# Patient Record
Sex: MALE | Race: WHITE | NOT HISPANIC OR LATINO | Employment: OTHER | URBAN - METROPOLITAN AREA
[De-identification: names, ages, dates, MRNs, and addresses within clinical notes are randomized per-mention and may not be internally consistent; named-entity substitution may affect disease eponyms.]

---

## 2018-01-18 NOTE — RESULT NOTES
Verified Results  (1) CBC/PLT/DIFF [id number] 07:38AM [place] Chaparronancy Coulter Order Number: [id number]     Order Number: [id number]     Test Name Result Flag Reference   WBC COUNT 5 82 Thousand/uL  4 31-10 16   RBC COUNT 4 25 Million/uL  3 88-5 62   HEMOGLOBIN 14 0 g/dL  12 0-17 0   HEMATOCRIT 42 3 %  36 5-49 3    fL H 82-98   MCH 32 9 pg  26 8-34 3   MCHC 33 1 g/dL  31 4-37 4   RDW 12 6 %  11 6-15 1   MPV 10 3 fL  8 9-12 7   PLATELET COUNT 202 Thousands/uL  149-390   NEUTROPHILS RELATIVE PERCENT 68 %  43-75   LYMPHOCYTES RELATIVE PERCENT 19 %  14-44   MONOCYTES RELATIVE PERCENT 11 %  4-12   EOSINOPHILS RELATIVE PERCENT 2 %  0-6   BASOPHILS RELATIVE PERCENT 0 %  0-1   NEUTROPHILS ABSOLUTE COUNT 4 00 Thousands/µL  1 85-7 62   LYMPHOCYTES ABSOLUTE COUNT 1 08 Thousands/µL  0 60-4 47   MONOCYTES ABSOLUTE COUNT 0 62 Thousand/µL  0 17-1 22   EOSINOPHILS ABSOLUTE COUNT 0 10 Thousand/µL  0 00-0 61   BASOPHILS ABSOLUTE COUNT 0 02 Thousands/µL  0 00-0 10     (1) LIPID PANEL, FASTING [id number] 07:38AM [place] Order Number: [id number]      Triglyceride:         Normal              <150 mg/dl       Borderline High    150-199 mg/dl       High               200-499 mg/dl       Very High          >499 mg/dl  Cholesterol:         Desirable        <200 mg/dl      Borderline High  200-239 mg/dl      High             >239 mg/dl  HDL Cholesterol:        High    >59 mg/dL      Low     <41 mg/dL     Test Name Result Flag Reference   CHOLESTEROL 191 mg/dL     HDL,DIRECT 70 mg/dL H 40-60   Specimen collection should occur prior to Metamizole administration due to the potential for falsely depressed results  LDL CHOLESTEROL CALCULATED 105 mg/dL H 0-100   TRIGLYCERIDES 78 mg/dL  <=150   Specimen collection should occur prior to N-Acetylcysteine or Metamizole administration due to the potential for falsely depressed results       (1) COMPREHENSIVE METABOLIC PANEL [id number] 47:54NV Chet Delarosa    Order Number: [id number]      National Kidney Disease Education Program recommendations are as follows:  GFR calculation is accurate only with a steady state creatinine  Chronic Kidney disease less than 60 ml/min/1 73 sq  meters  Kidney failure less than 15 ml/min/1 73 sq  meters  Test Name Result Flag Reference   GLUCOSE,RANDM 113 mg/dL     If the patient is fasting, the ADA then defines impaired fasting glucose as > 100 mg/dL and diabetes as > or equal to 123 mg/dL  SODIUM 142 mmol/L  136-145   POTASSIUM 4 3 mmol/L  3 5-5 3   CHLORIDE 106 mmol/L  100-108   CARBON DIOXIDE 26 mmol/L  21-32   ANION GAP (CALC) 10 mmol/L  4-13   BLOOD UREA NITROGEN 15 mg/dL  5-25   CREATININE 1 10 mg/dL  0 60-1 30   Standardized to IDMS reference method   CALCIUM 8 6 mg/dL  8 3-10 1   BILI, TOTAL 0 64 mg/dL  0 20-1 00   ALK PHOSPHATAS 61 U/L     ALT (SGPT) 41 U/L  12-78   AST(SGOT) 21 U/L  5-45   ALBUMIN 3 5 g/dL  3 5-5 0   TOTAL PROTEIN 6 9 g/dL  6 4-8 2   eGFR Non-African American      >60 0 ml/min/1 73sq m     (1) TSH WITH FT4 REFLEX [id number] 07:38AM Maya Delarosa Order Number: [id number]    Patients undergoing fluorescein dye angiography may retain small amounts of fluorescein in the body for 48-72 hours post procedure  Samples containing fluorescein can produce falsely depressed TSH values  If the patient had this procedure,a specimen should be resubmitted post fluorescein clearance  Test Name Result Flag Reference   TSH 0 794 uIU/mL  0 358-3 740     (1) PSA (SCREEN) (Dx V76 44 Screen for Prostate Cancer) [id number] 07:38AM Tj Cabrera Order Number: [id number]     Order Number: [id number]     Test Name Result Flag Reference   PROSTATE SPECIFIC ANTIGEN 0 9 ng/mL  0 0-4 0       Plan  PMH: Screening for prostate cancer    · (1) PSA (SCREEN) (Dx V76 44 Screen for Prostate Cancer) ; every 1 year; Last  [id number]; Next [id number]; Status:Active    Discussion/Summary    please call  labs are good  Pt is aware1       1 Amended By: Yessenia Steele; Apr 01 2016 8:48 AM EST    Signatures   Electronically signed by :  Yessenia Steele, ; Apr 1 2016  8:48AM EST                       (Author)

## 2018-02-13 ENCOUNTER — OFFICE VISIT (OUTPATIENT)
Dept: FAMILY MEDICINE CLINIC | Facility: HOSPITAL | Age: 73
End: 2018-02-13
Payer: MEDICARE

## 2018-02-13 VITALS
SYSTOLIC BLOOD PRESSURE: 140 MMHG | TEMPERATURE: 97 F | BODY MASS INDEX: 33.09 KG/M2 | HEIGHT: 69 IN | DIASTOLIC BLOOD PRESSURE: 80 MMHG | WEIGHT: 223.4 LBS | HEART RATE: 76 BPM

## 2018-02-13 DIAGNOSIS — E78.5 HYPERLIPIDEMIA, UNSPECIFIED HYPERLIPIDEMIA TYPE: ICD-10-CM

## 2018-02-13 DIAGNOSIS — R01.1 HEART MURMUR: Primary | ICD-10-CM

## 2018-02-13 DIAGNOSIS — J06.9 UPPER RESPIRATORY INFECTION, VIRAL: ICD-10-CM

## 2018-02-13 PROCEDURE — 99214 OFFICE O/P EST MOD 30 MIN: CPT | Performed by: NURSE PRACTITIONER

## 2018-02-13 RX ORDER — AMOXICILLIN 500 MG
CAPSULE ORAL
COMMUNITY
Start: 2014-11-19 | End: 2020-05-27

## 2018-02-13 RX ORDER — SIMVASTATIN 10 MG
1 TABLET ORAL DAILY
COMMUNITY
Start: 2014-11-24 | End: 2018-06-21 | Stop reason: SDUPTHER

## 2018-02-13 NOTE — PROGRESS NOTES
Assessment/Plan:    Likely viral URI  I do not feel it is r/t previous cough as that had resolved and was not r/t nasal congestion  Advise starting decongestant with cough suppressant  Call with any worsening or no improvement in 10-14 days  New heart murmur  Will order echo and screening blood work  Plans to schedule PE with Dr Blondell Dakin  Diagnoses and all orders for this visit:    Heart murmur  -     Echo complete with contrast if indicated; Future    Hyperlipidemia, unspecified hyperlipidemia type  -     CBC and differential; Future  -     Comprehensive metabolic panel; Future  -     Lipid panel; Future  -     TSH, 3rd generation with T4 reflex; Future    Upper respiratory infection, viral    Other orders  -     Omega-3 Fatty Acids (FISH OIL) 1200 MG CAPS; Take by mouth  -     Multiple Vitamin (MULTI-VITAMIN DAILY PO); Take by mouth  -     simvastatin (ZOCOR) 10 mg tablet; Take 1 tablet by mouth daily          Subjective:      Patient ID: Ashley Ny is a 71 y o  male  Cough started over 1 month ago  No fever, fatigue  No nasal congestion  Dry cough  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  Cough did go away for about 2 weeks  Last week started with cough again  Not as severe  Feels high in chest  Has nasal congestion  Denies fever, chills  Wife wanted him to come in  Denies sore throat and ear pain  Had some wheezing with last cough but not this time  Has taken amoxicillin his wife had left over  Leaving for vacation  Denies h/o heart murmur  Denies any sob  Has not had any blood work done in years  The following portions of the patient's history were reviewed and updated as appropriate: allergies, current medications, past medical history, past social history and problem list     Review of Systems   Constitutional: Negative for chills, fatigue and fever  HENT: Positive for congestion and ear pain  Negative for sore throat  Respiratory: Positive for cough   Negative for shortness of breath and wheezing  Cardiovascular: Negative for chest pain  Objective:    Vitals:    02/13/18 1117   BP: 140/80   Pulse: 76   Temp: (!) 97 °F (36 1 °C)        Physical Exam   Constitutional: He is oriented to person, place, and time  He appears well-developed and well-nourished  HENT:   Right Ear: Tympanic membrane, external ear and ear canal normal    Left Ear: Tympanic membrane, external ear and ear canal normal    Mouth/Throat: Uvula is midline and oropharynx is clear and moist    Cardiovascular: Normal rate, regular rhythm, S1 normal and S2 normal     Murmur heard  Systolic murmur is present with a grade of 2/6   Pulmonary/Chest: Effort normal and breath sounds normal    Lymphadenopathy:     He has no cervical adenopathy  Neurological: He is alert and oriented to person, place, and time  Skin: Skin is warm and dry  Psychiatric: He has a normal mood and affect

## 2018-02-14 ENCOUNTER — APPOINTMENT (OUTPATIENT)
Dept: LAB | Facility: HOSPITAL | Age: 73
End: 2018-02-14
Payer: MEDICARE

## 2018-02-14 DIAGNOSIS — E78.5 HYPERLIPIDEMIA, UNSPECIFIED HYPERLIPIDEMIA TYPE: ICD-10-CM

## 2018-02-14 LAB
ALBUMIN SERPL BCP-MCNC: 3.6 G/DL (ref 3.5–5)
ALP SERPL-CCNC: 58 U/L (ref 46–116)
ALT SERPL W P-5'-P-CCNC: 41 U/L (ref 12–78)
ANION GAP SERPL CALCULATED.3IONS-SCNC: 9 MMOL/L (ref 4–13)
AST SERPL W P-5'-P-CCNC: 23 U/L (ref 5–45)
BASOPHILS # BLD AUTO: 0.03 THOUSANDS/ΜL (ref 0–0.1)
BASOPHILS NFR BLD AUTO: 0 % (ref 0–1)
BILIRUB SERPL-MCNC: 0.6 MG/DL (ref 0.2–1)
BUN SERPL-MCNC: 18 MG/DL (ref 5–25)
CALCIUM SERPL-MCNC: 8.8 MG/DL (ref 8.3–10.1)
CHLORIDE SERPL-SCNC: 104 MMOL/L (ref 100–108)
CHOLEST SERPL-MCNC: 181 MG/DL (ref 50–200)
CO2 SERPL-SCNC: 29 MMOL/L (ref 21–32)
CREAT SERPL-MCNC: 1.02 MG/DL (ref 0.6–1.3)
EOSINOPHIL # BLD AUTO: 0.15 THOUSAND/ΜL (ref 0–0.61)
EOSINOPHIL NFR BLD AUTO: 2 % (ref 0–6)
ERYTHROCYTE [DISTWIDTH] IN BLOOD BY AUTOMATED COUNT: 12.3 % (ref 11.6–15.1)
GFR SERPL CREATININE-BSD FRML MDRD: 74 ML/MIN/1.73SQ M
GLUCOSE P FAST SERPL-MCNC: 117 MG/DL (ref 65–99)
HCT VFR BLD AUTO: 42.4 % (ref 36.5–49.3)
HDLC SERPL-MCNC: 51 MG/DL (ref 40–60)
HGB BLD-MCNC: 13.8 G/DL (ref 12–17)
LDLC SERPL CALC-MCNC: 112 MG/DL (ref 0–100)
LYMPHOCYTES # BLD AUTO: 1.66 THOUSANDS/ΜL (ref 0.6–4.47)
LYMPHOCYTES NFR BLD AUTO: 24 % (ref 14–44)
MCH RBC QN AUTO: 32.5 PG (ref 26.8–34.3)
MCHC RBC AUTO-ENTMCNC: 32.5 G/DL (ref 31.4–37.4)
MCV RBC AUTO: 100 FL (ref 82–98)
MONOCYTES # BLD AUTO: 0.58 THOUSAND/ΜL (ref 0.17–1.22)
MONOCYTES NFR BLD AUTO: 8 % (ref 4–12)
NEUTROPHILS # BLD AUTO: 4.62 THOUSANDS/ΜL (ref 1.85–7.62)
NEUTS SEG NFR BLD AUTO: 66 % (ref 43–75)
PLATELET # BLD AUTO: 271 THOUSANDS/UL (ref 149–390)
PMV BLD AUTO: 10.2 FL (ref 8.9–12.7)
POTASSIUM SERPL-SCNC: 4.1 MMOL/L (ref 3.5–5.3)
PROT SERPL-MCNC: 7.6 G/DL (ref 6.4–8.2)
RBC # BLD AUTO: 4.24 MILLION/UL (ref 3.88–5.62)
SODIUM SERPL-SCNC: 142 MMOL/L (ref 136–145)
TRIGL SERPL-MCNC: 88 MG/DL
TSH SERPL DL<=0.05 MIU/L-ACNC: 1.16 UIU/ML (ref 0.36–3.74)
WBC # BLD AUTO: 7.04 THOUSAND/UL (ref 4.31–10.16)

## 2018-02-14 PROCEDURE — 85025 COMPLETE CBC W/AUTO DIFF WBC: CPT

## 2018-02-14 PROCEDURE — 36415 COLL VENOUS BLD VENIPUNCTURE: CPT

## 2018-02-14 PROCEDURE — 84443 ASSAY THYROID STIM HORMONE: CPT

## 2018-02-14 PROCEDURE — 80061 LIPID PANEL: CPT

## 2018-02-14 PROCEDURE — 80053 COMPREHEN METABOLIC PANEL: CPT

## 2018-03-05 ENCOUNTER — OFFICE VISIT (OUTPATIENT)
Dept: FAMILY MEDICINE CLINIC | Facility: HOSPITAL | Age: 73
End: 2018-03-05
Payer: MEDICARE

## 2018-03-05 ENCOUNTER — HOSPITAL ENCOUNTER (OUTPATIENT)
Dept: NON INVASIVE DIAGNOSTICS | Facility: CLINIC | Age: 73
Discharge: HOME | End: 2018-03-05
Payer: MEDICARE

## 2018-03-05 VITALS
RESPIRATION RATE: 12 BRPM | TEMPERATURE: 96.8 F | WEIGHT: 221.4 LBS | DIASTOLIC BLOOD PRESSURE: 82 MMHG | SYSTOLIC BLOOD PRESSURE: 154 MMHG | HEART RATE: 84 BPM | BODY MASS INDEX: 32.79 KG/M2 | HEIGHT: 69 IN

## 2018-03-05 DIAGNOSIS — R01.1 HEART MURMUR: ICD-10-CM

## 2018-03-05 DIAGNOSIS — R01.1 MURMUR, CARDIAC: ICD-10-CM

## 2018-03-05 DIAGNOSIS — Z23 NEED FOR VACCINATION WITH 13-POLYVALENT PNEUMOCOCCAL CONJUGATE VACCINE: Primary | ICD-10-CM

## 2018-03-05 DIAGNOSIS — E78.5 HYPERLIPIDEMIA, UNSPECIFIED HYPERLIPIDEMIA TYPE: ICD-10-CM

## 2018-03-05 DIAGNOSIS — R73.03 PREDIABETES: ICD-10-CM

## 2018-03-05 PROCEDURE — 99214 OFFICE O/P EST MOD 30 MIN: CPT | Performed by: FAMILY MEDICINE

## 2018-03-05 PROCEDURE — 90670 PCV13 VACCINE IM: CPT | Performed by: FAMILY MEDICINE

## 2018-03-05 PROCEDURE — G0009 ADMIN PNEUMOCOCCAL VACCINE: HCPCS | Performed by: FAMILY MEDICINE

## 2018-03-05 PROCEDURE — 93306 TTE W/DOPPLER COMPLETE: CPT

## 2018-03-05 NOTE — PROGRESS NOTES
Patient is here for follow up of chronic conditions  1  Murmur  Murmur was found on the last visit  Not associated with shortness of breath  No chest pain  No lightheadedness  2  Hyperlipidemia  Doing well with his current regimen  3  Impaired fasting glucose  Last fasting blood work showed fasting glucose of 117  Review of Systems   Constitutional: Negative  Negative for activity change, appetite change, chills and diaphoresis  HENT: Negative for congestion and dental problem  Respiratory: Negative  Negative for apnea, chest tightness, shortness of breath and wheezing  Cardiovascular: Negative  Negative for chest pain, palpitations and leg swelling  Gastrointestinal: Negative  Negative for abdominal distention, abdominal pain, constipation, diarrhea and nausea  Genitourinary: Negative  Negative for difficulty urinating, dysuria and frequency  Social History     Social History    Marital status: /Civil Union     Spouse name: N/A    Number of children: N/A    Years of education: N/A     Occupational History    Not on file  Social History Main Topics    Smoking status: Never Smoker    Smokeless tobacco: Never Used    Alcohol use Yes      Comment: social    Drug use: No    Sexual activity: Not on file     Other Topics Concern    Not on file     Social History Narrative    No narrative on file               No Known Allergies    Immunization History   Administered Date(s) Administered    Influenza Split High Dose Preservative Free IM 10/22/2014    Influenza TIV (IM) 09/23/2017    Pneumococcal Conjugate 13-Valent 03/05/2018    Pneumococcal Polysaccharide PPV23 11/06/2012    Tdap 11/06/2008       Vitals:    03/05/18 1044   BP: 154/82   Pulse: 84   Resp: 12   Temp: (!) 96 8 °F (36 °C)     Physical Exam   Constitutional: He is oriented to person, place, and time  He appears well-developed and well-nourished     HENT:   Head: Normocephalic and atraumatic  Eyes: EOM are normal  Pupils are equal, round, and reactive to light  Neck: Normal range of motion  Neck supple  Cardiovascular: Normal rate and regular rhythm  Murmur heard  Systolic murmur is present with a grade of 3/6   Pulmonary/Chest: Effort normal and breath sounds normal    Abdominal: Soft  Bowel sounds are normal    Neurological: He is alert and oriented to person, place, and time  Skin: Skin is warm and dry  Psychiatric: He has a normal mood and affect  His behavior is normal    Nursing note and vitals reviewed                Recent Results (from the past 4368 hour(s))   CBC and differential    Collection Time: 02/14/18  6:33 AM   Result Value Ref Range    WBC 7 04 4 31 - 10 16 Thousand/uL    RBC 4 24 3 88 - 5 62 Million/uL    Hemoglobin 13 8 12 0 - 17 0 g/dL    Hematocrit 42 4 36 5 - 49 3 %     (H) 82 - 98 fL    MCH 32 5 26 8 - 34 3 pg    MCHC 32 5 31 4 - 37 4 g/dL    RDW 12 3 11 6 - 15 1 %    MPV 10 2 8 9 - 12 7 fL    Platelets 332 755 - 303 Thousands/uL    Neutrophils Relative 66 43 - 75 %    Lymphocytes Relative 24 14 - 44 %    Monocytes Relative 8 4 - 12 %    Eosinophils Relative 2 0 - 6 %    Basophils Relative 0 0 - 1 %    Neutrophils Absolute 4 62 1 85 - 7 62 Thousands/µL    Lymphocytes Absolute 1 66 0 60 - 4 47 Thousands/µL    Monocytes Absolute 0 58 0 17 - 1 22 Thousand/µL    Eosinophils Absolute 0 15 0 00 - 0 61 Thousand/µL    Basophils Absolute 0 03 0 00 - 0 10 Thousands/µL   Comprehensive metabolic panel    Collection Time: 02/14/18  6:33 AM   Result Value Ref Range    Sodium 142 136 - 145 mmol/L    Potassium 4 1 3 5 - 5 3 mmol/L    Chloride 104 100 - 108 mmol/L    CO2 29 21 - 32 mmol/L    Anion Gap 9 4 - 13 mmol/L    BUN 18 5 - 25 mg/dL    Creatinine 1 02 0 60 - 1 30 mg/dL    Glucose, Fasting 117 (H) 65 - 99 mg/dL    Calcium 8 8 8 3 - 10 1 mg/dL    AST 23 5 - 45 U/L    ALT 41 12 - 78 U/L    Alkaline Phosphatase 58 46 - 116 U/L    Total Protein 7 6 6 4 - 8 2 g/dL    Albumin 3 6 3 5 - 5 0 g/dL    Total Bilirubin 0 60 0 20 - 1 00 mg/dL    eGFR 74 ml/min/1 73sq m   Lipid panel    Collection Time: 02/14/18  6:33 AM   Result Value Ref Range    Cholesterol 181 50 - 200 mg/dL    Triglycerides 88 <=150 mg/dL    HDL, Direct 51 40 - 60 mg/dL    LDL Calculated 112 (H) 0 - 100 mg/dL   TSH, 3rd generation with T4 reflex    Collection Time: 02/14/18  6:33 AM   Result Value Ref Range    TSH 3RD GENERATON 1 162 0 358 - 3 740 uIU/mL     Problem List Items Addressed This Visit     Hyperlipidemia    Prediabetes    Murmur, cardiac      Other Visit Diagnoses     Need for vaccination with 13-polyvalent pneumococcal conjugate vaccine    -  Primary    Relevant Orders    PNEUMOCOCCAL CONJUGATE VACCINE 13-VALENT GREATER THAN 6 MONTHS (Completed)       await results of 2D echo  Patient currently asymptomatic despite the murmur  Discussed dietary control  Discussed continue regular exercise  Discussed weight loss  Reviewed blood work  Prevnar 13 given today  Will need   Pneumovax 23 next year  To call our office if any concerns/questions at [phone]  Health Maintenance Due   Topic Date Due    Hepatitis C Screening  1945    COLONOSCOPY  1945    Depression Screening PHQ-9  12/18/1958    DTaP,Tdap,and Td Vaccines (2 - Td) 12/04/2008    GLAUCOMA SCREENING 67+ YR  12/18/2013    INFLUENZA VACCINE  09/01/2017       No Follow-up on file

## 2018-03-06 PROCEDURE — 93306 TTE W/DOPPLER COMPLETE: CPT | Performed by: INTERNAL MEDICINE

## 2018-03-09 ENCOUNTER — TELEPHONE (OUTPATIENT)
Dept: FAMILY MEDICINE CLINIC | Facility: HOSPITAL | Age: 73
End: 2018-03-09

## 2018-05-30 DIAGNOSIS — Z29.8 ALTITUDE SICKNESS PROPHYLAXIS: Primary | ICD-10-CM

## 2018-05-30 RX ORDER — ACETAZOLAMIDE 250 MG/1
250 TABLET ORAL 2 TIMES DAILY
Qty: 20 TABLET | Refills: 0 | Status: SHIPPED | OUTPATIENT
Start: 2018-05-30 | End: 2019-04-05

## 2018-06-21 DIAGNOSIS — E78.5 HYPERLIPIDEMIA, UNSPECIFIED HYPERLIPIDEMIA TYPE: Primary | ICD-10-CM

## 2018-06-21 RX ORDER — SIMVASTATIN 10 MG
10 TABLET ORAL DAILY
Qty: 90 TABLET | Refills: 3 | Status: SHIPPED | OUTPATIENT
Start: 2018-06-21 | End: 2018-06-27 | Stop reason: SDUPTHER

## 2018-06-27 DIAGNOSIS — E78.5 HYPERLIPIDEMIA, UNSPECIFIED HYPERLIPIDEMIA TYPE: ICD-10-CM

## 2018-06-27 RX ORDER — SIMVASTATIN 10 MG
10 TABLET ORAL DAILY
Qty: 90 TABLET | Refills: 1 | Status: SHIPPED | OUTPATIENT
Start: 2018-06-27 | End: 2019-03-08 | Stop reason: SDUPTHER

## 2019-03-08 DIAGNOSIS — E78.5 HYPERLIPIDEMIA, UNSPECIFIED HYPERLIPIDEMIA TYPE: ICD-10-CM

## 2019-03-11 RX ORDER — SIMVASTATIN 10 MG
10 TABLET ORAL DAILY
Qty: 90 TABLET | Refills: 2 | Status: SHIPPED | OUTPATIENT
Start: 2019-03-11 | End: 2020-02-03 | Stop reason: SDUPTHER

## 2019-04-05 ENCOUNTER — APPOINTMENT (OUTPATIENT)
Dept: LAB | Facility: HOSPITAL | Age: 74
End: 2019-04-05
Payer: MEDICARE

## 2019-04-05 ENCOUNTER — OFFICE VISIT (OUTPATIENT)
Dept: FAMILY MEDICINE CLINIC | Facility: HOSPITAL | Age: 74
End: 2019-04-05
Payer: MEDICARE

## 2019-04-05 VITALS
SYSTOLIC BLOOD PRESSURE: 142 MMHG | DIASTOLIC BLOOD PRESSURE: 88 MMHG | BODY MASS INDEX: 34.01 KG/M2 | HEART RATE: 72 BPM | HEIGHT: 68 IN | WEIGHT: 224.4 LBS | TEMPERATURE: 97.2 F

## 2019-04-05 DIAGNOSIS — R73.03 PREDIABETES: ICD-10-CM

## 2019-04-05 DIAGNOSIS — E78.5 HYPERLIPIDEMIA, UNSPECIFIED HYPERLIPIDEMIA TYPE: ICD-10-CM

## 2019-04-05 DIAGNOSIS — Z00.00 MEDICARE ANNUAL WELLNESS VISIT, SUBSEQUENT: ICD-10-CM

## 2019-04-05 DIAGNOSIS — M62.08 RECTUS DIASTASIS: ICD-10-CM

## 2019-04-05 DIAGNOSIS — K42.9 UMBILICAL HERNIA WITHOUT OBSTRUCTION AND WITHOUT GANGRENE: ICD-10-CM

## 2019-04-05 DIAGNOSIS — Z23 ENCOUNTER FOR IMMUNIZATION: ICD-10-CM

## 2019-04-05 DIAGNOSIS — I35.0 MODERATE AORTIC STENOSIS: Primary | ICD-10-CM

## 2019-04-05 PROBLEM — E66.811 OBESITY (BMI 30.0-34.9): Status: ACTIVE | Noted: 2019-04-05

## 2019-04-05 PROBLEM — E66.9 OBESITY (BMI 30.0-34.9): Status: ACTIVE | Noted: 2019-04-05

## 2019-04-05 LAB
ALBUMIN SERPL BCP-MCNC: 3.7 G/DL (ref 3.5–5)
ALP SERPL-CCNC: 59 U/L (ref 46–116)
ALT SERPL W P-5'-P-CCNC: 50 U/L (ref 12–78)
ANION GAP SERPL CALCULATED.3IONS-SCNC: 9 MMOL/L (ref 4–13)
AST SERPL W P-5'-P-CCNC: 27 U/L (ref 5–45)
BILIRUB SERPL-MCNC: 0.5 MG/DL (ref 0.2–1)
BUN SERPL-MCNC: 22 MG/DL (ref 5–25)
CALCIUM SERPL-MCNC: 9.1 MG/DL (ref 8.3–10.1)
CHLORIDE SERPL-SCNC: 105 MMOL/L (ref 100–108)
CHOLEST SERPL-MCNC: 189 MG/DL (ref 50–200)
CO2 SERPL-SCNC: 27 MMOL/L (ref 21–32)
CREAT SERPL-MCNC: 1.01 MG/DL (ref 0.6–1.3)
ERYTHROCYTE [DISTWIDTH] IN BLOOD BY AUTOMATED COUNT: 12 % (ref 11.6–15.1)
EST. AVERAGE GLUCOSE BLD GHB EST-MCNC: 108 MG/DL
GFR SERPL CREATININE-BSD FRML MDRD: 74 ML/MIN/1.73SQ M
GLUCOSE P FAST SERPL-MCNC: 112 MG/DL (ref 65–99)
HBA1C MFR BLD: 5.4 % (ref 4.2–6.3)
HCT VFR BLD AUTO: 43 % (ref 36.5–49.3)
HDLC SERPL-MCNC: 61 MG/DL (ref 40–60)
HGB BLD-MCNC: 13.9 G/DL (ref 12–17)
LDLC SERPL CALC-MCNC: 109 MG/DL (ref 0–100)
MAGNESIUM SERPL-MCNC: 1.9 MG/DL (ref 1.6–2.6)
MCH RBC QN AUTO: 33.6 PG (ref 26.8–34.3)
MCHC RBC AUTO-ENTMCNC: 32.3 G/DL (ref 31.4–37.4)
MCV RBC AUTO: 104 FL (ref 82–98)
PLATELET # BLD AUTO: 270 THOUSANDS/UL (ref 149–390)
PMV BLD AUTO: 10.7 FL (ref 8.9–12.7)
POTASSIUM SERPL-SCNC: 4.2 MMOL/L (ref 3.5–5.3)
PROT SERPL-MCNC: 7.3 G/DL (ref 6.4–8.2)
RBC # BLD AUTO: 4.14 MILLION/UL (ref 3.88–5.62)
SODIUM SERPL-SCNC: 141 MMOL/L (ref 136–145)
TRIGL SERPL-MCNC: 93 MG/DL
TSH SERPL DL<=0.05 MIU/L-ACNC: 0.89 UIU/ML (ref 0.36–3.74)
WBC # BLD AUTO: 7.57 THOUSAND/UL (ref 4.31–10.16)

## 2019-04-05 PROCEDURE — G0439 PPPS, SUBSEQ VISIT: HCPCS | Performed by: FAMILY MEDICINE

## 2019-04-05 PROCEDURE — 80053 COMPREHEN METABOLIC PANEL: CPT

## 2019-04-05 PROCEDURE — 80061 LIPID PANEL: CPT

## 2019-04-05 PROCEDURE — 99214 OFFICE O/P EST MOD 30 MIN: CPT | Performed by: FAMILY MEDICINE

## 2019-04-05 PROCEDURE — 85027 COMPLETE CBC AUTOMATED: CPT

## 2019-04-05 PROCEDURE — 84443 ASSAY THYROID STIM HORMONE: CPT

## 2019-04-05 PROCEDURE — 83036 HEMOGLOBIN GLYCOSYLATED A1C: CPT

## 2019-04-05 PROCEDURE — G0009 ADMIN PNEUMOCOCCAL VACCINE: HCPCS | Performed by: FAMILY MEDICINE

## 2019-04-05 PROCEDURE — 83735 ASSAY OF MAGNESIUM: CPT

## 2019-04-05 PROCEDURE — 36415 COLL VENOUS BLD VENIPUNCTURE: CPT

## 2019-04-05 PROCEDURE — 90732 PPSV23 VACC 2 YRS+ SUBQ/IM: CPT | Performed by: FAMILY MEDICINE

## 2019-04-09 ENCOUNTER — HOSPITAL ENCOUNTER (OUTPATIENT)
Dept: NON INVASIVE DIAGNOSTICS | Facility: CLINIC | Age: 74
Discharge: HOME | End: 2019-04-09
Payer: MEDICARE

## 2019-04-09 DIAGNOSIS — I35.0 MODERATE AORTIC STENOSIS: ICD-10-CM

## 2019-04-09 PROCEDURE — 93306 TTE W/DOPPLER COMPLETE: CPT

## 2019-04-10 ENCOUNTER — TELEPHONE (OUTPATIENT)
Dept: FAMILY MEDICINE CLINIC | Facility: HOSPITAL | Age: 74
End: 2019-04-10

## 2019-04-10 PROCEDURE — 93306 TTE W/DOPPLER COMPLETE: CPT | Performed by: INTERNAL MEDICINE

## 2019-06-10 ENCOUNTER — TRANSCRIBE ORDERS (OUTPATIENT)
Dept: ADMINISTRATIVE | Facility: HOSPITAL | Age: 74
End: 2019-06-10

## 2019-06-10 DIAGNOSIS — M25.261 FLAIL JOINT OF RIGHT KNEE: Primary | ICD-10-CM

## 2019-06-20 ENCOUNTER — HOSPITAL ENCOUNTER (OUTPATIENT)
Dept: MRI IMAGING | Facility: HOSPITAL | Age: 74
Discharge: HOME | End: 2019-06-20
Payer: MEDICARE

## 2019-06-20 DIAGNOSIS — M25.261 FLAIL JOINT OF RIGHT KNEE: ICD-10-CM

## 2019-06-20 PROCEDURE — 73721 MRI JNT OF LWR EXTRE W/O DYE: CPT

## 2019-06-25 ENCOUNTER — HOSPITAL ENCOUNTER (OUTPATIENT)
Dept: NON INVASIVE DIAGNOSTICS | Facility: HOSPITAL | Age: 74
Discharge: HOME | End: 2019-06-25
Payer: MEDICARE

## 2019-06-25 ENCOUNTER — TRANSCRIBE ORDERS (OUTPATIENT)
Dept: ADMINISTRATIVE | Facility: HOSPITAL | Age: 74
End: 2019-06-25

## 2019-06-25 DIAGNOSIS — Z01.810 PRE-OPERATIVE CARDIOVASCULAR EXAMINATION: ICD-10-CM

## 2019-06-25 DIAGNOSIS — Z01.810 PRE-OPERATIVE CARDIOVASCULAR EXAMINATION: Primary | ICD-10-CM

## 2019-06-25 LAB
ATRIAL RATE: 65 BPM
P AXIS: 0 DEGREES
PR INTERVAL: 162 MS
QRS AXIS: 18 DEGREES
QRSD INTERVAL: 86 MS
QT INTERVAL: 384 MS
QTC INTERVAL: 399 MS
T WAVE AXIS: -3 DEGREES
VENTRICULAR RATE: 65 BPM

## 2019-06-25 PROCEDURE — 93005 ELECTROCARDIOGRAM TRACING: CPT

## 2019-06-25 PROCEDURE — 93010 ELECTROCARDIOGRAM REPORT: CPT | Performed by: INTERNAL MEDICINE

## 2019-10-09 ENCOUNTER — OFFICE VISIT (OUTPATIENT)
Dept: FAMILY MEDICINE CLINIC | Facility: HOSPITAL | Age: 74
End: 2019-10-09
Payer: MEDICARE

## 2019-10-09 VITALS
SYSTOLIC BLOOD PRESSURE: 138 MMHG | DIASTOLIC BLOOD PRESSURE: 80 MMHG | TEMPERATURE: 96.9 F | BODY MASS INDEX: 31.95 KG/M2 | HEART RATE: 62 BPM | WEIGHT: 210.8 LBS | HEIGHT: 68 IN

## 2019-10-09 DIAGNOSIS — E78.5 HYPERLIPIDEMIA, UNSPECIFIED HYPERLIPIDEMIA TYPE: ICD-10-CM

## 2019-10-09 DIAGNOSIS — Z12.5 SCREENING PSA (PROSTATE SPECIFIC ANTIGEN): ICD-10-CM

## 2019-10-09 DIAGNOSIS — R73.03 PREDIABETES: ICD-10-CM

## 2019-10-09 DIAGNOSIS — I35.0 MODERATE AORTIC STENOSIS: Primary | ICD-10-CM

## 2019-10-09 PROCEDURE — 99214 OFFICE O/P EST MOD 30 MIN: CPT | Performed by: FAMILY MEDICINE

## 2019-10-09 NOTE — PROGRESS NOTES
Assessment/Plan: Moderate aortic stenosis  Stable  asymptomatic  Recheck echo in April 2020/   Monitor for sytmposm  Prediabetes  Continue with dietary control and exercise  Obesity (BMI 30 0-34  9)  BMI Counseling: Body mass index is 32 53 kg/m²  The BMI is above normal  Nutrition recommendations include reducing portion sizes, 3-5 servings of fruits/vegetables daily, reducing fast food intake and consuming healthier snacks  Exercise recommendations include moderate aerobic physical activity for 150 minutes/week  Hyperlipidemia  Continue with simvastatin  Recheck labs prior to next visit  Diagnoses and all orders for this visit:    Moderate aortic stenosis  -     Echo complete with contrast if indicated; Future    Hyperlipidemia, unspecified hyperlipidemia type  -     CBC; Future  -     Comprehensive metabolic panel; Future  -     Lipid Panel with Direct LDL reflex; Future  -     TSH, 3rd generation with Free T4 reflex; Future    Prediabetes  -     Hemoglobin A1C; Future    Screening PSA (prostate specific antigen)  -     PSA, Total Screen; Future            Subjective:   Chief Complaint   Patient presents with    Follow-up     6 month         Patient ID: Beckie Jacobs is a 68 y o  male  Here for f/u of aortic stenosis, hyperlipidemia, and prediabetes  Doing well  No sob, no lightheadedness, no chest pain  Doing well with medications  No new concerns  The following portions of the patient's history were reviewed and updated as appropriate: allergies, current medications, past family history, past medical history, past social history, past surgical history and problem list     Review of Systems   Constitutional: Negative  Negative for activity change, appetite change, chills and diaphoresis  HENT: Negative for congestion and dental problem  Respiratory: Negative  Negative for apnea, chest tightness, shortness of breath and wheezing      Cardiovascular: Negative  Negative for chest pain, palpitations and leg swelling  Gastrointestinal: Negative  Negative for abdominal distention, abdominal pain, constipation, diarrhea and nausea  Genitourinary: Negative  Negative for difficulty urinating, dysuria and frequency  Objective:  Vitals:    10/09/19 0737   BP: 138/80   Pulse: 62   Temp: (!) 96 9 °F (36 1 °C)   Weight: 95 6 kg (210 lb 12 8 oz)   Height: 5' 7 5" (1 715 m)      Physical Exam   Constitutional: He is oriented to person, place, and time  He appears well-developed and well-nourished  No distress  HENT:   Head: Normocephalic and atraumatic  Right Ear: External ear normal    Left Ear: External ear normal    Nose: Nose normal    Mouth/Throat: Oropharynx is clear and moist    Eyes: Pupils are equal, round, and reactive to light  Conjunctivae and EOM are normal    Neck: Normal range of motion  Neck supple  Cardiovascular: Normal rate and regular rhythm  Exam reveals no gallop and no friction rub  Murmur heard  Pulmonary/Chest: Effort normal and breath sounds normal  No respiratory distress  He has no wheezes  He has no rales  He exhibits no tenderness  Abdominal: Soft  Bowel sounds are normal  He exhibits no distension and no mass  There is no tenderness  There is no rebound and no guarding  Genitourinary: Penis normal    Musculoskeletal: Normal range of motion  Neurological: He is alert and oriented to person, place, and time  Skin: Skin is warm  Psychiatric: He has a normal mood and affect  His behavior is normal  Judgment and thought content normal    Nursing note and vitals reviewed

## 2020-02-03 DIAGNOSIS — E78.5 HYPERLIPIDEMIA, UNSPECIFIED HYPERLIPIDEMIA TYPE: ICD-10-CM

## 2020-02-03 RX ORDER — SIMVASTATIN 10 MG
10 TABLET ORAL DAILY
Qty: 90 TABLET | Refills: 0 | Status: SHIPPED | OUTPATIENT
Start: 2020-02-03 | End: 2020-05-18

## 2020-05-08 ENCOUNTER — TELEPHONE (OUTPATIENT)
Dept: FAMILY MEDICINE CLINIC | Facility: HOSPITAL | Age: 75
End: 2020-05-08

## 2020-05-08 DIAGNOSIS — I35.0 MODERATE AORTIC STENOSIS: Primary | ICD-10-CM

## 2020-05-18 DIAGNOSIS — I35.0 MODERATE AORTIC STENOSIS: Primary | ICD-10-CM

## 2020-05-18 DIAGNOSIS — E78.5 HYPERLIPIDEMIA, UNSPECIFIED HYPERLIPIDEMIA TYPE: ICD-10-CM

## 2020-05-18 RX ORDER — SIMVASTATIN 10 MG
TABLET ORAL
Qty: 90 TABLET | Refills: 0 | Status: SHIPPED | OUTPATIENT
Start: 2020-05-18 | End: 2020-09-01

## 2020-05-22 ENCOUNTER — APPOINTMENT (OUTPATIENT)
Dept: LAB | Facility: HOSPITAL | Age: 75
End: 2020-05-22
Payer: MEDICARE

## 2020-05-22 ENCOUNTER — HOSPITAL ENCOUNTER (OUTPATIENT)
Dept: NON INVASIVE DIAGNOSTICS | Age: 75
Discharge: HOME | End: 2020-05-22
Payer: MEDICARE

## 2020-05-22 DIAGNOSIS — Z12.5 SCREENING PSA (PROSTATE SPECIFIC ANTIGEN): ICD-10-CM

## 2020-05-22 DIAGNOSIS — I35.0 MODERATE AORTIC STENOSIS: ICD-10-CM

## 2020-05-22 DIAGNOSIS — E78.5 HYPERLIPIDEMIA, UNSPECIFIED HYPERLIPIDEMIA TYPE: ICD-10-CM

## 2020-05-22 DIAGNOSIS — R73.03 PREDIABETES: ICD-10-CM

## 2020-05-22 LAB
ALBUMIN SERPL BCP-MCNC: 3.6 G/DL (ref 3.5–5)
ALP SERPL-CCNC: 55 U/L (ref 46–116)
ALT SERPL W P-5'-P-CCNC: 32 U/L (ref 12–78)
ANION GAP SERPL CALCULATED.3IONS-SCNC: 5 MMOL/L (ref 4–13)
AST SERPL W P-5'-P-CCNC: 18 U/L (ref 5–45)
BILIRUB SERPL-MCNC: 0.56 MG/DL (ref 0.2–1)
BUN SERPL-MCNC: 22 MG/DL (ref 5–25)
CALCIUM SERPL-MCNC: 8.9 MG/DL (ref 8.3–10.1)
CHLORIDE SERPL-SCNC: 108 MMOL/L (ref 100–108)
CHOLEST SERPL-MCNC: 160 MG/DL (ref 50–200)
CO2 SERPL-SCNC: 26 MMOL/L (ref 21–32)
CREAT SERPL-MCNC: 0.96 MG/DL (ref 0.6–1.3)
ERYTHROCYTE [DISTWIDTH] IN BLOOD BY AUTOMATED COUNT: 11.7 % (ref 11.6–15.1)
EST. AVERAGE GLUCOSE BLD GHB EST-MCNC: 108 MG/DL
GFR SERPL CREATININE-BSD FRML MDRD: 78 ML/MIN/1.73SQ M
GLUCOSE P FAST SERPL-MCNC: 106 MG/DL (ref 65–99)
HBA1C MFR BLD: 5.4 %
HCT VFR BLD AUTO: 42.6 % (ref 36.5–49.3)
HDLC SERPL-MCNC: 55 MG/DL
HGB BLD-MCNC: 13.7 G/DL (ref 12–17)
LDLC SERPL CALC-MCNC: 93 MG/DL (ref 0–100)
MCH RBC QN AUTO: 32.8 PG (ref 26.8–34.3)
MCHC RBC AUTO-ENTMCNC: 32.2 G/DL (ref 31.4–37.4)
MCV RBC AUTO: 102 FL (ref 82–98)
PLATELET # BLD AUTO: 238 THOUSANDS/UL (ref 149–390)
PMV BLD AUTO: 10.6 FL (ref 8.9–12.7)
POTASSIUM SERPL-SCNC: 4.3 MMOL/L (ref 3.5–5.3)
PROT SERPL-MCNC: 6.9 G/DL (ref 6.4–8.2)
PSA SERPL-MCNC: 1.6 NG/ML (ref 0–4)
RBC # BLD AUTO: 4.18 MILLION/UL (ref 3.88–5.62)
SODIUM SERPL-SCNC: 139 MMOL/L (ref 136–145)
TRIGL SERPL-MCNC: 61 MG/DL
TSH SERPL DL<=0.05 MIU/L-ACNC: 0.74 UIU/ML (ref 0.36–3.74)
WBC # BLD AUTO: 5.8 THOUSAND/UL (ref 4.31–10.16)

## 2020-05-22 PROCEDURE — 83036 HEMOGLOBIN GLYCOSYLATED A1C: CPT

## 2020-05-22 PROCEDURE — 80061 LIPID PANEL: CPT

## 2020-05-22 PROCEDURE — 85027 COMPLETE CBC AUTOMATED: CPT

## 2020-05-22 PROCEDURE — 84443 ASSAY THYROID STIM HORMONE: CPT

## 2020-05-22 PROCEDURE — 93306 TTE W/DOPPLER COMPLETE: CPT | Performed by: INTERNAL MEDICINE

## 2020-05-22 PROCEDURE — 36415 COLL VENOUS BLD VENIPUNCTURE: CPT

## 2020-05-22 PROCEDURE — G0103 PSA SCREENING: HCPCS

## 2020-05-22 PROCEDURE — 80053 COMPREHEN METABOLIC PANEL: CPT

## 2020-05-22 PROCEDURE — 93306 TTE W/DOPPLER COMPLETE: CPT

## 2020-05-27 ENCOUNTER — OFFICE VISIT (OUTPATIENT)
Dept: FAMILY MEDICINE CLINIC | Facility: HOSPITAL | Age: 75
End: 2020-05-27
Payer: MEDICARE

## 2020-05-27 VITALS
HEIGHT: 67 IN | WEIGHT: 197.4 LBS | SYSTOLIC BLOOD PRESSURE: 138 MMHG | HEART RATE: 59 BPM | TEMPERATURE: 97.3 F | BODY MASS INDEX: 30.98 KG/M2 | DIASTOLIC BLOOD PRESSURE: 78 MMHG

## 2020-05-27 DIAGNOSIS — Z00.00 MEDICARE ANNUAL WELLNESS VISIT, SUBSEQUENT: ICD-10-CM

## 2020-05-27 DIAGNOSIS — Z11.59 NEED FOR HEPATITIS C SCREENING TEST: ICD-10-CM

## 2020-05-27 DIAGNOSIS — I35.0 MODERATE AORTIC STENOSIS: Primary | ICD-10-CM

## 2020-05-27 DIAGNOSIS — R73.03 PREDIABETES: ICD-10-CM

## 2020-05-27 DIAGNOSIS — E78.5 HYPERLIPIDEMIA, UNSPECIFIED HYPERLIPIDEMIA TYPE: ICD-10-CM

## 2020-05-27 DIAGNOSIS — E66.9 OBESITY (BMI 30.0-34.9): ICD-10-CM

## 2020-05-27 PROCEDURE — 1125F AMNT PAIN NOTED PAIN PRSNT: CPT | Performed by: FAMILY MEDICINE

## 2020-05-27 PROCEDURE — 1036F TOBACCO NON-USER: CPT | Performed by: FAMILY MEDICINE

## 2020-05-27 PROCEDURE — 4040F PNEUMOC VAC/ADMIN/RCVD: CPT | Performed by: FAMILY MEDICINE

## 2020-05-27 PROCEDURE — 99214 OFFICE O/P EST MOD 30 MIN: CPT | Performed by: FAMILY MEDICINE

## 2020-05-27 PROCEDURE — G0438 PPPS, INITIAL VISIT: HCPCS | Performed by: FAMILY MEDICINE

## 2020-05-27 PROCEDURE — 3008F BODY MASS INDEX DOCD: CPT | Performed by: FAMILY MEDICINE

## 2020-05-27 PROCEDURE — 1160F RVW MEDS BY RX/DR IN RCRD: CPT | Performed by: FAMILY MEDICINE

## 2020-05-27 PROCEDURE — 1170F FXNL STATUS ASSESSED: CPT | Performed by: FAMILY MEDICINE

## 2020-09-01 DIAGNOSIS — E78.5 HYPERLIPIDEMIA, UNSPECIFIED HYPERLIPIDEMIA TYPE: ICD-10-CM

## 2020-09-01 RX ORDER — SIMVASTATIN 10 MG
TABLET ORAL
Qty: 90 TABLET | Refills: 1 | Status: SHIPPED | OUTPATIENT
Start: 2020-09-01 | End: 2021-03-16

## 2020-10-14 ENCOUNTER — IMMUNIZATIONS (OUTPATIENT)
Dept: FAMILY MEDICINE CLINIC | Facility: HOSPITAL | Age: 75
End: 2020-10-14
Payer: MEDICARE

## 2020-10-14 ENCOUNTER — TELEPHONE (OUTPATIENT)
Dept: FAMILY MEDICINE CLINIC | Facility: HOSPITAL | Age: 75
End: 2020-10-14

## 2020-10-14 DIAGNOSIS — I35.0 MODERATE AORTIC STENOSIS: Primary | ICD-10-CM

## 2020-10-14 DIAGNOSIS — Z23 ENCOUNTER FOR IMMUNIZATION: ICD-10-CM

## 2020-10-14 PROCEDURE — G0008 ADMIN INFLUENZA VIRUS VAC: HCPCS | Performed by: FAMILY MEDICINE

## 2020-10-14 PROCEDURE — 90662 IIV NO PRSV INCREASED AG IM: CPT | Performed by: FAMILY MEDICINE

## 2020-11-07 ENCOUNTER — OFFICE VISIT (OUTPATIENT)
Dept: URGENT CARE | Facility: CLINIC | Age: 75
End: 2020-11-07
Payer: MEDICARE

## 2020-11-07 ENCOUNTER — HOSPITAL ENCOUNTER (EMERGENCY)
Facility: HOSPITAL | Age: 75
Discharge: HOME | End: 2020-11-07
Attending: EMERGENCY MEDICINE | Admitting: EMERGENCY MEDICINE
Payer: MEDICARE

## 2020-11-07 VITALS
TEMPERATURE: 97.2 F | BODY MASS INDEX: 29.38 KG/M2 | WEIGHT: 198.4 LBS | SYSTOLIC BLOOD PRESSURE: 118 MMHG | HEIGHT: 69 IN | DIASTOLIC BLOOD PRESSURE: 71 MMHG | HEART RATE: 70 BPM | OXYGEN SATURATION: 96 % | RESPIRATION RATE: 16 BRPM

## 2020-11-07 VITALS
HEART RATE: 63 BPM | BODY MASS INDEX: 29.33 KG/M2 | SYSTOLIC BLOOD PRESSURE: 192 MMHG | RESPIRATION RATE: 16 BRPM | TEMPERATURE: 96.9 F | OXYGEN SATURATION: 99 % | HEIGHT: 69 IN | DIASTOLIC BLOOD PRESSURE: 86 MMHG | WEIGHT: 198 LBS

## 2020-11-07 DIAGNOSIS — S61.419A HAND LACERATION: Primary | ICD-10-CM

## 2020-11-07 DIAGNOSIS — S61.411A LACERATION OF RIGHT PALM, INITIAL ENCOUNTER: Primary | ICD-10-CM

## 2020-11-07 PROCEDURE — 99283 EMERGENCY DEPT VISIT LOW MDM: CPT

## 2020-11-07 PROCEDURE — 99213 OFFICE O/P EST LOW 20 MIN: CPT | Performed by: FAMILY MEDICINE

## 2020-11-07 PROCEDURE — G0463 HOSPITAL OUTPT CLINIC VISIT: HCPCS | Performed by: FAMILY MEDICINE

## 2020-11-07 PROCEDURE — 99282 EMERGENCY DEPT VISIT SF MDM: CPT | Performed by: EMERGENCY MEDICINE

## 2020-11-07 PROCEDURE — 12002 RPR S/N/AX/GEN/TRNK2.6-7.5CM: CPT | Performed by: EMERGENCY MEDICINE

## 2020-11-07 RX ORDER — GINSENG 100 MG
1 CAPSULE ORAL ONCE
Status: COMPLETED | OUTPATIENT
Start: 2020-11-07 | End: 2020-11-07

## 2020-11-07 RX ORDER — LIDOCAINE HYDROCHLORIDE AND EPINEPHRINE 10; 10 MG/ML; UG/ML
20 INJECTION, SOLUTION INFILTRATION; PERINEURAL ONCE
Status: COMPLETED | OUTPATIENT
Start: 2020-11-07 | End: 2020-11-07

## 2020-11-07 RX ADMIN — LIDOCAINE HYDROCHLORIDE AND EPINEPHRINE 20 ML: 10; 10 INJECTION, SOLUTION INFILTRATION; PERINEURAL at 18:00

## 2020-11-07 RX ADMIN — BACITRACIN 1 SMALL APPLICATION: 500 OINTMENT TOPICAL at 18:00

## 2020-11-10 ENCOUNTER — TELEPHONE (OUTPATIENT)
Dept: ADMINISTRATIVE | Facility: OTHER | Age: 75
End: 2020-11-10

## 2020-11-16 ENCOUNTER — OFFICE VISIT (OUTPATIENT)
Dept: FAMILY MEDICINE CLINIC | Facility: HOSPITAL | Age: 75
End: 2020-11-16
Payer: MEDICARE

## 2020-11-16 VITALS
HEIGHT: 69 IN | WEIGHT: 199 LBS | TEMPERATURE: 96.3 F | SYSTOLIC BLOOD PRESSURE: 136 MMHG | BODY MASS INDEX: 29.47 KG/M2 | DIASTOLIC BLOOD PRESSURE: 80 MMHG | HEART RATE: 61 BPM

## 2020-11-16 DIAGNOSIS — S61.411D LACERATION OF RIGHT PALM, SUBSEQUENT ENCOUNTER: Primary | ICD-10-CM

## 2020-11-16 DIAGNOSIS — Z48.02 ENCOUNTER FOR REMOVAL OF SUTURES: ICD-10-CM

## 2020-11-16 PROCEDURE — 99213 OFFICE O/P EST LOW 20 MIN: CPT | Performed by: FAMILY MEDICINE

## 2020-12-01 ENCOUNTER — HOSPITAL ENCOUNTER (OUTPATIENT)
Dept: NON INVASIVE DIAGNOSTICS | Age: 75
Discharge: HOME | End: 2020-12-01
Payer: MEDICARE

## 2020-12-01 DIAGNOSIS — I35.0 MODERATE AORTIC STENOSIS: ICD-10-CM

## 2020-12-01 PROCEDURE — 93306 TTE W/DOPPLER COMPLETE: CPT | Performed by: INTERNAL MEDICINE

## 2020-12-01 PROCEDURE — 93306 TTE W/DOPPLER COMPLETE: CPT

## 2020-12-07 ENCOUNTER — OFFICE VISIT (OUTPATIENT)
Dept: FAMILY MEDICINE CLINIC | Facility: HOSPITAL | Age: 75
End: 2020-12-07
Payer: MEDICARE

## 2020-12-07 VITALS
SYSTOLIC BLOOD PRESSURE: 112 MMHG | TEMPERATURE: 96.1 F | WEIGHT: 194.6 LBS | HEIGHT: 69 IN | BODY MASS INDEX: 28.82 KG/M2 | HEART RATE: 59 BPM | DIASTOLIC BLOOD PRESSURE: 78 MMHG

## 2020-12-07 DIAGNOSIS — I35.0 MODERATE AORTIC STENOSIS: Primary | ICD-10-CM

## 2020-12-07 PROCEDURE — 99213 OFFICE O/P EST LOW 20 MIN: CPT | Performed by: FAMILY MEDICINE

## 2020-12-07 RX ORDER — CHLORAL HYDRATE 500 MG
1000 CAPSULE ORAL DAILY
COMMUNITY
End: 2022-02-14 | Stop reason: HOSPADM

## 2021-01-08 ENCOUNTER — CONSULT (OUTPATIENT)
Dept: CARDIOLOGY CLINIC | Facility: CLINIC | Age: 76
End: 2021-01-08
Payer: MEDICARE

## 2021-01-08 VITALS
BODY MASS INDEX: 29.33 KG/M2 | WEIGHT: 198 LBS | SYSTOLIC BLOOD PRESSURE: 160 MMHG | HEIGHT: 69 IN | HEART RATE: 62 BPM | DIASTOLIC BLOOD PRESSURE: 90 MMHG

## 2021-01-08 DIAGNOSIS — I35.0 MODERATE AORTIC STENOSIS: ICD-10-CM

## 2021-01-08 PROCEDURE — 99203 OFFICE O/P NEW LOW 30 MIN: CPT | Performed by: INTERNAL MEDICINE

## 2021-01-08 PROCEDURE — 1123F ACP DISCUSS/DSCN MKR DOCD: CPT | Performed by: INTERNAL MEDICINE

## 2021-01-08 PROCEDURE — 93000 ELECTROCARDIOGRAM COMPLETE: CPT | Performed by: INTERNAL MEDICINE

## 2021-01-08 NOTE — PROGRESS NOTES
Consultation - Cardiology   Teetee Carpenter 77 y o  male MRN: [id number]    Encounter: [id number]    Assessment/Plan     Assessment:     Moderate Aortic Stenosis      Plan:    His echocardiogram was reviewed  He has moderate aortic stenosis  Will continue to monitor his AS with surveillance echocardiograms  He is otherwise asymptomatic  We reviewed criteria for severe aortic stenosis and options of surgical and transcatheter AVR once he would meet that criteria  His BP is high today however typically has been normal        History of Present Illness   Physician Requesting Consult: No att  providers found  Reason for Consult / Principal Problem: Aortic Stenosis  HPI: Teetee Carpenter is a 77y o  year old male who presents with aortic stenosis that was found on his echocardiogram  He has a history of dyslipidemia  He is active and he denies any chest pain, no dyspnea and no palpitations  He has no history of CAD, CHF or arrhythmia  Today he feels well and is asymptomatic  Family Hx: Father lived to 80 and had an aneurysm  Soc Hx: nonsmoker    Review of Systems   Constitution: Negative  HENT: Negative  Eyes: Negative  Cardiovascular: Negative  Respiratory: Negative  Endocrine: Negative  Hematologic/Lymphatic: Negative  Skin: Negative  Musculoskeletal: Negative  Gastrointestinal: Negative  Genitourinary: Negative  Neurological: Negative  Psychiatric/Behavioral: Negative  Allergic/Immunologic: Negative          Historical Information   Past Medical History:   Diagnosis Date    Cataract     Hyperlipidemia     Umbilical hernia      Past Surgical History:   Procedure Laterality Date    SHOULDER SURGERY  2012    TONSILLECTOMY      childhood       Social History:  Social History     Substance and Sexual Activity   Alcohol Use Yes    Comment: social     Social History     Substance and Sexual Activity   Drug Use No     Social History     Tobacco Use   Smoking Status Never Smoker   Smokeless Tobacco Never Used       Family History:   Family History   Problem Relation Age of Onset    Colon cancer Mother     Other Father         heart problem    Parkinsonism Family        Meds/Allergies   No Known Allergies    Current Outpatient Medications:     Multiple Vitamin (MULTI-VITAMIN DAILY PO), Take by mouth, Disp: , Rfl:     simvastatin (ZOCOR) 10 mg tablet, Take 1 tablet by mouth once daily, Disp: 90 tablet, Rfl: 1    Omega-3 Fatty Acids (fish oil) 1,000 mg, Take 1,000 mg by mouth daily, Disp: , Rfl:     Vitals:   Pulse: 62  Blood Pressue: 160/90  Weight: 89 8 kg (198 lb)      Physical Exam   Constitutional: He is oriented to person, place, and time  He appears well-developed and well-nourished  HENT:   Head: Normocephalic  Mouth/Throat: No oropharyngeal exudate  Eyes: Conjunctivae are normal  No scleral icterus  Neck: Normal range of motion  No JVD present  Cardiovascular: Normal rate and regular rhythm  Exam reveals no gallop  Murmur heard  Pulmonary/Chest: Effort normal and breath sounds normal  No respiratory distress  He has no wheezes  He has no rales  Abdominal: Soft  Bowel sounds are normal  He exhibits no distension  There is no abdominal tenderness  There is no rebound  Musculoskeletal:         General: No tenderness, deformity or edema  Neurological: He is alert and oriented to person, place, and time  Skin: Skin is warm and dry  He is not diaphoretic  Psychiatric: He has a normal mood and affect   His behavior is normal  Thought content normal        [unfilled]    Invasive Devices     None                 Lab Results   Component Value Date     11/20/2014     05/22/2020    CO2 26 05/22/2020    ANIONGAP 8 11/20/2014    BUN 22 05/22/2020    CREATININE 0 96 05/22/2020    EGFR 78 05/22/2020    GLUCOSE 109 11/20/2014    CALCIUM 8 9 05/22/2020    AST 18 05/22/2020    ALT 32 05/22/2020    ALKPHOS 55 05/22/2020    PROT 7 0 11/20/2014    BILITOT 0 5 11/20/2014     Lab Results   Component Value Date    WBC 5 80 05/22/2020    HGB 13 7 05/22/2020     05/22/2020     No components found for: TROP    Imaging:     EKG: NSr normal ECG     Counseling / Coordination of Care  Total floor / unit time spent today 45 minutes  Greater than 50% of total time was spent with the patient and / or family counseling and / or coordination of care  A description of the counseling / coordination of care

## 2021-03-03 DIAGNOSIS — Z23 ENCOUNTER FOR IMMUNIZATION: ICD-10-CM

## 2021-03-09 ENCOUNTER — IMMUNIZATIONS (OUTPATIENT)
Dept: FAMILY MEDICINE CLINIC | Facility: HOSPITAL | Age: 76
End: 2021-03-09

## 2021-03-09 DIAGNOSIS — Z23 ENCOUNTER FOR IMMUNIZATION: Primary | ICD-10-CM

## 2021-03-09 PROCEDURE — 0001A SARS-COV-2 / COVID-19 MRNA VACCINE (PFIZER-BIONTECH) 30 MCG: CPT

## 2021-03-09 PROCEDURE — 91300 SARS-COV-2 / COVID-19 MRNA VACCINE (PFIZER-BIONTECH) 30 MCG: CPT

## 2021-03-16 DIAGNOSIS — E78.5 HYPERLIPIDEMIA, UNSPECIFIED HYPERLIPIDEMIA TYPE: ICD-10-CM

## 2021-03-16 RX ORDER — SIMVASTATIN 10 MG
TABLET ORAL
Qty: 90 TABLET | Refills: 1 | Status: SHIPPED | OUTPATIENT
Start: 2021-03-16 | End: 2021-10-07

## 2021-03-30 ENCOUNTER — IMMUNIZATIONS (OUTPATIENT)
Dept: FAMILY MEDICINE CLINIC | Facility: HOSPITAL | Age: 76
End: 2021-03-30

## 2021-03-30 DIAGNOSIS — Z23 ENCOUNTER FOR IMMUNIZATION: Primary | ICD-10-CM

## 2021-03-30 PROCEDURE — 0002A SARS-COV-2 / COVID-19 MRNA VACCINE (PFIZER-BIONTECH) 30 MCG: CPT

## 2021-03-30 PROCEDURE — 91300 SARS-COV-2 / COVID-19 MRNA VACCINE (PFIZER-BIONTECH) 30 MCG: CPT

## 2021-10-07 DIAGNOSIS — E78.5 HYPERLIPIDEMIA, UNSPECIFIED HYPERLIPIDEMIA TYPE: ICD-10-CM

## 2021-10-07 RX ORDER — SIMVASTATIN 10 MG
TABLET ORAL
Qty: 90 TABLET | Refills: 0 | Status: SHIPPED | OUTPATIENT
Start: 2021-10-07 | End: 2022-01-26

## 2021-10-07 NOTE — ASSESSMENT & PLAN NOTE
BMI Counseling: Body mass index is 32 53 kg/m²  The BMI is above normal  Nutrition recommendations include reducing portion sizes, 3-5 servings of fruits/vegetables daily, reducing fast food intake and consuming healthier snacks  Exercise recommendations include moderate aerobic physical activity for 150 minutes/week  Previously Declined (within the last year)

## 2021-10-25 ENCOUNTER — TELEPHONE (OUTPATIENT)
Dept: FAMILY MEDICINE CLINIC | Facility: HOSPITAL | Age: 76
End: 2021-10-25

## 2021-10-27 ENCOUNTER — LAB (OUTPATIENT)
Dept: LAB | Facility: HOSPITAL | Age: 76
End: 2021-10-27
Payer: MEDICARE

## 2021-10-27 DIAGNOSIS — E78.5 HYPERLIPIDEMIA, UNSPECIFIED HYPERLIPIDEMIA TYPE: ICD-10-CM

## 2021-10-27 DIAGNOSIS — Z12.5 SCREENING PSA (PROSTATE SPECIFIC ANTIGEN): ICD-10-CM

## 2021-10-27 DIAGNOSIS — Z11.59 NEED FOR HEPATITIS C SCREENING TEST: ICD-10-CM

## 2021-10-27 DIAGNOSIS — R73.03 PREDIABETES: ICD-10-CM
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
## 2021-10-27 LAB
ALBUMIN SERPL BCP-MCNC: 3.5 G/DL (ref 3.5–5)
ALP SERPL-CCNC: 54 U/L (ref 46–116)
ALT SERPL W P-5'-P-CCNC: 40 U/L (ref 12–78)
ANION GAP SERPL CALCULATED.3IONS-SCNC: 3 MMOL/L (ref 4–13)
AST SERPL W P-5'-P-CCNC: 22 U/L (ref 5–45)
BILIRUB SERPL-MCNC: 0.74 MG/DL (ref 0.2–1)
BUN SERPL-MCNC: 15 MG/DL (ref 5–25)
CALCIUM SERPL-MCNC: 9.1 MG/DL (ref 8.3–10.1)
CHLORIDE SERPL-SCNC: 106 MMOL/L (ref 100–108)
CHOLEST SERPL-MCNC: 185 MG/DL (ref 50–200)
CO2 SERPL-SCNC: 28 MMOL/L (ref 21–32)
CREAT SERPL-MCNC: 1.01 MG/DL (ref 0.6–1.3)
ERYTHROCYTE [DISTWIDTH] IN BLOOD BY AUTOMATED COUNT: 12.1 % (ref 11.6–15.1)
GFR SERPL CREATININE-BSD FRML MDRD: 73 ML/MIN/1.73SQ M
GLUCOSE P FAST SERPL-MCNC: 99 MG/DL (ref 65–99)
HCT VFR BLD AUTO: 44 % (ref 36.5–49.3)
HCV AB SER QL: NORMAL
HDLC SERPL-MCNC: 65 MG/DL
HGB BLD-MCNC: 14.1 G/DL (ref 12–17)
LDLC SERPL CALC-MCNC: 100 MG/DL (ref 0–100)
MCH RBC QN AUTO: 33.1 PG (ref 26.8–34.3)
MCHC RBC AUTO-ENTMCNC: 32 G/DL (ref 31.4–37.4)
MCV RBC AUTO: 103 FL (ref 82–98)
PLATELET # BLD AUTO: 236 THOUSANDS/UL (ref 149–390)
PMV BLD AUTO: 10.8 FL (ref 8.9–12.7)
POTASSIUM SERPL-SCNC: 3.9 MMOL/L (ref 3.5–5.3)
PROT SERPL-MCNC: 7 G/DL (ref 6.4–8.2)
PSA SERPL-MCNC: 1 NG/ML (ref 0–4)
RBC # BLD AUTO: 4.26 MILLION/UL (ref 3.88–5.62)
SODIUM SERPL-SCNC: 137 MMOL/L (ref 136–145)
TRIGL SERPL-MCNC: 102 MG/DL
TSH SERPL DL<=0.05 MIU/L-ACNC: 1.04 UIU/ML (ref 0.36–3.74)
WBC # BLD AUTO: 6.69 THOUSAND/UL (ref 4.31–10.16)

## 2021-10-27 PROCEDURE — 86803 HEPATITIS C AB TEST: CPT

## 2021-10-27 PROCEDURE — 80061 LIPID PANEL: CPT

## 2021-10-27 PROCEDURE — 86618 LYME DISEASE ANTIBODY: CPT

## 2021-10-27 PROCEDURE — 80053 COMPREHEN METABOLIC PANEL: CPT

## 2021-10-27 PROCEDURE — 85027 COMPLETE CBC AUTOMATED: CPT

## 2021-10-27 PROCEDURE — G0103 PSA SCREENING: HCPCS

## 2021-10-27 PROCEDURE — 36415 COLL VENOUS BLD VENIPUNCTURE: CPT

## 2021-10-27 PROCEDURE — 84443 ASSAY THYROID STIM HORMONE: CPT

## 2021-10-28 LAB — B BURGDOR IGG+IGM SER-ACNC: 39

## 2021-11-04 ENCOUNTER — OFFICE VISIT (OUTPATIENT)
Dept: FAMILY MEDICINE CLINIC | Facility: HOSPITAL | Age: 76
End: 2021-11-04
Payer: MEDICARE

## 2021-11-04 VITALS
DIASTOLIC BLOOD PRESSURE: 80 MMHG | HEART RATE: 64 BPM | BODY MASS INDEX: 30.07 KG/M2 | TEMPERATURE: 96.8 F | HEIGHT: 69 IN | SYSTOLIC BLOOD PRESSURE: 118 MMHG | WEIGHT: 203 LBS

## 2021-11-04 DIAGNOSIS — E66.3 OVERWEIGHT: ICD-10-CM

## 2021-11-04 DIAGNOSIS — I35.0 MODERATE AORTIC STENOSIS: ICD-10-CM

## 2021-11-04 DIAGNOSIS — Z00.00 MEDICARE ANNUAL WELLNESS VISIT, SUBSEQUENT: Primary | ICD-10-CM

## 2021-11-04 DIAGNOSIS — E78.5 HYPERLIPIDEMIA, UNSPECIFIED HYPERLIPIDEMIA TYPE: ICD-10-CM

## 2021-11-04 PROCEDURE — G0439 PPPS, SUBSEQ VISIT: HCPCS | Performed by: FAMILY MEDICINE

## 2021-11-04 PROCEDURE — 99214 OFFICE O/P EST MOD 30 MIN: CPT | Performed by: FAMILY MEDICINE

## 2021-11-04 RX ORDER — BIMATOPROST 0.01 %
DROPS OPHTHALMIC (EYE)
COMMUNITY
Start: 2021-11-03

## 2021-12-02 ENCOUNTER — HOSPITAL ENCOUNTER (OUTPATIENT)
Dept: NON INVASIVE DIAGNOSTICS | Age: 76
Discharge: HOME | End: 2021-12-02
Payer: MEDICARE

## 2021-12-02 VITALS
DIASTOLIC BLOOD PRESSURE: 80 MMHG | BODY MASS INDEX: 30.07 KG/M2 | SYSTOLIC BLOOD PRESSURE: 118 MMHG | WEIGHT: 203 LBS | HEIGHT: 69 IN

## 2021-12-02 DIAGNOSIS — I35.0 MODERATE AORTIC STENOSIS: ICD-10-CM

## 2021-12-02 LAB
AORTIC ROOT: 3.3 CM
AORTIC VALVE MEAN VELOCITY: 25.5 M/S
APICAL FOUR CHAMBER EJECTION FRACTION: 51 %
AV AREA BY CONTINUOUS VTI: 1 CM2
AV AREA PEAK VELOCITY: 0.9 CM2
AV LVOT MEAN GRADIENT: 1 MMHG
AV LVOT PEAK GRADIENT: 2 MMHG
AV MEAN GRADIENT: 29 MMHG
AV PEAK GRADIENT: 47 MMHG
AV VALVE AREA: 0.97 CM2
DOP CALC AO VTI: 83.64 CM
DOP CALC LVOT AREA: 4.15 CM2
DOP CALC LVOT DIAMETER: 2.3 CM
DOP CALC LVOT PEAK VEL VTI: 19.61 CM
DOP CALC LVOT PEAK VEL: 0.74 M/S
DOP CALC LVOT STROKE INDEX: 40.4 ML/M2
DOP CALC LVOT STROKE VOLUME: 81.43 CM3
E WAVE DECELERATION TIME: 168 MS
FRACTIONAL SHORTENING: 30 % (ref 28–44)
INTERVENTRICULAR SEPTUM IN DIASTOLE (PARASTERNAL SHORT AXIS VIEW): 1.2 CM
LAAS-AP2: 23.7 CM2
LAAS-AP4: 26.4 CM2
LEFT ATRIUM AREA SYSTOLE SINGLE PLANE A4C: 22.5 CM2
LEFT INTERNAL DIMENSION IN SYSTOLE: 3.8 CM (ref 2.1–4)
LEFT VENTRICLE DIASTOLIC VOLUME (MOD BIPLANE): 164 ML
LEFT VENTRICLE SYSTOLIC VOLUME (MOD BIPLANE): 80 ML
LEFT VENTRICULAR INTERNAL DIMENSION IN DIASTOLE: 5.4 CM (ref 5.64–8.4)
LEFT VENTRICULAR POSTERIOR WALL IN END DIASTOLE: 1.3 CM
LEFT VENTRICULAR STROKE VOLUME: 82 ML
LV EF: 51 %
MV E'TISSUE VEL-SEP: 6 CM/S
MV PEAK A VEL: 0.79 M/S
MV PEAK E VEL: 72 CM/S
MV STENOSIS PRESSURE HALF TIME: 0 MS
PA SYSTOLIC PRESSURE: 36 MMHG
RIGHT ATRIUM AREA SYSTOLE A4C: 19.7 CM2
RIGHT VENTRICLE ID DIMENSION: 2.8 CM
SL CV LV EF: 55
SL CV PED ECHO LEFT VENTRICLE DIASTOLIC VOLUME (MOD BIPLANE) 2D: 144 ML
SL CV PED ECHO LEFT VENTRICLE SYSTOLIC VOLUME (MOD BIPLANE) 2D: 62 ML
TRICUSPID VALVE PEAK REGURGITATION VELOCITY: 2.88 M/S
TRICUSPID VALVE S': 70 CM/S
TV PEAK GRADIENT: 33 MMHG
Z-SCORE OF LEFT VENTRICULAR DIMENSION IN END SYSTOLE: -2.42

## 2021-12-02 PROCEDURE — 93306 TTE W/DOPPLER COMPLETE: CPT

## 2021-12-02 PROCEDURE — 93306 TTE W/DOPPLER COMPLETE: CPT | Performed by: INTERNAL MEDICINE

## 2021-12-08 ENCOUNTER — TELEPHONE (OUTPATIENT)
Dept: CARDIOLOGY CLINIC | Facility: CLINIC | Age: 76
End: 2021-12-08

## 2022-01-14 ENCOUNTER — OFFICE VISIT (OUTPATIENT)
Dept: CARDIOLOGY CLINIC | Facility: CLINIC | Age: 77
End: 2022-01-14
Payer: MEDICARE

## 2022-01-14 VITALS
HEIGHT: 69 IN | SYSTOLIC BLOOD PRESSURE: 158 MMHG | DIASTOLIC BLOOD PRESSURE: 68 MMHG | BODY MASS INDEX: 30.21 KG/M2 | WEIGHT: 204 LBS | HEART RATE: 64 BPM

## 2022-01-14 DIAGNOSIS — I35.0 SEVERE AORTIC STENOSIS: Primary | ICD-10-CM

## 2022-01-14 PROCEDURE — 99214 OFFICE O/P EST MOD 30 MIN: CPT | Performed by: INTERNAL MEDICINE

## 2022-01-14 NOTE — PROGRESS NOTES
Cardiology Follow Up    Rhianna Pak  1945  [id number]  [facility] Miguelangel Tejeda  [address] [address]  [phone]    1  Severe aortic stenosis         Interval History: Followup aortic stenosis    He has dyspnea with moderate exertion that is stable  He has no chest pain or palpitations  We reviewed his echocardiogram      Medical Problems             Problem List     Cataract    Hyperlipidemia    Umbilical hernia without obstruction and without gangrene    Prediabetes    Moderate aortic stenosis    Rectus diastasis    Obesity (BMI 30 0-34  9)    Laceration of right palm              Past Medical History:   Diagnosis Date    Cataract     Heart murmur     Hyperlipidemia     Moderate aortic stenosis     Obesity     Prediabetes     Umbilical hernia      Social History     Socioeconomic History    Marital status: /Civil Union     Spouse name: Not on file    Number of children: Not on file    Years of education: Not on file    Highest education level: Not on file   Occupational History    Not on file   Tobacco Use    Smoking status: Never Smoker    Smokeless tobacco: Never Used   Vaping Use    Vaping Use: Never used   Substance and Sexual Activity    Alcohol use: Yes     Comment: social    Drug use: No    Sexual activity: Not on file   Other Topics Concern    Not on file   Social History Narrative    Not on file     Social Determinants of Health     Financial Resource Strain: Not on file   Food Insecurity: Not on file   Transportation Needs: Not on file   Physical Activity: Not on file   Stress: Not on file   Social Connections: Not on file   Intimate Partner Violence: Not on file   Housing Stability: Not on file      Family History   Problem Relation Age of Onset    Colon cancer Mother     Other Father         heart problem    Parkinsonism Family      Past Surgical History: Procedure Laterality Date    SHOULDER SURGERY  2012    TONSILLECTOMY      childhood       Current Outpatient Medications:     Lumigan 0 01 % ophthalmic drops, , Disp: , Rfl:     Multiple Vitamin (MULTI-VITAMIN DAILY PO), Take by mouth, Disp: , Rfl:     Omega-3 Fatty Acids (fish oil) 1,000 mg, Take 1,000 mg by mouth daily, Disp: , Rfl:     simvastatin (ZOCOR) 10 mg tablet, Take 1 tablet by mouth once daily, Disp: 90 tablet, Rfl: 0  No Known Allergies    Labs:     Chemistry        Component Value Date/Time     11/20/2014 0905    K 3 9 10/27/2021 0710    K 4 4 11/20/2014 0905     10/27/2021 0710     11/20/2014 0905    CO2 28 10/27/2021 0710    CO2 27 11/20/2014 0905    BUN 15 10/27/2021 0710    BUN 17 11/20/2014 0905    CREATININE 1 01 10/27/2021 0710    CREATININE 0 77 11/20/2014 0905        Component Value Date/Time    CALCIUM 9 1 10/27/2021 0710    CALCIUM 8 8 11/20/2014 0905    ALKPHOS 54 10/27/2021 0710    ALKPHOS 71 11/20/2014 0905    AST 22 10/27/2021 0710    AST 27 11/20/2014 0905    ALT 40 10/27/2021 0710    ALT 46 11/20/2014 0905    BILITOT 0 5 11/20/2014 0905            Lab Results   Component Value Date    CHOL 246 11/20/2014     Lab Results   Component Value Date    HDL 65 10/27/2021    HDL 55 05/22/2020    HDL 61 (H) 04/05/2019     Lab Results   Component Value Date    LDLCALC 100 10/27/2021    LDLCALC 93 05/22/2020    LDLCALC 109 (H) 04/05/2019     Lab Results   Component Value Date    TRIG 102 10/27/2021    TRIG 61 05/22/2020    TRIG 93 04/05/2019     No results found for: CHOLHDL    Imaging: No results found  Review of Systems   Constitutional: Negative  HENT: Negative  Eyes: Negative  Cardiovascular: Positive for dyspnea on exertion  Respiratory: Positive for shortness of breath  Endocrine: Negative  Hematologic/Lymphatic: Negative  Skin: Negative  Musculoskeletal: Negative  Gastrointestinal: Negative  Genitourinary: Negative  Neurological: Negative  Psychiatric/Behavioral: Negative  Allergic/Immunologic: Negative  Vitals:    01/14/22 0802   BP: 158/68   Pulse: 64           Physical Exam  Vitals reviewed  Constitutional:       Appearance: Normal appearance  HENT:      Head: Normocephalic  Nose: Nose normal       Mouth/Throat:      Mouth: Mucous membranes are moist       Pharynx: Oropharynx is clear  Eyes:      General: No scleral icterus  Conjunctiva/sclera: Conjunctivae normal    Cardiovascular:      Rate and Rhythm: Normal rate and regular rhythm  Heart sounds: Murmur heard  No friction rub  No gallop  Pulmonary:      Effort: Pulmonary effort is normal  No respiratory distress  Breath sounds: Normal breath sounds  No wheezing or rales  Abdominal:      General: Abdomen is flat  Bowel sounds are normal  There is no distension  Palpations: Abdomen is soft  Tenderness: There is no abdominal tenderness  There is no guarding  Musculoskeletal:      Cervical back: Normal range of motion and neck supple  Right lower leg: No edema  Left lower leg: No edema  Skin:     General: Skin is warm and dry  Neurological:      General: No focal deficit present  Mental Status: He is alert and oriented to person, place, and time  Psychiatric:         Mood and Affect: Mood normal          Behavior: Behavior normal          Discussion/Summary:    Severe Aortic Stenosis: He is symptomatic with dyspnea on exertion  AVA0  9 cm2 with mean gradient 33 mmHg  Will refer to TAVR program for evaluation for TAVR  The patient was counseled regarding diagnostic results, instructions for management, risk factor reductions, impressions  total time of encounter was 25 minutes and 15 minutes was spent counseling

## 2022-01-26 DIAGNOSIS — E78.5 HYPERLIPIDEMIA, UNSPECIFIED HYPERLIPIDEMIA TYPE: ICD-10-CM

## 2022-01-26 RX ORDER — SIMVASTATIN 10 MG
TABLET ORAL
Qty: 90 TABLET | Refills: 0 | Status: SHIPPED | OUTPATIENT
Start: 2022-01-26 | End: 2022-05-01

## 2022-02-02 ENCOUNTER — OFFICE VISIT (OUTPATIENT)
Dept: CARDIAC SURGERY | Facility: CLINIC | Age: 77
End: 2022-02-02
Payer: MEDICARE

## 2022-02-02 ENCOUNTER — LAB (OUTPATIENT)
Dept: LAB | Facility: HOSPITAL | Age: 77
End: 2022-02-02
Payer: MEDICARE

## 2022-02-02 VITALS
HEIGHT: 69 IN | HEART RATE: 67 BPM | BODY MASS INDEX: 30.36 KG/M2 | OXYGEN SATURATION: 99 % | TEMPERATURE: 97.3 F | RESPIRATION RATE: 18 BRPM | WEIGHT: 205 LBS | DIASTOLIC BLOOD PRESSURE: 69 MMHG | SYSTOLIC BLOOD PRESSURE: 152 MMHG

## 2022-02-02 DIAGNOSIS — I35.0 NONRHEUMATIC AORTIC VALVE STENOSIS: Primary | ICD-10-CM

## 2022-02-02 DIAGNOSIS — R06.00 DOE (DYSPNEA ON EXERTION): ICD-10-CM

## 2022-02-02 DIAGNOSIS — Z13.6 ENCOUNTER FOR SCREENING FOR STENOSIS OF CAROTID ARTERY: ICD-10-CM

## 2022-02-02 DIAGNOSIS — Z01.818 PRE-OP TESTING: ICD-10-CM

## 2022-02-02 DIAGNOSIS — Z13.6 ENCOUNTER FOR SPECIAL SCREENING EXAMINATION FOR CARDIOVASCULAR DISORDER: ICD-10-CM

## 2022-02-02 DIAGNOSIS — I35.0 NONRHEUMATIC AORTIC VALVE STENOSIS: ICD-10-CM

## 2022-02-02 LAB
ALBUMIN SERPL BCP-MCNC: 4 G/DL (ref 3.5–5)
ALP SERPL-CCNC: 51 U/L (ref 46–116)
ALT SERPL W P-5'-P-CCNC: 44 U/L (ref 12–78)
ANION GAP SERPL CALCULATED.3IONS-SCNC: 4 MMOL/L (ref 4–13)
AST SERPL W P-5'-P-CCNC: 25 U/L (ref 5–45)
BILIRUB SERPL-MCNC: 0.49 MG/DL (ref 0.2–1)
BUN SERPL-MCNC: 22 MG/DL (ref 5–25)
CALCIUM SERPL-MCNC: 9.5 MG/DL (ref 8.3–10.1)
CHLORIDE SERPL-SCNC: 107 MMOL/L (ref 100–108)
CO2 SERPL-SCNC: 28 MMOL/L (ref 21–32)
CREAT SERPL-MCNC: 1 MG/DL (ref 0.6–1.3)
GFR SERPL CREATININE-BSD FRML MDRD: 73 ML/MIN/1.73SQ M
GLUCOSE P FAST SERPL-MCNC: 98 MG/DL (ref 65–99)
POTASSIUM SERPL-SCNC: 4.4 MMOL/L (ref 3.5–5.3)
PROT SERPL-MCNC: 7.2 G/DL (ref 6.4–8.2)
SODIUM SERPL-SCNC: 139 MMOL/L (ref 136–145)

## 2022-02-02 PROCEDURE — 99205 OFFICE O/P NEW HI 60 MIN: CPT | Performed by: NURSE PRACTITIONER

## 2022-02-02 PROCEDURE — 80053 COMPREHEN METABOLIC PANEL: CPT

## 2022-02-02 PROCEDURE — 36415 COLL VENOUS BLD VENIPUNCTURE: CPT

## 2022-02-02 NOTE — LETTER
February 2, 2022     Lennox Aguirre MD  [address] [address]    Patient: Mirna Munoz   YOB: 1945   Date of Visit: 2/2/2022       Dear Dr Mounika Galicia:    Thank you for referring Lynn Low to me for evaluation  Below are my notes for this consultation  If you have questions, please do not hesitate to call me  I look forward to following your patient along with you  Sincerely,        Tan Monique,         CC: MD Abdirahman Portillo CRNP  2/2/2022 10:49 AM  Attested  Consultation - Cardiothoracic Surgery   Mirna Munoz 77 y o  male MRN: [id number]    Physician Requesting Consult: Dr Mounika Galicia    Reason for Consult / Principal Problem: Aortic stenosis, Non-Rheumatic    History of Present Illness: Mirna Munoz is a 77y o  year old male who presents for initial outpatient surgical consultation for symptomatic severe aortic stenosis  Patient's PMHx is notable for AS, HLD, Glaucoma, pre-Diabetes and obesity (BMI 30 27)  Patient's PCP detected a heart murmur in 2018 and obtained and echocardiogram which demonstrated moderate AS  He has been followed with serial echocardiograms since then  Patient was referred for cardiology consultation and seen by Dr Mounika Galicia in January 2021  Most recent echo in December 2021 demonstrates progression of known AS to severe range  Upon interview today patient reports exertional dyspnea and lightheadedness  He walks 3-4 miles per day and notices dyspnea with inclines  He states it requires "more effort" than usual  He reports "flashing" headaches with the lightheadedness  He denies chest pain, palpitaitons, pre-syncope or syncope, weight gain, edema, PND or orthopnea  Patient is a lifelong non-smoker  He drinks socially; a few beer per week with friends and an occasional glass of wine with his wife  Patient is a retired mailman   He lives with his wife and is independent with ADL's    Patient is up to date with dental care (seen 2 weeks ago)    Covid vaccinations: 3/9/21 & 3/30/21; booster 11/8/21        Past Medical History:  Past Medical History:   Diagnosis Date    Cataract     Heart murmur     Hyperlipidemia     Moderate aortic stenosis     Obesity     Prediabetes     Umbilical hernia          Past Surgical History:   Past Surgical History:   Procedure Laterality Date    ARTHROSCOPY KNEE Right     BUNIONECTOMY Right     HEMORRHOID SURGERY      SHOULDER SURGERY  2012    TONSILLECTOMY      childhood         Family History:  Family History   Problem Relation Age of Onset    Colon cancer Mother     Other Father         heart problem    Parkinsonism Family          Social History:    Social History     Substance and Sexual Activity   Alcohol Use Yes    Alcohol/week: 2 0 standard drinks    Types: 2 Cans of beer per week    Comment: twice a wk      Social History     Substance and Sexual Activity   Drug Use No     Social History     Tobacco Use   Smoking Status Never Smoker   Smokeless Tobacco Never Used         Home Medications:   Prior to Admission medications    Medication Sig Start Date End Date Taking?  Authorizing Provider   Lumigan 0 01 % ophthalmic drops  11/3/21  Yes Historical Provider, MD   Multiple Vitamin (MULTI-VITAMIN DAILY PO) Take by mouth 11/19/14  Yes Historical Provider, MD   Omega-3 Fatty Acids (fish oil) 1,000 mg Take 1,000 mg by mouth daily   Yes Historical Provider, MD   simvastatin (ZOCOR) 10 mg tablet Take 1 tablet by mouth once daily 1/26/22  Yes Chet Delarosa MD       Allergies:  No Known Allergies    Review of Systems:  Review of Systems - History obtained from chart review and the patient  General ROS: positive for  - fatigue and change in activity tolerance  negative for - chills, fatigue, sleep disturbance or weight gain  Psychological ROS: negative  Ophthalmic ROS: positive for - uses glasses  ENT ROS: negative  Allergy and Immunology ROS: negative  Hematological and Lymphatic ROS: negative for - bleeding problems, blood clots, bruising or jaundice  Endocrine ROS: negative  Breast ROS: negative  Respiratory ROS: no cough, shortness of breath, or wheezing  Cardiovascular ROS: positive for - dyspnea on exertion and murmur  negative for - chest pain, edema, irregular heartbeat, loss of consciousness, orthopnea, palpitations, paroxysmal nocturnal dyspnea or rapid heart rate  Gastrointestinal ROS: no abdominal pain, change in bowel habits, or black or bloody stools  Genito-Urinary ROS: no dysuria, trouble voiding, or hematuria  Musculoskeletal ROS: negative  Neurological ROS: positive for - lightheadedness  negative for - impaired coordination/balance, memory loss, numbness/tingling, seizures, speech problems, tremors, visual changes or weakness  Dermatological ROS: negative    Vital Signs:     Vitals:    02/02/22 1002 02/02/22 1006   BP: 160/76 152/69   BP Location: Left arm Right arm   Patient Position: Sitting    Cuff Size: Standard    Pulse: 67    Resp: 18    Temp: (!) 97 3 °F (36 3 °C)    TempSrc: Tympanic    SpO2: 99%    Weight: 93 kg (205 lb)    Height: 5' 9" (1 753 m)        Physical Exam:    General: Alert, oriented, well developed, no acute distress  HEENT/NECK:  PERRLA  No jugular venous distention  Cardiac:Regular rate and rhythm, III/VI harsh systolic murmur RUSB   Carotid arteries: 1+ pulses, bruits vs radiation of cardiac murmur to neck  Pulmonary:  Breath sounds clear bilaterally  Abdomen:  Non-tender, Non-distended  Positive bowel sounds  Upper extremities: 2+ radial pulses; brisk capillary refill  Lower extremities: Extremities warm/dry  PT/DP pulses 2+ bilaterally  No edema B/L  Neuro: Alert and oriented X 3  Sensation is grossly intact  No focal deficits  Musculoskeletal: MAEE, stable gait  Skin: Warm/Dry, without rashes or lesions        Lab Results:   Lab Results   Component Value Date    WBC 6 69 10/27/2021    HGB 14 1 10/27/2021    HCT 44 0 10/27/2021     (H) 10/27/2021     10/27/2021     Lab Results   Component Value Date     11/20/2014    SODIUM 137 10/27/2021    K 3 9 10/27/2021     10/27/2021    CO2 28 10/27/2021    ANIONGAP 8 11/20/2014    AGAP 3 (L) 10/27/2021    BUN 15 10/27/2021    CREATININE 1 01 10/27/2021    GLUC 113 03/31/2016    GLUF 99 10/27/2021    CALCIUM 9 1 10/27/2021    AST 22 10/27/2021    ALT 40 10/27/2021    ALKPHOS 54 10/27/2021    PROT 7 0 11/20/2014    TP 7 0 10/27/2021    BILITOT 0 5 11/20/2014    TBILI 0 74 10/27/2021    EGFR 73 10/27/2021     Lab Results   Component Value Date    HGBA1C 5 4 05/22/2020     No results found for: CKTOTAL, CKMB, CKMBINDEX, TROPONINI    Imaging Studies:     Echocardiogram: 12/2/21      Left Ventricle: Left ventricular cavity size is normal  The left ventricular ejection fraction is 55%  Systolic function is normal  Wall motion is normal  Diastolic function is mildly abnormal, consistent with grade I (abnormal) relaxation  Wall thickness is mildly increased    Left Atrium: The atrium is mildly dilated    Aortic Valve: The aortic valve is trileaflet  The leaflets are severely thickened  The leaflets are severely calcified  There is severely reduced mobility  There is mild regurgitation  There is moderate to severe stenosis (peak velocity 3 5 m/sec, mean gradient 30 mm Hg, aortic valve area 1 0 cm2 by continuity equation)    Pulmonary Artery: The estimated pulmonary artery systolic pressure is 41 2 mmHg   The pulmonary artery systolic pressure is mildly increased        I have personally reviewed pertinent films in PACS     PCP and Cardiology notes reviewed     TAVR evaluation Assessment:     Ritu Weinstein: III    5 Meter Walk: 5 sec, 4 sec, 5 sec    STS risk score (preliminary): 1 0%    KCCQ-12 completed    Assessment:  Patient Active Problem List    Diagnosis Date Noted    Laceration of right palm 11/07/2020    Nonrheumatic aortic valve stenosis 04/05/2019    Rectus diastasis 04/05/2019    Obesity (BMI 30 0-34 9) 04/05/2019    Prediabetes 03/05/2018    Cataract 66/37/1145    Umbilical hernia without obstruction and without gangrene 03/30/2016    Hyperlipidemia 11/20/2014     Severe aortic stenosis; Ongoing TAVR workup    Plan:    Meka Main has symptomatic severe aortic stenosis  They will undergo the following testing for transcatheter aortic valve replacement: Gated CTA of the chest/abdomen/pelvis,  cardiac catheterization, dental clearance and carotid artery ultrasound  Once these studies have been completed, Meka Main will follow up in our office to review the results and to be evaluated to confirm the suitability of proceeding with transcatheter aortic valve replacment  Meka Main was comfortable with our recommendations, and their questions were answered to their satisfaction  The patient recently had a screening colonoscopy in 6/17/21  Therefore GI referral is not indicated at this time  CLAUDIO Kerr  DATE: February 2, 2022  TIME: 10:43 AM  Attestation signed by Fabian Montanez DO at 2/2/2022 11:01 AM:  The patient was seen and examined, and I agree with the midlevel's history, physical exam, assessment and plan with the following additions:    Mr Michael Fischer was seen in the office today for evaluation of his aortic stenosis  His echocardiogram was reviewed by myself personally findings discussed with him  This demonstrates severe aortic stenosis  He is symptomatic  We discussed treatment options and I recommended TAVR  The risks, benefits, and alternatives to TAVR been discussed in detail  He consents to proceed with TAVR

## 2022-02-02 NOTE — H&P (VIEW-ONLY)
Consultation - Cardiothoracic Surgery   Amena Edwards 77 y o  male MRN: [id number]    Physician Requesting Consult: Dr Shonda Jordan    Reason for Consult / Principal Problem: Aortic stenosis, Non-Rheumatic    History of Present Illness: Amena Edwards is a 77y o  year old male who presents for initial outpatient surgical consultation for symptomatic severe aortic stenosis  Patient's PMHx is notable for AS, HLD, Glaucoma, pre-Diabetes and obesity (BMI 30 27)  Patient's PCP detected a heart murmur in 2018 and obtained and echocardiogram which demonstrated moderate AS  He has been followed with serial echocardiograms since then  Patient was referred for cardiology consultation and seen by Dr Shonda Jordan in January 2021  Most recent echo in December 2021 demonstrates progression of known AS to severe range  Upon interview today patient reports exertional dyspnea and lightheadedness  He walks 3-4 miles per day and notices dyspnea with inclines  He states it requires "more effort" than usual  He reports "flashing" headaches with the lightheadedness  He denies chest pain, palpitaitons, pre-syncope or syncope, weight gain, edema, PND or orthopnea  Patient is a lifelong non-smoker  He drinks socially; a few beer per week with friends and an occasional glass of wine with his wife  Patient is a retired mailman   He lives with his wife and is independent with ADL's    Patient is up to date with dental care (seen 2 weeks ago)    Covid vaccinations: 3/9/21 & 3/30/21; booster 11/8/21        Past Medical History:  Past Medical History:   Diagnosis Date    Cataract     Heart murmur     Hyperlipidemia     Moderate aortic stenosis     Obesity     Prediabetes     Umbilical hernia          Past Surgical History:   Past Surgical History:   Procedure Laterality Date    ARTHROSCOPY KNEE Right     BUNIONECTOMY Right     HEMORRHOID SURGERY      SHOULDER SURGERY  2012    TONSILLECTOMY      childhood         Family History:  Family History   Problem Relation Age of Onset    Colon cancer Mother     Other Father         heart problem    Parkinsonism Family          Social History:    Social History     Substance and Sexual Activity   Alcohol Use Yes    Alcohol/week: 2 0 standard drinks    Types: 2 Cans of beer per week    Comment: twice a wk      Social History     Substance and Sexual Activity   Drug Use No     Social History     Tobacco Use   Smoking Status Never Smoker   Smokeless Tobacco Never Used         Home Medications:   Prior to Admission medications    Medication Sig Start Date End Date Taking?  Authorizing Provider   Lumigan 0 01 % ophthalmic drops  11/3/21  Yes Historical Provider, MD   Multiple Vitamin (MULTI-VITAMIN DAILY PO) Take by mouth 11/19/14  Yes Historical Provider, MD   Omega-3 Fatty Acids (fish oil) 1,000 mg Take 1,000 mg by mouth daily   Yes Historical Provider, MD   simvastatin (ZOCOR) 10 mg tablet Take 1 tablet by mouth once daily 1/26/22  Yes Chet Delarosa MD       Allergies:  No Known Allergies    Review of Systems:  Review of Systems - History obtained from chart review and the patient  General ROS: positive for  - fatigue and change in activity tolerance  negative for - chills, fatigue, sleep disturbance or weight gain  Psychological ROS: negative  Ophthalmic ROS: positive for - uses glasses  ENT ROS: negative  Allergy and Immunology ROS: negative  Hematological and Lymphatic ROS: negative for - bleeding problems, blood clots, bruising or jaundice  Endocrine ROS: negative  Breast ROS: negative  Respiratory ROS: no cough, shortness of breath, or wheezing  Cardiovascular ROS: positive for - dyspnea on exertion and murmur  negative for - chest pain, edema, irregular heartbeat, loss of consciousness, orthopnea, palpitations, paroxysmal nocturnal dyspnea or rapid heart rate  Gastrointestinal ROS: no abdominal pain, change in bowel habits, or black or bloody stools  Genito-Urinary ROS: no dysuria, trouble voiding, or hematuria  Musculoskeletal ROS: negative  Neurological ROS: positive for - lightheadedness  negative for - impaired coordination/balance, memory loss, numbness/tingling, seizures, speech problems, tremors, visual changes or weakness  Dermatological ROS: negative    Vital Signs:     Vitals:    02/02/22 1002 02/02/22 1006   BP: 160/76 152/69   BP Location: Left arm Right arm   Patient Position: Sitting    Cuff Size: Standard    Pulse: 67    Resp: 18    Temp: (!) 97 3 °F (36 3 °C)    TempSrc: Tympanic    SpO2: 99%    Weight: 93 kg (205 lb)    Height: 5' 9" (1 753 m)        Physical Exam:    General: Alert, oriented, well developed, no acute distress  HEENT/NECK:  PERRLA  No jugular venous distention  Cardiac:Regular rate and rhythm, III/VI harsh systolic murmur RUSB   Carotid arteries: 1+ pulses, bruits vs radiation of cardiac murmur to neck  Pulmonary:  Breath sounds clear bilaterally  Abdomen:  Non-tender, Non-distended  Positive bowel sounds  Upper extremities: 2+ radial pulses; brisk capillary refill  Lower extremities: Extremities warm/dry  PT/DP pulses 2+ bilaterally  No edema B/L  Neuro: Alert and oriented X 3  Sensation is grossly intact  No focal deficits  Musculoskeletal: MAEE, stable gait  Skin: Warm/Dry, without rashes or lesions        Lab Results:   Lab Results   Component Value Date    WBC 6 69 10/27/2021    HGB 14 1 10/27/2021    HCT 44 0 10/27/2021     (H) 10/27/2021     10/27/2021     Lab Results   Component Value Date     11/20/2014    SODIUM 137 10/27/2021    K 3 9 10/27/2021     10/27/2021    CO2 28 10/27/2021    ANIONGAP 8 11/20/2014    AGAP 3 (L) 10/27/2021    BUN 15 10/27/2021    CREATININE 1 01 10/27/2021    GLUC 113 03/31/2016    GLUF 99 10/27/2021    CALCIUM 9 1 10/27/2021    AST 22 10/27/2021    ALT 40 10/27/2021    ALKPHOS 54 10/27/2021    PROT 7 0 11/20/2014    TP 7 0 10/27/2021    BILITOT 0 5 11/20/2014    TBILI 0 74 10/27/2021    EGFR 73 10/27/2021     Lab Results   Component Value Date    HGBA1C 5 4 05/22/2020     No results found for: CKTOTAL, CKMB, CKMBINDEX, TROPONINI    Imaging Studies:     Echocardiogram: 12/2/21      Left Ventricle: Left ventricular cavity size is normal  The left ventricular ejection fraction is 55%  Systolic function is normal  Wall motion is normal  Diastolic function is mildly abnormal, consistent with grade I (abnormal) relaxation  Wall thickness is mildly increased    Left Atrium: The atrium is mildly dilated    Aortic Valve: The aortic valve is trileaflet  The leaflets are severely thickened  The leaflets are severely calcified  There is severely reduced mobility  There is mild regurgitation  There is moderate to severe stenosis (peak velocity 3 5 m/sec, mean gradient 30 mm Hg, aortic valve area 1 0 cm2 by continuity equation)    Pulmonary Artery: The estimated pulmonary artery systolic pressure is 59 3 mmHg  The pulmonary artery systolic pressure is mildly increased        I have personally reviewed pertinent films in PACS     PCP and Cardiology notes reviewed     TAVR evaluation Assessment:     FSLogix Screen: III    5 Meter Walk: 5 sec, 4 sec, 5 sec    STS risk score (preliminary): 1 0%    KCCQ-12 completed    Assessment:  Patient Active Problem List    Diagnosis Date Noted    Laceration of right palm 11/07/2020    Nonrheumatic aortic valve stenosis 04/05/2019    Rectus diastasis 04/05/2019    Obesity (BMI 30 0-34 9) 04/05/2019    Prediabetes 03/05/2018    Cataract 58/02/5517    Umbilical hernia without obstruction and without gangrene 03/30/2016    Hyperlipidemia 11/20/2014     Severe aortic stenosis; Ongoing TAVR workup    Plan:    Mely Henao has symptomatic severe aortic stenosis  They will undergo the following testing for transcatheter aortic valve replacement: Gated CTA of the chest/abdomen/pelvis,  cardiac catheterization, dental clearance and carotid artery ultrasound  Once these studies have been completed, Maria Lora Dinhvert will follow up in our office to review the results and to be evaluated to confirm the suitability of proceeding with transcatheter aortic valve replacment  Maria L Misa was comfortable with our recommendations, and their questions were answered to their satisfaction  The patient recently had a screening colonoscopy in 6/17/21  Therefore GI referral is not indicated at this time       SIGNATURE: CLAUDIO Pizarro  DATE: February 2, 2022  TIME: 10:43 AM

## 2022-02-02 NOTE — H&P (VIEW-ONLY)
Consultation - Cardiothoracic Surgery   Dylan Yeboah 77 y o  male MRN: [id number]    Physician Requesting Consult: Dr Aylin Fischer    Reason for Consult / Principal Problem: Aortic stenosis, Non-Rheumatic    History of Present Illness: Dylan Yeboah is a 77y o  year old male who presents for initial outpatient surgical consultation for symptomatic severe aortic stenosis  Patient's PMHx is notable for AS, HLD, Glaucoma, pre-Diabetes and obesity (BMI 30 27)  Patient's PCP detected a heart murmur in 2018 and obtained and echocardiogram which demonstrated moderate AS  He has been followed with serial echocardiograms since then  Patient was referred for cardiology consultation and seen by Dr Aylin Fischer in January 2021  Most recent echo in December 2021 demonstrates progression of known AS to severe range  Upon interview today patient reports exertional dyspnea and lightheadedness  He walks 3-4 miles per day and notices dyspnea with inclines  He states it requires "more effort" than usual  He reports "flashing" headaches with the lightheadedness  He denies chest pain, palpitaitons, pre-syncope or syncope, weight gain, edema, PND or orthopnea  Patient is a lifelong non-smoker  He drinks socially; a few beer per week with friends and an occasional glass of wine with his wife  Patient is a retired mailman   He lives with his wife and is independent with ADL's    Patient is up to date with dental care (seen 2 weeks ago)    Covid vaccinations: 3/9/21 & 3/30/21; booster 11/8/21        Past Medical History:  Past Medical History:   Diagnosis Date    Cataract     Heart murmur     Hyperlipidemia     Moderate aortic stenosis     Obesity     Prediabetes     Umbilical hernia          Past Surgical History:   Past Surgical History:   Procedure Laterality Date    ARTHROSCOPY KNEE Right     BUNIONECTOMY Right     HEMORRHOID SURGERY      SHOULDER SURGERY  2012    TONSILLECTOMY      childhood         Family History:  Family History   Problem Relation Age of Onset    Colon cancer Mother     Other Father         heart problem    Parkinsonism Family          Social History:    Social History     Substance and Sexual Activity   Alcohol Use Yes    Alcohol/week: 2 0 standard drinks    Types: 2 Cans of beer per week    Comment: twice a wk      Social History     Substance and Sexual Activity   Drug Use No     Social History     Tobacco Use   Smoking Status Never Smoker   Smokeless Tobacco Never Used         Home Medications:   Prior to Admission medications    Medication Sig Start Date End Date Taking?  Authorizing Provider   Lumigan 0 01 % ophthalmic drops  11/3/21  Yes Historical Provider, MD   Multiple Vitamin (MULTI-VITAMIN DAILY PO) Take by mouth 11/19/14  Yes Historical Provider, MD   Omega-3 Fatty Acids (fish oil) 1,000 mg Take 1,000 mg by mouth daily   Yes Historical Provider, MD   simvastatin (ZOCOR) 10 mg tablet Take 1 tablet by mouth once daily 1/26/22  Yes Chet Delarosa MD       Allergies:  No Known Allergies    Review of Systems:  Review of Systems - History obtained from chart review and the patient  General ROS: positive for  - fatigue and change in activity tolerance  negative for - chills, fatigue, sleep disturbance or weight gain  Psychological ROS: negative  Ophthalmic ROS: positive for - uses glasses  ENT ROS: negative  Allergy and Immunology ROS: negative  Hematological and Lymphatic ROS: negative for - bleeding problems, blood clots, bruising or jaundice  Endocrine ROS: negative  Breast ROS: negative  Respiratory ROS: no cough, shortness of breath, or wheezing  Cardiovascular ROS: positive for - dyspnea on exertion and murmur  negative for - chest pain, edema, irregular heartbeat, loss of consciousness, orthopnea, palpitations, paroxysmal nocturnal dyspnea or rapid heart rate  Gastrointestinal ROS: no abdominal pain, change in bowel habits, or black or bloody stools  Genito-Urinary ROS: no dysuria, trouble voiding, or hematuria  Musculoskeletal ROS: negative  Neurological ROS: positive for - lightheadedness  negative for - impaired coordination/balance, memory loss, numbness/tingling, seizures, speech problems, tremors, visual changes or weakness  Dermatological ROS: negative    Vital Signs:     Vitals:    02/02/22 1002 02/02/22 1006   BP: 160/76 152/69   BP Location: Left arm Right arm   Patient Position: Sitting    Cuff Size: Standard    Pulse: 67    Resp: 18    Temp: (!) 97 3 °F (36 3 °C)    TempSrc: Tympanic    SpO2: 99%    Weight: 93 kg (205 lb)    Height: 5' 9" (1 753 m)        Physical Exam:    General: Alert, oriented, well developed, no acute distress  HEENT/NECK:  PERRLA  No jugular venous distention  Cardiac:Regular rate and rhythm, III/VI harsh systolic murmur RUSB   Carotid arteries: 1+ pulses, bruits vs radiation of cardiac murmur to neck  Pulmonary:  Breath sounds clear bilaterally  Abdomen:  Non-tender, Non-distended  Positive bowel sounds  Upper extremities: 2+ radial pulses; brisk capillary refill  Lower extremities: Extremities warm/dry  PT/DP pulses 2+ bilaterally  No edema B/L  Neuro: Alert and oriented X 3  Sensation is grossly intact  No focal deficits  Musculoskeletal: MAEE, stable gait  Skin: Warm/Dry, without rashes or lesions        Lab Results:   Lab Results   Component Value Date    WBC 6 69 10/27/2021    HGB 14 1 10/27/2021    HCT 44 0 10/27/2021     (H) 10/27/2021     10/27/2021     Lab Results   Component Value Date     11/20/2014    SODIUM 137 10/27/2021    K 3 9 10/27/2021     10/27/2021    CO2 28 10/27/2021    ANIONGAP 8 11/20/2014    AGAP 3 (L) 10/27/2021    BUN 15 10/27/2021    CREATININE 1 01 10/27/2021    GLUC 113 03/31/2016    GLUF 99 10/27/2021    CALCIUM 9 1 10/27/2021    AST 22 10/27/2021    ALT 40 10/27/2021    ALKPHOS 54 10/27/2021    PROT 7 0 11/20/2014    TP 7 0 10/27/2021    BILITOT 0 5 11/20/2014    TBILI 0 74 10/27/2021    EGFR 73 10/27/2021     Lab Results   Component Value Date    HGBA1C 5 4 05/22/2020     No results found for: CKTOTAL, CKMB, CKMBINDEX, TROPONINI    Imaging Studies:     Echocardiogram: 12/2/21      Left Ventricle: Left ventricular cavity size is normal  The left ventricular ejection fraction is 55%  Systolic function is normal  Wall motion is normal  Diastolic function is mildly abnormal, consistent with grade I (abnormal) relaxation  Wall thickness is mildly increased    Left Atrium: The atrium is mildly dilated    Aortic Valve: The aortic valve is trileaflet  The leaflets are severely thickened  The leaflets are severely calcified  There is severely reduced mobility  There is mild regurgitation  There is moderate to severe stenosis (peak velocity 3 5 m/sec, mean gradient 30 mm Hg, aortic valve area 1 0 cm2 by continuity equation)    Pulmonary Artery: The estimated pulmonary artery systolic pressure is 57 8 mmHg  The pulmonary artery systolic pressure is mildly increased        I have personally reviewed pertinent films in PACS     PCP and Cardiology notes reviewed     TAVR evaluation Assessment:     Soheila Citizen: III    5 Meter Walk: 5 sec, 4 sec, 5 sec    STS risk score (preliminary): 1 0%    KCCQ-12 completed    Assessment:  Patient Active Problem List    Diagnosis Date Noted    Laceration of right palm 11/07/2020    Nonrheumatic aortic valve stenosis 04/05/2019    Rectus diastasis 04/05/2019    Obesity (BMI 30 0-34 9) 04/05/2019    Prediabetes 03/05/2018    Cataract 90/17/8075    Umbilical hernia without obstruction and without gangrene 03/30/2016    Hyperlipidemia 11/20/2014     Severe aortic stenosis; Ongoing TAVR workup    Plan:    Amber Marin has symptomatic severe aortic stenosis  They will undergo the following testing for transcatheter aortic valve replacement: Gated CTA of the chest/abdomen/pelvis,  cardiac catheterization, dental clearance and carotid artery ultrasound  Once these studies have been completed, Shay Mcmanus will follow up in our office to review the results and to be evaluated to confirm the suitability of proceeding with transcatheter aortic valve replacment  Shay Mcmanus was comfortable with our recommendations, and their questions were answered to their satisfaction  The patient recently had a screening colonoscopy in 6/17/21  Therefore GI referral is not indicated at this time       SIGNATURE: CLAUDIO Myers  DATE: February 2, 2022  TIME: 10:43 AM

## 2022-02-02 NOTE — PROGRESS NOTES
Consultation - Cardiothoracic Surgery   Santiago Rothman 77 y o  male MRN: [id number]    Physician Requesting Consult: Dr Jamal Hunter    Reason for Consult / Principal Problem: Aortic stenosis, Non-Rheumatic    History of Present Illness: Santiago Rothman is a 77y o  year old male who presents for initial outpatient surgical consultation for symptomatic severe aortic stenosis  Patient's PMHx is notable for AS, HLD, Glaucoma, pre-Diabetes and obesity (BMI 30 27)  Patient's PCP detected a heart murmur in 2018 and obtained and echocardiogram which demonstrated moderate AS  He has been followed with serial echocardiograms since then  Patient was referred for cardiology consultation and seen by Dr Jamal Hunter in January 2021  Most recent echo in December 2021 demonstrates progression of known AS to severe range  Upon interview today patient reports exertional dyspnea and lightheadedness  He walks 3-4 miles per day and notices dyspnea with inclines  He states it requires "more effort" than usual  He reports "flashing" headaches with the lightheadedness  He denies chest pain, palpitaitons, pre-syncope or syncope, weight gain, edema, PND or orthopnea  Patient is a lifelong non-smoker  He drinks socially; a few beer per week with friends and an occasional glass of wine with his wife  Patient is a retired mailman   He lives with his wife and is independent with ADL's    Patient is up to date with dental care (seen 2 weeks ago)    Covid vaccinations: 3/9/21 & 3/30/21; booster 11/8/21        Past Medical History:  Past Medical History:   Diagnosis Date    Cataract     Heart murmur     Hyperlipidemia     Moderate aortic stenosis     Obesity     Prediabetes     Umbilical hernia          Past Surgical History:   Past Surgical History:   Procedure Laterality Date    ARTHROSCOPY KNEE Right     BUNIONECTOMY Right     HEMORRHOID SURGERY      SHOULDER SURGERY  2012    TONSILLECTOMY      childhood         Family History:  Family History   Problem Relation Age of Onset    Colon cancer Mother     Other Father         heart problem    Parkinsonism Family          Social History:    Social History     Substance and Sexual Activity   Alcohol Use Yes    Alcohol/week: 2 0 standard drinks    Types: 2 Cans of beer per week    Comment: twice a wk      Social History     Substance and Sexual Activity   Drug Use No     Social History     Tobacco Use   Smoking Status Never Smoker   Smokeless Tobacco Never Used         Home Medications:   Prior to Admission medications    Medication Sig Start Date End Date Taking?  Authorizing Provider   Lumigan 0 01 % ophthalmic drops  11/3/21  Yes Historical Provider, MD   Multiple Vitamin (MULTI-VITAMIN DAILY PO) Take by mouth 11/19/14  Yes Historical Provider, MD   Omega-3 Fatty Acids (fish oil) 1,000 mg Take 1,000 mg by mouth daily   Yes Historical Provider, MD   simvastatin (ZOCOR) 10 mg tablet Take 1 tablet by mouth once daily 1/26/22  Yes Chet Delarosa MD       Allergies:  No Known Allergies    Review of Systems:  Review of Systems - History obtained from chart review and the patient  General ROS: positive for  - fatigue and change in activity tolerance  negative for - chills, fatigue, sleep disturbance or weight gain  Psychological ROS: negative  Ophthalmic ROS: positive for - uses glasses  ENT ROS: negative  Allergy and Immunology ROS: negative  Hematological and Lymphatic ROS: negative for - bleeding problems, blood clots, bruising or jaundice  Endocrine ROS: negative  Breast ROS: negative  Respiratory ROS: no cough, shortness of breath, or wheezing  Cardiovascular ROS: positive for - dyspnea on exertion and murmur  negative for - chest pain, edema, irregular heartbeat, loss of consciousness, orthopnea, palpitations, paroxysmal nocturnal dyspnea or rapid heart rate  Gastrointestinal ROS: no abdominal pain, change in bowel habits, or black or bloody stools  Genito-Urinary ROS: no dysuria, trouble voiding, or hematuria  Musculoskeletal ROS: negative  Neurological ROS: positive for - lightheadedness  negative for - impaired coordination/balance, memory loss, numbness/tingling, seizures, speech problems, tremors, visual changes or weakness  Dermatological ROS: negative    Vital Signs:     Vitals:    02/02/22 1002 02/02/22 1006   BP: 160/76 152/69   BP Location: Left arm Right arm   Patient Position: Sitting    Cuff Size: Standard    Pulse: 67    Resp: 18    Temp: (!) 97 3 °F (36 3 °C)    TempSrc: Tympanic    SpO2: 99%    Weight: 93 kg (205 lb)    Height: 5' 9" (1 753 m)        Physical Exam:    General: Alert, oriented, well developed, no acute distress  HEENT/NECK:  PERRLA  No jugular venous distention  Cardiac:Regular rate and rhythm, III/VI harsh systolic murmur RUSB   Carotid arteries: 1+ pulses, bruits vs radiation of cardiac murmur to neck  Pulmonary:  Breath sounds clear bilaterally  Abdomen:  Non-tender, Non-distended  Positive bowel sounds  Upper extremities: 2+ radial pulses; brisk capillary refill  Lower extremities: Extremities warm/dry  PT/DP pulses 2+ bilaterally  No edema B/L  Neuro: Alert and oriented X 3  Sensation is grossly intact  No focal deficits  Musculoskeletal: MAEE, stable gait  Skin: Warm/Dry, without rashes or lesions        Lab Results:   Lab Results   Component Value Date    WBC 6 69 10/27/2021    HGB 14 1 10/27/2021    HCT 44 0 10/27/2021     (H) 10/27/2021     10/27/2021     Lab Results   Component Value Date     11/20/2014    SODIUM 137 10/27/2021    K 3 9 10/27/2021     10/27/2021    CO2 28 10/27/2021    ANIONGAP 8 11/20/2014    AGAP 3 (L) 10/27/2021    BUN 15 10/27/2021    CREATININE 1 01 10/27/2021    GLUC 113 03/31/2016    GLUF 99 10/27/2021    CALCIUM 9 1 10/27/2021    AST 22 10/27/2021    ALT 40 10/27/2021    ALKPHOS 54 10/27/2021    PROT 7 0 11/20/2014    TP 7 0 10/27/2021    BILITOT 0 5 11/20/2014    TBILI 0 74 10/27/2021    EGFR 73 10/27/2021     Lab Results   Component Value Date    HGBA1C 5 4 05/22/2020     No results found for: CKTOTAL, CKMB, CKMBINDEX, TROPONINI    Imaging Studies:     Echocardiogram: 12/2/21      Left Ventricle: Left ventricular cavity size is normal  The left ventricular ejection fraction is 55%  Systolic function is normal  Wall motion is normal  Diastolic function is mildly abnormal, consistent with grade I (abnormal) relaxation  Wall thickness is mildly increased    Left Atrium: The atrium is mildly dilated    Aortic Valve: The aortic valve is trileaflet  The leaflets are severely thickened  The leaflets are severely calcified  There is severely reduced mobility  There is mild regurgitation  There is moderate to severe stenosis (peak velocity 3 5 m/sec, mean gradient 30 mm Hg, aortic valve area 1 0 cm2 by continuity equation)    Pulmonary Artery: The estimated pulmonary artery systolic pressure is 32 5 mmHg  The pulmonary artery systolic pressure is mildly increased        I have personally reviewed pertinent films in PACS     PCP and Cardiology notes reviewed     TAVR evaluation Assessment:     Jet Dardenans: III    5 Meter Walk: 5 sec, 4 sec, 5 sec    STS risk score (preliminary): 1 0%    KCCQ-12 completed    Assessment:  Patient Active Problem List    Diagnosis Date Noted    Laceration of right palm 11/07/2020    Nonrheumatic aortic valve stenosis 04/05/2019    Rectus diastasis 04/05/2019    Obesity (BMI 30 0-34 9) 04/05/2019    Prediabetes 03/05/2018    Cataract 63/93/6107    Umbilical hernia without obstruction and without gangrene 03/30/2016    Hyperlipidemia 11/20/2014     Severe aortic stenosis; Ongoing TAVR workup    Plan:    Randal Hennessy has symptomatic severe aortic stenosis  They will undergo the following testing for transcatheter aortic valve replacement: Gated CTA of the chest/abdomen/pelvis,  cardiac catheterization, dental clearance and carotid artery ultrasound  Once these studies have been completed, Kami Alberto will follow up in our office to review the results and to be evaluated to confirm the suitability of proceeding with transcatheter aortic valve replacment  Kami Alberto was comfortable with our recommendations, and their questions were answered to their satisfaction  The patient recently had a screening colonoscopy in 6/17/21  Therefore GI referral is not indicated at this time       SIGNATURE: CLAUDIO Ruiz  DATE: February 2, 2022  TIME: 10:43 AM

## 2022-02-04 ENCOUNTER — PREP FOR PROCEDURE (OUTPATIENT)
Dept: CARDIOLOGY CLINIC | Facility: CLINIC | Age: 77
End: 2022-02-04

## 2022-02-04 ENCOUNTER — TELEPHONE (OUTPATIENT)
Dept: CARDIOLOGY CLINIC | Facility: CLINIC | Age: 77
End: 2022-02-04

## 2022-02-04 DIAGNOSIS — I06.0 RHEUMATIC AORTIC STENOSIS: Primary | ICD-10-CM

## 2022-02-04 NOTE — TELEPHONE ENCOUNTER
----- Message from Deion Lind sent at 2/4/2022  4:07 PM EST -----  Regarding: FW: Cath    ----- Message -----  From: Alma Delia Sharma  Sent: 2/2/2022  11:22 AM EST  To: Steve Ruiz RN, #  Subject: Cath                                             Please schedule patient for:     Pre TAVR Cardiac Cath: to be done in the next few weeks  Pt has medicare as primary and was given bloodwork to get done  To be done at: [facility]     To be done by:     Please address any questions regarding this request to Conrado Donald or Don Cummings,     Thank you

## 2022-02-04 NOTE — TELEPHONE ENCOUNTER
Patient scheduled for R+LHC on 2/14/22 at [facility] with Dr Perez      Instructions sent to patient through [address]   Yes     Medication hold: None

## 2022-02-07 ENCOUNTER — HOSPITAL ENCOUNTER (OUTPATIENT)
Dept: NON INVASIVE DIAGNOSTICS | Facility: HOSPITAL | Age: 77
Discharge: HOME | End: 2022-02-07
Payer: MEDICARE

## 2022-02-07 ENCOUNTER — HOSPITAL ENCOUNTER (OUTPATIENT)
Dept: RADIOLOGY | Facility: HOSPITAL | Age: 77
Discharge: HOME | End: 2022-02-07
Payer: MEDICARE

## 2022-02-07 DIAGNOSIS — Z01.818 PRE-OP TESTING: ICD-10-CM

## 2022-02-07 DIAGNOSIS — Z13.6 ENCOUNTER FOR SPECIAL SCREENING EXAMINATION FOR CARDIOVASCULAR DISORDER: ICD-10-CM

## 2022-02-07 DIAGNOSIS — I35.0 NONRHEUMATIC AORTIC VALVE STENOSIS: ICD-10-CM

## 2022-02-07 DIAGNOSIS — Z13.6 ENCOUNTER FOR SCREENING FOR STENOSIS OF CAROTID ARTERY: ICD-10-CM

## 2022-02-07 DIAGNOSIS — R06.00 DOE (DYSPNEA ON EXERTION): ICD-10-CM

## 2022-02-07 PROCEDURE — 93880 EXTRACRANIAL BILAT STUDY: CPT

## 2022-02-07 PROCEDURE — 75572 CT HRT W/3D IMAGE: CPT

## 2022-02-07 PROCEDURE — G1004 CDSM NDSC: HCPCS

## 2022-02-07 PROCEDURE — 93880 EXTRACRANIAL BILAT STUDY: CPT | Performed by: RADIOLOGY

## 2022-02-07 PROCEDURE — 74174 CTA ABD&PLVS W/CONTRAST: CPT

## 2022-02-07 RX ADMIN — IODIXANOL 120 ML: 320 INJECTION, SOLUTION INTRAVASCULAR at 11:32

## 2022-02-11 ENCOUNTER — LAB (OUTPATIENT)
Dept: LAB | Facility: HOSPITAL | Age: 77
End: 2022-02-11
Attending: INTERNAL MEDICINE
Payer: MEDICARE

## 2022-02-11 DIAGNOSIS — I06.0 RHEUMATIC AORTIC STENOSIS: ICD-10-CM

## 2022-02-11 LAB
BASOPHILS # BLD AUTO: 0.04 THOUSANDS/ΜL (ref 0–0.1)
BASOPHILS NFR BLD AUTO: 1 % (ref 0–1)
EOSINOPHIL # BLD AUTO: 0.07 THOUSAND/ΜL (ref 0–0.61)
EOSINOPHIL NFR BLD AUTO: 1 % (ref 0–6)
ERYTHROCYTE [DISTWIDTH] IN BLOOD BY AUTOMATED COUNT: 11.5 % (ref 11.6–15.1)
HCT VFR BLD AUTO: 43.1 % (ref 36.5–49.3)
HGB BLD-MCNC: 13.6 G/DL (ref 12–17)
IMM GRANULOCYTES # BLD AUTO: 0.06 THOUSAND/UL (ref 0–0.2)
IMM GRANULOCYTES NFR BLD AUTO: 1 % (ref 0–2)
LYMPHOCYTES # BLD AUTO: 1.56 THOUSANDS/ΜL (ref 0.6–4.47)
LYMPHOCYTES NFR BLD AUTO: 20 % (ref 14–44)
MCH RBC QN AUTO: 32 PG (ref 26.8–34.3)
MCHC RBC AUTO-ENTMCNC: 31.6 G/DL (ref 31.4–37.4)
MCV RBC AUTO: 101 FL (ref 82–98)
MONOCYTES # BLD AUTO: 0.67 THOUSAND/ΜL (ref 0.17–1.22)
MONOCYTES NFR BLD AUTO: 9 % (ref 4–12)
NEUTROPHILS # BLD AUTO: 5.4 THOUSANDS/ΜL (ref 1.85–7.62)
NEUTS SEG NFR BLD AUTO: 68 % (ref 43–75)
NRBC BLD AUTO-RTO: 0 /100 WBCS
PLATELET # BLD AUTO: 235 THOUSANDS/UL (ref 149–390)
PMV BLD AUTO: 10.5 FL (ref 8.9–12.7)
RBC # BLD AUTO: 4.25 MILLION/UL (ref 3.88–5.62)
WBC # BLD AUTO: 7.8 THOUSAND/UL (ref 4.31–10.16)

## 2022-02-11 PROCEDURE — 36415 COLL VENOUS BLD VENIPUNCTURE: CPT

## 2022-02-11 PROCEDURE — 85025 COMPLETE CBC W/AUTO DIFF WBC: CPT

## 2022-02-11 NOTE — TELEPHONE ENCOUNTER
Called Robbie to tell him we still need the labs for CBC  He got frustrated at the fact he already had labs done (CMP) I adv him we still need CBC  He wasn't aware of it   He said he will go to the hospital near his place and get it done ASAP

## 2022-02-14 ENCOUNTER — HOSPITAL ENCOUNTER (OUTPATIENT)
Facility: HOSPITAL | Age: 77
Setting detail: OUTPATIENT SURGERY
Discharge: HOME | End: 2022-02-14
Attending: INTERNAL MEDICINE | Admitting: INTERNAL MEDICINE
Payer: MEDICARE

## 2022-02-14 VITALS
OXYGEN SATURATION: 96 % | TEMPERATURE: 97.6 F | HEIGHT: 69 IN | HEART RATE: 64 BPM | RESPIRATION RATE: 18 BRPM | DIASTOLIC BLOOD PRESSURE: 54 MMHG | WEIGHT: 200 LBS | SYSTOLIC BLOOD PRESSURE: 122 MMHG | BODY MASS INDEX: 29.62 KG/M2

## 2022-02-14 DIAGNOSIS — I06.0 RHEUMATIC AORTIC STENOSIS: ICD-10-CM

## 2022-02-14 LAB
ATRIAL RATE: 59 BPM
ERYTHROCYTE [DISTWIDTH] IN BLOOD BY AUTOMATED COUNT: 11.8 % (ref 11.6–15.1)
HCT VFR BLD AUTO: 44.5 % (ref 36.5–49.3)
HGB BLD-MCNC: 14.6 G/DL (ref 12–17)
MCH RBC QN AUTO: 33.3 PG (ref 26.8–34.3)
MCHC RBC AUTO-ENTMCNC: 32.8 G/DL (ref 31.4–37.4)
MCV RBC AUTO: 102 FL (ref 82–98)
P AXIS: -24 DEGREES
PLATELET # BLD AUTO: 224 THOUSANDS/UL (ref 149–390)
PMV BLD AUTO: 9.9 FL (ref 8.9–12.7)
PR INTERVAL: 164 MS
QRS AXIS: 2 DEGREES
QRSD INTERVAL: 90 MS
QT INTERVAL: 396 MS
QTC INTERVAL: 392 MS
RBC # BLD AUTO: 4.38 MILLION/UL (ref 3.88–5.62)
T WAVE AXIS: -10 DEGREES
VENTRICULAR RATE: 59 BPM
WBC # BLD AUTO: 6.48 THOUSAND/UL (ref 4.31–10.16)

## 2022-02-14 PROCEDURE — 85027 COMPLETE CBC AUTOMATED: CPT | Performed by: INTERNAL MEDICINE

## 2022-02-14 PROCEDURE — C1769 GUIDE WIRE: HCPCS | Performed by: INTERNAL MEDICINE

## 2022-02-14 PROCEDURE — C1894 INTRO/SHEATH, NON-LASER: HCPCS | Performed by: INTERNAL MEDICINE

## 2022-02-14 PROCEDURE — 93454 CORONARY ARTERY ANGIO S&I: CPT | Performed by: INTERNAL MEDICINE

## 2022-02-14 PROCEDURE — 99152 MOD SED SAME PHYS/QHP 5/>YRS: CPT | Performed by: INTERNAL MEDICINE

## 2022-02-14 PROCEDURE — 93005 ELECTROCARDIOGRAM TRACING: CPT

## 2022-02-14 PROCEDURE — 99153 MOD SED SAME PHYS/QHP EA: CPT | Performed by: INTERNAL MEDICINE

## 2022-02-14 PROCEDURE — 93010 ELECTROCARDIOGRAM REPORT: CPT | Performed by: INTERNAL MEDICINE

## 2022-02-14 RX ORDER — VERAPAMIL HCL 2.5 MG/ML
AMPUL (ML) INTRAVENOUS AS NEEDED
Status: DISCONTINUED | OUTPATIENT
Start: 2022-02-14 | End: 2022-02-14 | Stop reason: HOSPADM

## 2022-02-14 RX ORDER — SODIUM CHLORIDE 9 MG/ML
125 INJECTION, SOLUTION INTRAVENOUS CONTINUOUS
Status: DISCONTINUED | OUTPATIENT
Start: 2022-02-14 | End: 2022-02-14 | Stop reason: HOSPADM

## 2022-02-14 RX ORDER — ACETAMINOPHEN 325 MG/1
650 TABLET ORAL EVERY 4 HOURS PRN
Status: DISCONTINUED | OUTPATIENT
Start: 2022-02-14 | End: 2022-02-14 | Stop reason: HOSPADM

## 2022-02-14 RX ORDER — ASPIRIN 81 MG/1
324 TABLET, CHEWABLE ORAL ONCE
Status: COMPLETED | OUTPATIENT
Start: 2022-02-14 | End: 2022-02-14

## 2022-02-14 RX ORDER — ONDANSETRON 2 MG/ML
4 INJECTION INTRAMUSCULAR; INTRAVENOUS EVERY 6 HOURS PRN
Status: DISCONTINUED | OUTPATIENT
Start: 2022-02-14 | End: 2022-02-14 | Stop reason: HOSPADM

## 2022-02-14 RX ORDER — FENTANYL CITRATE 50 UG/ML
INJECTION, SOLUTION INTRAMUSCULAR; INTRAVENOUS AS NEEDED
Status: DISCONTINUED | OUTPATIENT
Start: 2022-02-14 | End: 2022-02-14 | Stop reason: HOSPADM

## 2022-02-14 RX ORDER — NITROGLYCERIN 20 MG/100ML
INJECTION INTRAVENOUS AS NEEDED
Status: DISCONTINUED | OUTPATIENT
Start: 2022-02-14 | End: 2022-02-14 | Stop reason: HOSPADM

## 2022-02-14 RX ORDER — HEPARIN SODIUM 1000 [USP'U]/ML
INJECTION, SOLUTION INTRAVENOUS; SUBCUTANEOUS AS NEEDED
Status: DISCONTINUED | OUTPATIENT
Start: 2022-02-14 | End: 2022-02-14 | Stop reason: HOSPADM

## 2022-02-14 RX ORDER — LIDOCAINE HYDROCHLORIDE 10 MG/ML
INJECTION, SOLUTION EPIDURAL; INFILTRATION; INTRACAUDAL; PERINEURAL AS NEEDED
Status: DISCONTINUED | OUTPATIENT
Start: 2022-02-14 | End: 2022-02-14 | Stop reason: HOSPADM

## 2022-02-14 RX ORDER — NITROGLYCERIN 0.4 MG/1
0.4 TABLET SUBLINGUAL
Status: DISCONTINUED | OUTPATIENT
Start: 2022-02-14 | End: 2022-02-14 | Stop reason: HOSPADM

## 2022-02-14 RX ORDER — MIDAZOLAM HYDROCHLORIDE 2 MG/2ML
INJECTION, SOLUTION INTRAMUSCULAR; INTRAVENOUS AS NEEDED
Status: DISCONTINUED | OUTPATIENT
Start: 2022-02-14 | End: 2022-02-14 | Stop reason: HOSPADM

## 2022-02-14 RX ORDER — ASPIRIN 81 MG/1
81 TABLET, CHEWABLE ORAL DAILY
Status: DISCONTINUED | OUTPATIENT
Start: 2022-02-15 | End: 2022-02-14 | Stop reason: HOSPADM

## 2022-02-14 RX ORDER — SODIUM CHLORIDE 9 MG/ML
125 INJECTION, SOLUTION INTRAVENOUS CONTINUOUS
Status: DISCONTINUED | OUTPATIENT
Start: 2022-02-14 | End: 2022-02-14

## 2022-02-14 RX ADMIN — SODIUM CHLORIDE 125 ML/HR: 0.9 INJECTION, SOLUTION INTRAVENOUS at 08:02

## 2022-02-14 RX ADMIN — ASPIRIN 324 MG: 81 TABLET, CHEWABLE ORAL at 08:00

## 2022-02-14 NOTE — INTERVAL H&P NOTE
Patient re-evaluated  Accept as history and physical     Mr Elizabeth Bahena is here for [facility] as part of pre-TAVR management      Annemarie Harris, DO/February 14, 2022/8:01 AM

## 2022-02-14 NOTE — DISCHARGE INSTRUCTIONS
1  Please see the post cardiac catheterization dishcarge instructions  No heavy lifting, greater than 10 lbs  or strenuous  activity for 48 hrs  2 Remove band aid tomorrow  Shower and wash area- wrist gently with soap and water- beginning tomorrow  Rinse and pat dry  Apply new water seal band aid  Repeat this process for 5 days  No powders, creams lotions or antibiotic ointments  for 5 days  No tub baths, hot tubs or swimming for 5 days  3  Please call our office ([phone]) if you have any fever, redness, swelling, discharge from your wrist access site  4 No driving for 1 day    Left Heart Catheterization   WHAT YOU NEED TO KNOW:   A left heart catheterization is a procedure to look at your heart and its arteries  You may need this procedure if you have chest pain, heart disease, or your heart is not working as it should  DISCHARGE INSTRUCTIONS:   Call 911 for any of the following:   · You have any of the following signs of a heart attack:      ? Squeezing, pressure, or pain in your chest     ? and  any of the following:     ? Discomfort or pain in your back, neck, jaw, stomach, or arm     ? Shortness of breath    ? Nausea or vomiting    ? Lightheadedness or a sudden cold sweat    · You have any of the following signs of a stroke:      ? Numbness or drooping on one side of your face     ? Weakness in an arm or leg    ? Confusion or difficulty speaking    ? Dizziness, a severe headache, or vision loss    Seek care immediately if:   · Your arm or leg feels warm, tender, and painful  It may look swollen and red  · The leg or arm used for your angiogram is numb, painful, or changes color  · The bruise at your catheter site gets bigger or becomes swollen  · Your wound does not stop bleeding even after you apply firm pressure for 15 minutes  · You have weakness in an arm or leg  · You become confused or have difficulty speaking      · You have dizziness, a severe headache, or vision loss     Contact your healthcare provider if:   · You have a fever  · Your catheter site is red, leaks pus, or smells bad  · You have increasing pain at your catheter site  · You have questions or concerns about your condition or care  Limit activity as directed:   · Avoid unnecessary stair climbing for 48 hours, if a catheter was put in your groin  · Do not place pressure on your arm, hand, or wrist, if the catheter was placed in your wrist  Avoid pushing, pulling, or heavy lifting with that arm  · If you need to cough, support the area where the catheter was inserted with your hand  · Ask your healthcare provider how long you need to limit movement and avoid certain activities  · You may feel like resting more after your procedure  Slowly start to do more each day  Rest when you feel it is needed  Keep your bandage clean and dry:  Ask your healthcare provider when you can bathe and shower  Do not take baths or go in pools or hot tubs  Check your site every day for signs of infection such as swelling, redness, or pus  Drink liquids as directed:  Liquids help flush the dye used for your procedure out of your body  Ask your healthcare provider how much liquid to drink each day, and which liquids to drink  Some foods, such as soup and fruit, also provide liquid  Limit alcohol:  Do not drink alcohol for 24 hours after your procedure  Then limit alcohol  Women should limit alcohol to 1 drink a day  Men should limit alcohol to 2 drinks a day  A drink of alcohol is 12 ounces of beer, 5 ounces of wine, or 1½ ounces of liquor  Do not smoke:  Nicotine and other chemicals in cigarettes and cigars can damage your blood vessels  Ask your healthcare provider for information if you currently smoke and need help to quit  E-cigarettes or smokeless tobacco still contain nicotine  Talk to your healthcare provider before you use these products     Follow up with your healthcare provider as directed: Write down your questions so you remember to ask them during your visits  © Copyright [employer]  Information is for End User's use only and may not be sold, redistributed or otherwise used for commercial purposes  All illustrations and images included in CareNotes® are the copyrighted property of A D A M , Inc  or Elmer Hoffman  The above information is an  only  It is not intended as medical advice for individual conditions or treatments  Talk to your doctor, nurse or pharmacist before following any medical regimen to see if it is safe and effective for you  Patient Discharge    Cinthia Melendrez / [id number] : 1945    Admitted 2022 Discharged: 2022       Scheduled Meds:  Current Facility-Administered Medications   Medication Dose Route Frequency Provider Last Rate    acetaminophen  650 mg Oral Q4H PRN Zoanne Dilworth, DO      [START ON 2/15/2022] aspirin  81 mg Oral Daily Zoanne Dilworth, DO      nitroglycerin  0 4 mg Sublingual Q5 Min PRN Zoanne Dilworth, DO      ondansetron  4 mg Intravenous Q6H PRN Zoanne Dilworth, DO      sodium chloride  125 mL/hr Intravenous Continuous Zoanne Dilworth, DO       Continuous Infusions:sodium chloride, 125 mL/hr      PRN Meds:  acetaminophen    nitroglycerin    ondansetron  Personal Items    Please collect all clothing which belongs to you from your nurse  Please collect any valuables you stored during your stay from the , and please remember all of your personal items, such as dentures, canes, and eyeglasses  Activity Instructions    You must avoid lifting more than *** pounds until your physician instructs you differently  You should avoid {d/c avoid/resume:815786}  You may resume {d/c avoid/resume:712002}  I understand that if any problems occur once I am at home I am to contact my physician  I understand and acknowledge receipt of the instructions indicated above  _____________________________________________                                                        [email or web address]'S or R N 's Signature                Date/Time                          _____________________________________________                                                        Patient or Representative Signature         Date/Time

## 2022-02-15 DIAGNOSIS — I35.0 NONRHEUMATIC AORTIC VALVE STENOSIS: Primary | ICD-10-CM

## 2022-02-15 DIAGNOSIS — Z01.812 ENCOUNTER FOR PRE-OPERATIVE LABORATORY TESTING: ICD-10-CM

## 2022-02-16 ENCOUNTER — LAB (OUTPATIENT)
Dept: LAB | Facility: HOSPITAL | Age: 77
End: 2022-02-16
Payer: MEDICARE

## 2022-02-16 ENCOUNTER — LAB REQUISITION (OUTPATIENT)
Dept: LAB | Facility: HOSPITAL | Age: 77
End: 2022-02-16
Payer: MEDICARE

## 2022-02-16 DIAGNOSIS — I35.0 NONRHEUMATIC AORTIC VALVE STENOSIS: ICD-10-CM

## 2022-02-16 DIAGNOSIS — Z01.818 ENCOUNTER FOR OTHER PREPROCEDURAL EXAMINATION: ICD-10-CM

## 2022-02-16 DIAGNOSIS — Z01.812 ENCOUNTER FOR PRE-OPERATIVE LABORATORY TESTING: ICD-10-CM

## 2022-02-16 DIAGNOSIS — Z01.818 OTHER SPECIFIED PRE-OPERATIVE EXAMINATION: ICD-10-CM

## 2022-02-16 LAB
ABO GROUP BLD: NORMAL
BLD GP AB SCN SERPL QL: NEGATIVE
INR PPP: 0.93 (ref 0.84–1.19)
PROTHROMBIN TIME: 12.1 SECONDS (ref 11.6–14.5)
RH BLD: POSITIVE
SPECIMEN EXPIRATION DATE: NORMAL

## 2022-02-16 PROCEDURE — 86901 BLOOD TYPING SEROLOGIC RH(D): CPT | Performed by: NURSE PRACTITIONER

## 2022-02-16 PROCEDURE — 86850 RBC ANTIBODY SCREEN: CPT | Performed by: NURSE PRACTITIONER

## 2022-02-16 PROCEDURE — 36415 COLL VENOUS BLD VENIPUNCTURE: CPT

## 2022-02-16 PROCEDURE — 85610 PROTHROMBIN TIME: CPT

## 2022-02-16 PROCEDURE — 86900 BLOOD TYPING SEROLOGIC ABO: CPT | Performed by: NURSE PRACTITIONER

## 2022-02-21 ENCOUNTER — ANESTHESIA EVENT (INPATIENT)
Dept: PERIOP | Facility: HOSPITAL | Age: 77
DRG: 267 | End: 2022-02-21
Payer: MEDICARE

## 2022-02-21 RX ORDER — HEPARIN SODIUM 1000 [USP'U]/ML
400 INJECTION, SOLUTION INTRAVENOUS; SUBCUTANEOUS ONCE
Status: CANCELLED | OUTPATIENT
Start: 2022-02-22

## 2022-02-21 NOTE — ANESTHESIA PREPROCEDURE EVALUATION
Procedure:  REPLACEMENT AORTIC VALVE TRANSCATHETER (TAVR) TRANSFEMORAL W/ 29MM SARKAR PAULO S3 ULTRA VALVE(ACCESS ON LEFT) (N/A Chest)  TRANSESOPHAGEAL ECHOCARDIOGRAM (ELISSA) (N/A Esophagus)  CARDIAC TAVR (N/A Chest)    Relevant Problems   CARDIO   (+) Hyperlipidemia   (+) Nonrheumatic aortic valve stenosis      MUSCULOSKELETAL   (+) Rectus diastasis     sev AS, CHF, HTN, HLD    LHC: munumal luminal irregularities    TTE: normal biventricular systolic function, DD1, mod-sev AS    Cr 1 0, hgb 14 6, plt 224         Anesthesia Plan  ASA Score- 4     Anesthesia Type- general with ASA Monitors  Additional Monitors: central venous line and arterial line  Airway Plan: ETT  Comment: General anesthesia, endotracheal intubation, standard ASA monitors; large bore IV access (possible Cordis); pre-induction arterial line; TLC CVL; ELISSA (no ELISSA contraindications noted); plan for post-op PACU, possiblve ICU/vent  Risks discussed - nausea, discomfort, sore throat, dental damage; rare anesthetic emergencies; patient understands, agrees to proceed          Plan Factors-    Chart reviewed  Imaging results reviewed  Existing labs reviewed  Induction- intravenous  Postoperative Plan- Plan for postoperative opioid use  Planned trial extubation    Informed Consent- Anesthetic plan and risks discussed with patient  I personally reviewed this patient with the CRNA  Discussed and agreed on the Anesthesia Plan with the CRNA  Harrison Pineda

## 2022-02-22 ENCOUNTER — APPOINTMENT (INPATIENT)
Dept: NON INVASIVE DIAGNOSTICS | Facility: HOSPITAL | Age: 77
DRG: 267 | End: 2022-02-22
Payer: MEDICARE

## 2022-02-22 ENCOUNTER — ANESTHESIA (INPATIENT)
Dept: PERIOP | Facility: HOSPITAL | Age: 77
DRG: 267 | End: 2022-02-22
Payer: MEDICARE

## 2022-02-22 ENCOUNTER — APPOINTMENT (INPATIENT)
Dept: RADIOLOGY | Facility: HOSPITAL | Age: 77
DRG: 267 | End: 2022-02-22
Payer: MEDICARE

## 2022-02-22 ENCOUNTER — HOSPITAL ENCOUNTER (INPATIENT)
Facility: HOSPITAL | Age: 77
LOS: 1 days | Discharge: HOME | DRG: 267 | End: 2022-02-23
Attending: THORACIC SURGERY (CARDIOTHORACIC VASCULAR SURGERY) | Admitting: THORACIC SURGERY (CARDIOTHORACIC VASCULAR SURGERY)
Payer: MEDICARE

## 2022-02-22 DIAGNOSIS — I35.9 NONRHEUMATIC AORTIC VALVE DISORDER, UNSPECIFIED: ICD-10-CM

## 2022-02-22 DIAGNOSIS — Z95.2 S/P TAVR (TRANSCATHETER AORTIC VALVE REPLACEMENT): Primary | ICD-10-CM

## 2022-02-22 DIAGNOSIS — I35.0 NONRHEUMATIC AORTIC VALVE STENOSIS: ICD-10-CM

## 2022-02-22 PROBLEM — I25.10 CAD (CORONARY ARTERY DISEASE): Status: ACTIVE | Noted: 2022-02-22

## 2022-02-22 PROBLEM — E87.8 HYPERCHLOREMIA: Status: ACTIVE | Noted: 2022-02-22

## 2022-02-22 PROBLEM — I50.9 CHF (CONGESTIVE HEART FAILURE) (HCC): Status: ACTIVE | Noted: 2022-02-22

## 2022-02-22 LAB
ABO GROUP BLD: NORMAL
ANION GAP SERPL CALCULATED.3IONS-SCNC: 4 MMOL/L (ref 4–13)
ATRIAL RATE: 77 BPM
BASE EXCESS BLDA CALC-SCNC: -2 MMOL/L (ref -2–3)
BASE EXCESS BLDA CALC-SCNC: -3 MMOL/L (ref -2–3)
BASE EXCESS BLDA CALC-SCNC: -3 MMOL/L (ref -2–3)
BUN SERPL-MCNC: 19 MG/DL (ref 5–25)
CA-I BLD-SCNC: 1.18 MMOL/L (ref 1.12–1.32)
CA-I BLD-SCNC: 1.23 MMOL/L (ref 1.12–1.32)
CA-I BLD-SCNC: 1.39 MMOL/L (ref 1.12–1.32)
CALCIUM SERPL-MCNC: 9.3 MG/DL (ref 8.3–10.1)
CHLORIDE SERPL-SCNC: 113 MMOL/L (ref 100–108)
CO2 SERPL-SCNC: 23 MMOL/L (ref 21–32)
CREAT SERPL-MCNC: 0.93 MG/DL (ref 0.6–1.3)
GFR SERPL CREATININE-BSD FRML MDRD: 80 ML/MIN/1.73SQ M
GLUCOSE SERPL-MCNC: 102 MG/DL (ref 65–140)
GLUCOSE SERPL-MCNC: 111 MG/DL (ref 65–140)
GLUCOSE SERPL-MCNC: 117 MG/DL (ref 65–140)
GLUCOSE SERPL-MCNC: 118 MG/DL (ref 65–140)
GLUCOSE SERPL-MCNC: 120 MG/DL (ref 65–140)
GLUCOSE SERPL-MCNC: 128 MG/DL (ref 65–140)
GLUCOSE SERPL-MCNC: 146 MG/DL (ref 65–140)
GLUCOSE SERPL-MCNC: 99 MG/DL (ref 65–140)
HCO3 BLDA-SCNC: 22.7 MMOL/L (ref 22–28)
HCO3 BLDA-SCNC: 23.1 MMOL/L (ref 22–28)
HCO3 BLDA-SCNC: 23.1 MMOL/L (ref 22–28)
HCT VFR BLD AUTO: 39.5 % (ref 36.5–49.3)
HCT VFR BLD CALC: 30 % (ref 36.5–49.3)
HCT VFR BLD CALC: 31 % (ref 36.5–49.3)
HCT VFR BLD CALC: 33 % (ref 36.5–49.3)
HGB BLD-MCNC: 12.8 G/DL (ref 12–17)
HGB BLDA-MCNC: 10.2 G/DL (ref 12–17)
HGB BLDA-MCNC: 10.5 G/DL (ref 12–17)
HGB BLDA-MCNC: 11.2 G/DL (ref 12–17)
KCT BLD-ACNC: 126 SEC (ref 89–137)
KCT BLD-ACNC: 138 SEC (ref 89–137)
KCT BLD-ACNC: 225 SEC (ref 89–137)
P AXIS: 56 DEGREES
PCO2 BLD: 24 MMOL/L (ref 21–32)
PCO2 BLD: 39.3 MM HG (ref 36–44)
PCO2 BLD: 42.1 MM HG (ref 36–44)
PCO2 BLD: 45.2 MM HG (ref 36–44)
PH BLD: 7.32 [PH] (ref 7.35–7.45)
PH BLD: 7.35 [PH] (ref 7.35–7.45)
PH BLD: 7.37 [PH] (ref 7.35–7.45)
PLATELET # BLD AUTO: 199 THOUSANDS/UL (ref 149–390)
PMV BLD AUTO: 9.9 FL (ref 8.9–12.7)
PO2 BLD: 166 MM HG (ref 75–129)
PO2 BLD: 169 MM HG (ref 75–129)
PO2 BLD: 307 MM HG (ref 75–129)
POTASSIUM BLD-SCNC: 3.7 MMOL/L (ref 3.5–5.3)
POTASSIUM BLD-SCNC: 3.8 MMOL/L (ref 3.5–5.3)
POTASSIUM BLD-SCNC: 3.9 MMOL/L (ref 3.5–5.3)
POTASSIUM SERPL-SCNC: 4.5 MMOL/L (ref 3.5–5.3)
PR INTERVAL: 171 MS
QRS AXIS: 50 DEGREES
QRSD INTERVAL: 92 MS
QT INTERVAL: 379 MS
QTC INTERVAL: 429 MS
RH BLD: POSITIVE
SAO2 % BLD FROM PO2: 100 % (ref 60–85)
SAO2 % BLD FROM PO2: 99 % (ref 60–85)
SAO2 % BLD FROM PO2: 99 % (ref 60–85)
SODIUM BLD-SCNC: 141 MMOL/L (ref 136–145)
SODIUM BLD-SCNC: 142 MMOL/L (ref 136–145)
SODIUM BLD-SCNC: 142 MMOL/L (ref 136–145)
SODIUM SERPL-SCNC: 140 MMOL/L (ref 136–145)
SPECIMEN SOURCE: ABNORMAL
SPECIMEN SOURCE: NORMAL
T WAVE AXIS: 33 DEGREES
VENTRICULAR RATE: 77 BPM

## 2022-02-22 PROCEDURE — 85347 COAGULATION TIME ACTIVATED: CPT

## 2022-02-22 PROCEDURE — 82803 BLOOD GASES ANY COMBINATION: CPT

## 2022-02-22 PROCEDURE — 71250 CT THORAX DX C-: CPT

## 2022-02-22 PROCEDURE — 94760 N-INVAS EAR/PLS OXIMETRY 1: CPT

## 2022-02-22 PROCEDURE — C1781 MESH (IMPLANTABLE): HCPCS | Performed by: THORACIC SURGERY (CARDIOTHORACIC VASCULAR SURGERY)

## 2022-02-22 PROCEDURE — 85049 AUTOMATED PLATELET COUNT: CPT | Performed by: PHYSICIAN ASSISTANT

## 2022-02-22 PROCEDURE — G9197 ORDER FOR CEPH: HCPCS | Performed by: INTERNAL MEDICINE

## 2022-02-22 PROCEDURE — 85014 HEMATOCRIT: CPT

## 2022-02-22 PROCEDURE — C1769 GUIDE WIRE: HCPCS | Performed by: THORACIC SURGERY (CARDIOTHORACIC VASCULAR SURGERY)

## 2022-02-22 PROCEDURE — C1894 INTRO/SHEATH, NON-LASER: HCPCS | Performed by: THORACIC SURGERY (CARDIOTHORACIC VASCULAR SURGERY)

## 2022-02-22 PROCEDURE — G1004 CDSM NDSC: HCPCS

## 2022-02-22 PROCEDURE — 93010 ELECTROCARDIOGRAM REPORT: CPT | Performed by: INTERNAL MEDICINE

## 2022-02-22 PROCEDURE — NC001 PR NO CHARGE: Performed by: PHYSICIAN ASSISTANT

## 2022-02-22 PROCEDURE — 93355 ECHO TRANSESOPHAGEAL (TEE): CPT

## 2022-02-22 PROCEDURE — 85018 HEMOGLOBIN: CPT | Performed by: PHYSICIAN ASSISTANT

## 2022-02-22 PROCEDURE — 82947 ASSAY GLUCOSE BLOOD QUANT: CPT

## 2022-02-22 PROCEDURE — C1751 CATH, INF, PER/CENT/MIDLINE: HCPCS | Performed by: THORACIC SURGERY (CARDIOTHORACIC VASCULAR SURGERY)

## 2022-02-22 PROCEDURE — 99222 1ST HOSP IP/OBS MODERATE 55: CPT | Performed by: INTERNAL MEDICINE

## 2022-02-22 PROCEDURE — 76377 3D RENDER W/INTRP POSTPROCES: CPT

## 2022-02-22 PROCEDURE — 02HV33Z INSERTION OF INFUSION DEVICE INTO SUPERIOR VENA CAVA, PERCUTANEOUS APPROACH: ICD-10-PCS | Performed by: ANESTHESIOLOGY

## 2022-02-22 PROCEDURE — C1760 CLOSURE DEV, VASC: HCPCS | Performed by: THORACIC SURGERY (CARDIOTHORACIC VASCULAR SURGERY)

## 2022-02-22 PROCEDURE — 02RF38Z REPLACEMENT OF AORTIC VALVE WITH ZOOPLASTIC TISSUE, PERCUTANEOUS APPROACH: ICD-10-PCS | Performed by: THORACIC SURGERY (CARDIOTHORACIC VASCULAR SURGERY)

## 2022-02-22 PROCEDURE — 86920 COMPATIBILITY TEST SPIN: CPT

## 2022-02-22 PROCEDURE — 33361 REPLACE AORTIC VALVE PERQ: CPT | Performed by: INTERNAL MEDICINE

## 2022-02-22 PROCEDURE — 85014 HEMATOCRIT: CPT | Performed by: PHYSICIAN ASSISTANT

## 2022-02-22 PROCEDURE — 84132 ASSAY OF SERUM POTASSIUM: CPT

## 2022-02-22 PROCEDURE — 93306 TTE W/DOPPLER COMPLETE: CPT

## 2022-02-22 PROCEDURE — G9197 ORDER FOR CEPH: HCPCS | Performed by: THORACIC SURGERY (CARDIOTHORACIC VASCULAR SURGERY)

## 2022-02-22 PROCEDURE — 80048 BASIC METABOLIC PNL TOTAL CA: CPT | Performed by: PHYSICIAN ASSISTANT

## 2022-02-22 PROCEDURE — 71045 X-RAY EXAM CHEST 1 VIEW: CPT

## 2022-02-22 PROCEDURE — 82330 ASSAY OF CALCIUM: CPT

## 2022-02-22 PROCEDURE — 93005 ELECTROCARDIOGRAM TRACING: CPT

## 2022-02-22 PROCEDURE — 84295 ASSAY OF SERUM SODIUM: CPT

## 2022-02-22 PROCEDURE — 33361 REPLACE AORTIC VALVE PERQ: CPT | Performed by: THORACIC SURGERY (CARDIOTHORACIC VASCULAR SURGERY)

## 2022-02-22 PROCEDURE — 82948 REAGENT STRIP/BLOOD GLUCOSE: CPT

## 2022-02-22 DEVICE — PERCLOSE™ PROSTYLE™ SUTURE-MEDIATED CLOSURE AND REPAIR SYSTEM
Type: IMPLANTABLE DEVICE | Site: GROIN | Status: FUNCTIONAL
Brand: PERCLOSE™ PROSTYLE™

## 2022-02-22 DEVICE — TAVR SAPIEN 3 CMNDR DLV SYS 29MM: Type: IMPLANTABLE DEVICE | Site: HEART | Status: FUNCTIONAL

## 2022-02-22 RX ORDER — CHLORHEXIDINE GLUCONATE 0.12 MG/ML
15 RINSE ORAL ONCE
Status: COMPLETED | OUTPATIENT
Start: 2022-02-22 | End: 2022-02-22

## 2022-02-22 RX ORDER — CHLORHEXIDINE GLUCONATE 0.12 MG/ML
15 RINSE ORAL 2 TIMES DAILY
Status: DISCONTINUED | OUTPATIENT
Start: 2022-02-22 | End: 2022-02-23 | Stop reason: HOSPADM

## 2022-02-22 RX ORDER — SODIUM CHLORIDE 9 MG/ML
INJECTION, SOLUTION INTRAVENOUS CONTINUOUS PRN
Status: DISCONTINUED | OUTPATIENT
Start: 2022-02-22 | End: 2022-02-22

## 2022-02-22 RX ORDER — ONDANSETRON 2 MG/ML
INJECTION INTRAMUSCULAR; INTRAVENOUS AS NEEDED
Status: DISCONTINUED | OUTPATIENT
Start: 2022-02-22 | End: 2022-02-22

## 2022-02-22 RX ORDER — LANOLIN ALCOHOL/MO/W.PET/CERES
3 CREAM (GRAM) TOPICAL
Status: DISCONTINUED | OUTPATIENT
Start: 2022-02-22 | End: 2022-02-23 | Stop reason: HOSPADM

## 2022-02-22 RX ORDER — ONDANSETRON 2 MG/ML
4 INJECTION INTRAMUSCULAR; INTRAVENOUS EVERY 6 HOURS PRN
Status: DISCONTINUED | OUTPATIENT
Start: 2022-02-22 | End: 2022-02-23 | Stop reason: HOSPADM

## 2022-02-22 RX ORDER — HEPARIN SODIUM 5000 [USP'U]/ML
5000 INJECTION, SOLUTION INTRAVENOUS; SUBCUTANEOUS EVERY 8 HOURS SCHEDULED
Status: DISCONTINUED | OUTPATIENT
Start: 2022-02-23 | End: 2022-02-23 | Stop reason: HOSPADM

## 2022-02-22 RX ORDER — POLYETHYLENE GLYCOL 3350 17 G/17G
17 POWDER, FOR SOLUTION ORAL DAILY
Status: DISCONTINUED | OUTPATIENT
Start: 2022-02-22 | End: 2022-02-23 | Stop reason: HOSPADM

## 2022-02-22 RX ORDER — ACETAMINOPHEN 325 MG/1
650 TABLET ORAL EVERY 4 HOURS PRN
Status: DISCONTINUED | OUTPATIENT
Start: 2022-02-22 | End: 2022-02-23 | Stop reason: HOSPADM

## 2022-02-22 RX ORDER — CEFAZOLIN SODIUM 1 G/3ML
INJECTION, POWDER, FOR SOLUTION INTRAMUSCULAR; INTRAVENOUS AS NEEDED
Status: DISCONTINUED | OUTPATIENT
Start: 2022-02-22 | End: 2022-02-22

## 2022-02-22 RX ORDER — CALCIUM CHLORIDE 100 MG/ML
INJECTION INTRAVENOUS; INTRAVENTRICULAR AS NEEDED
Status: DISCONTINUED | OUTPATIENT
Start: 2022-02-22 | End: 2022-02-22

## 2022-02-22 RX ORDER — LIDOCAINE HYDROCHLORIDE 10 MG/ML
INJECTION, SOLUTION EPIDURAL; INFILTRATION; INTRACAUDAL; PERINEURAL AS NEEDED
Status: DISCONTINUED | OUTPATIENT
Start: 2022-02-22 | End: 2022-02-22

## 2022-02-22 RX ORDER — NEOSTIGMINE METHYLSULFATE 1 MG/ML
INJECTION INTRAVENOUS AS NEEDED
Status: DISCONTINUED | OUTPATIENT
Start: 2022-02-22 | End: 2022-02-22

## 2022-02-22 RX ORDER — PRAVASTATIN SODIUM 20 MG
20 TABLET ORAL
Status: DISCONTINUED | OUTPATIENT
Start: 2022-02-22 | End: 2022-02-23 | Stop reason: HOSPADM

## 2022-02-22 RX ORDER — PROTAMINE SULFATE 10 MG/ML
INJECTION, SOLUTION INTRAVENOUS AS NEEDED
Status: DISCONTINUED | OUTPATIENT
Start: 2022-02-22 | End: 2022-02-22

## 2022-02-22 RX ORDER — CEFAZOLIN SODIUM 2 G/50ML
2000 SOLUTION INTRAVENOUS ONCE
Status: DISCONTINUED | OUTPATIENT
Start: 2022-02-22 | End: 2022-02-22 | Stop reason: HOSPADM

## 2022-02-22 RX ORDER — PROPOFOL 10 MG/ML
INJECTION, EMULSION INTRAVENOUS AS NEEDED
Status: DISCONTINUED | OUTPATIENT
Start: 2022-02-22 | End: 2022-02-22

## 2022-02-22 RX ORDER — BISACODYL 10 MG
10 SUPPOSITORY, RECTAL RECTAL DAILY PRN
Status: DISCONTINUED | OUTPATIENT
Start: 2022-02-22 | End: 2022-02-23 | Stop reason: HOSPADM

## 2022-02-22 RX ORDER — FENTANYL CITRATE/PF 50 MCG/ML
25 SYRINGE (ML) INJECTION
Status: DISCONTINUED | OUTPATIENT
Start: 2022-02-22 | End: 2022-02-22 | Stop reason: HOSPADM

## 2022-02-22 RX ORDER — HEPARIN SODIUM 1000 [USP'U]/ML
INJECTION, SOLUTION INTRAVENOUS; SUBCUTANEOUS AS NEEDED
Status: DISCONTINUED | OUTPATIENT
Start: 2022-02-22 | End: 2022-02-22

## 2022-02-22 RX ORDER — SODIUM CHLORIDE, SODIUM LACTATE, POTASSIUM CHLORIDE, CALCIUM CHLORIDE 600; 310; 30; 20 MG/100ML; MG/100ML; MG/100ML; MG/100ML
INJECTION, SOLUTION INTRAVENOUS CONTINUOUS PRN
Status: DISCONTINUED | OUTPATIENT
Start: 2022-02-22 | End: 2022-02-22

## 2022-02-22 RX ORDER — SODIUM CHLORIDE, SODIUM GLUCONATE, SODIUM ACETATE, POTASSIUM CHLORIDE, MAGNESIUM CHLORIDE, SODIUM PHOSPHATE, DIBASIC, AND POTASSIUM PHOSPHATE .53; .5; .37; .037; .03; .012; .00082 G/100ML; G/100ML; G/100ML; G/100ML; G/100ML; G/100ML; G/100ML
50 INJECTION, SOLUTION INTRAVENOUS CONTINUOUS
Status: DISPENSED | OUTPATIENT
Start: 2022-02-22 | End: 2022-02-22

## 2022-02-22 RX ORDER — DOCUSATE SODIUM 100 MG/1
100 CAPSULE, LIQUID FILLED ORAL 2 TIMES DAILY
Status: DISCONTINUED | OUTPATIENT
Start: 2022-02-22 | End: 2022-02-23 | Stop reason: HOSPADM

## 2022-02-22 RX ORDER — LIDOCAINE HYDROCHLORIDE 10 MG/ML
INJECTION, SOLUTION EPIDURAL; INFILTRATION; INTRACAUDAL; PERINEURAL
Status: COMPLETED | OUTPATIENT
Start: 2022-02-22 | End: 2022-02-22

## 2022-02-22 RX ORDER — ROCURONIUM BROMIDE 10 MG/ML
INJECTION, SOLUTION INTRAVENOUS AS NEEDED
Status: DISCONTINUED | OUTPATIENT
Start: 2022-02-22 | End: 2022-02-22

## 2022-02-22 RX ORDER — HYDROMORPHONE HCL IN WATER/PF 6 MG/30 ML
0.2 PATIENT CONTROLLED ANALGESIA SYRINGE INTRAVENOUS
Status: DISCONTINUED | OUTPATIENT
Start: 2022-02-22 | End: 2022-02-22 | Stop reason: HOSPADM

## 2022-02-22 RX ORDER — FENTANYL CITRATE 50 UG/ML
INJECTION, SOLUTION INTRAMUSCULAR; INTRAVENOUS AS NEEDED
Status: DISCONTINUED | OUTPATIENT
Start: 2022-02-22 | End: 2022-02-22

## 2022-02-22 RX ORDER — CLOPIDOGREL BISULFATE 75 MG/1
75 TABLET ORAL DAILY
Status: DISCONTINUED | OUTPATIENT
Start: 2022-02-22 | End: 2022-02-23 | Stop reason: HOSPADM

## 2022-02-22 RX ORDER — MIDAZOLAM HYDROCHLORIDE 2 MG/2ML
INJECTION, SOLUTION INTRAMUSCULAR; INTRAVENOUS AS NEEDED
Status: DISCONTINUED | OUTPATIENT
Start: 2022-02-22 | End: 2022-02-22

## 2022-02-22 RX ORDER — TORSEMIDE 10 MG/1
10 TABLET ORAL DAILY
Status: DISCONTINUED | OUTPATIENT
Start: 2022-02-22 | End: 2022-02-23 | Stop reason: HOSPADM

## 2022-02-22 RX ORDER — GLYCOPYRROLATE 0.2 MG/ML
INJECTION INTRAMUSCULAR; INTRAVENOUS AS NEEDED
Status: DISCONTINUED | OUTPATIENT
Start: 2022-02-22 | End: 2022-02-22

## 2022-02-22 RX ORDER — CEFAZOLIN SODIUM 2 G/50ML
2000 SOLUTION INTRAVENOUS EVERY 8 HOURS
Status: COMPLETED | OUTPATIENT
Start: 2022-02-22 | End: 2022-02-23

## 2022-02-22 RX ORDER — PANTOPRAZOLE SODIUM 40 MG/1
40 TABLET, DELAYED RELEASE ORAL
Status: DISCONTINUED | OUTPATIENT
Start: 2022-02-23 | End: 2022-02-23 | Stop reason: HOSPADM

## 2022-02-22 RX ORDER — ASPIRIN 81 MG/1
81 TABLET, CHEWABLE ORAL DAILY
Status: DISCONTINUED | OUTPATIENT
Start: 2022-02-22 | End: 2022-02-23 | Stop reason: HOSPADM

## 2022-02-22 RX ORDER — POTASSIUM CHLORIDE 750 MG/1
10 TABLET, EXTENDED RELEASE ORAL DAILY
Status: DISCONTINUED | OUTPATIENT
Start: 2022-02-22 | End: 2022-02-23 | Stop reason: HOSPADM

## 2022-02-22 RX ORDER — NICARDIPINE HYDROCHLORIDE 2.5 MG/ML
INJECTION INTRAVENOUS
Status: DISPENSED
Start: 2022-02-22 | End: 2022-02-23

## 2022-02-22 RX ADMIN — LIDOCAINE HYDROCHLORIDE 5 ML: 10 INJECTION, SOLUTION EPIDURAL; INFILTRATION; INTRACAUDAL; PERINEURAL at 09:26

## 2022-02-22 RX ADMIN — NICARDIPINE HYDROCHLORIDE 7 MG/HR: 2.5 INJECTION, SOLUTION INTRAVENOUS at 15:24

## 2022-02-22 RX ADMIN — POTASSIUM CHLORIDE 10 MEQ: 750 TABLET, EXTENDED RELEASE ORAL at 14:01

## 2022-02-22 RX ADMIN — Medication 25 MCG: at 11:49

## 2022-02-22 RX ADMIN — LIDOCAINE HYDROCHLORIDE 0.5 ML: 10 INJECTION, SOLUTION EPIDURAL; INFILTRATION; INTRACAUDAL; PERINEURAL at 09:24

## 2022-02-22 RX ADMIN — Medication 12.5 MG: at 20:53

## 2022-02-22 RX ADMIN — MUPIROCIN 1 APPLICATION: 20 OINTMENT TOPICAL at 08:12

## 2022-02-22 RX ADMIN — PROTAMINE SULFATE 30 MG: 10 INJECTION, SOLUTION INTRAVENOUS at 10:27

## 2022-02-22 RX ADMIN — ONDANSETRON 4 MG: 2 INJECTION INTRAMUSCULAR; INTRAVENOUS at 10:24

## 2022-02-22 RX ADMIN — TORSEMIDE 10 MG: 10 TABLET ORAL at 15:29

## 2022-02-22 RX ADMIN — Medication 12.5 MG: at 14:01

## 2022-02-22 RX ADMIN — PRAVASTATIN SODIUM 20 MG: 20 TABLET ORAL at 17:37

## 2022-02-22 RX ADMIN — BIMATOPROST 1 DROP: 0.1 SOLUTION/ DROPS OPHTHALMIC at 21:00

## 2022-02-22 RX ADMIN — NICARDIPINE HYDROCHLORIDE 5 MG/HR: 2.5 INJECTION, SOLUTION INTRAVENOUS at 11:05

## 2022-02-22 RX ADMIN — PROPOFOL 110 MG: 10 INJECTION, EMULSION INTRAVENOUS at 09:26

## 2022-02-22 RX ADMIN — CHLORHEXIDINE GLUCONATE 15 ML: 1.2 SOLUTION ORAL at 08:12

## 2022-02-22 RX ADMIN — CHLORHEXIDINE GLUCONATE 15 ML: 1.2 SOLUTION ORAL at 20:53

## 2022-02-22 RX ADMIN — ROCURONIUM BROMIDE 50 MG: 50 INJECTION, SOLUTION INTRAVENOUS at 09:26

## 2022-02-22 RX ADMIN — MUPIROCIN 1 APPLICATION: 20 OINTMENT TOPICAL at 20:53

## 2022-02-22 RX ADMIN — CALCIUM CHLORIDE 0.5 G: 100 INJECTION INTRAVENOUS; INTRAVENTRICULAR at 10:30

## 2022-02-22 RX ADMIN — PROPOFOL 40 MG: 10 INJECTION, EMULSION INTRAVENOUS at 10:23

## 2022-02-22 RX ADMIN — GLYCOPYRROLATE 0.4 MG: 0.2 INJECTION, SOLUTION INTRAMUSCULAR; INTRAVENOUS at 10:32

## 2022-02-22 RX ADMIN — FENTANYL CITRATE 50 MCG: 50 INJECTION INTRAMUSCULAR; INTRAVENOUS at 09:25

## 2022-02-22 RX ADMIN — MIDAZOLAM 2 MG: 1 INJECTION INTRAMUSCULAR; INTRAVENOUS at 09:23

## 2022-02-22 RX ADMIN — SODIUM CHLORIDE, SODIUM LACTATE, POTASSIUM CHLORIDE, AND CALCIUM CHLORIDE: .6; .31; .03; .02 INJECTION, SOLUTION INTRAVENOUS at 09:05

## 2022-02-22 RX ADMIN — FENTANYL CITRATE 50 MCG: 50 INJECTION INTRAMUSCULAR; INTRAVENOUS at 10:45

## 2022-02-22 RX ADMIN — SODIUM CHLORIDE, SODIUM GLUCONATE, SODIUM ACETATE, POTASSIUM CHLORIDE, MAGNESIUM CHLORIDE, SODIUM PHOSPHATE, DIBASIC, AND POTASSIUM PHOSPHATE 50 ML/HR: .53; .5; .37; .037; .03; .012; .00082 INJECTION, SOLUTION INTRAVENOUS at 11:00

## 2022-02-22 RX ADMIN — CEFAZOLIN 2000 MG: 1 INJECTION, POWDER, FOR SOLUTION INTRAMUSCULAR; INTRAVENOUS at 09:48

## 2022-02-22 RX ADMIN — PROTAMINE SULFATE 30 MG: 10 INJECTION, SOLUTION INTRAVENOUS at 10:25

## 2022-02-22 RX ADMIN — CEFAZOLIN SODIUM 2000 MG: 2 SOLUTION INTRAVENOUS at 18:35

## 2022-02-22 RX ADMIN — Medication 3 MG: at 23:51

## 2022-02-22 RX ADMIN — Medication 25 MCG: at 12:00

## 2022-02-22 RX ADMIN — NEOSTIGMINE METHYLSULFATE 3 MG: 1 INJECTION INTRAVENOUS at 10:32

## 2022-02-22 RX ADMIN — SODIUM CHLORIDE: 0.9 INJECTION, SOLUTION INTRAVENOUS at 09:28

## 2022-02-22 RX ADMIN — SODIUM CHLORIDE, SODIUM GLUCONATE, SODIUM ACETATE, POTASSIUM CHLORIDE, MAGNESIUM CHLORIDE, SODIUM PHOSPHATE, DIBASIC, AND POTASSIUM PHOSPHATE 50 ML/HR: .53; .5; .37; .037; .03; .012; .00082 INJECTION, SOLUTION INTRAVENOUS at 10:58

## 2022-02-22 RX ADMIN — DOCUSATE SODIUM 100 MG: 100 CAPSULE ORAL at 14:01

## 2022-02-22 RX ADMIN — ASPIRIN 81 MG CHEWABLE TABLET 81 MG: 81 TABLET CHEWABLE at 14:01

## 2022-02-22 RX ADMIN — HEPARIN SODIUM 18000 UNITS: 1000 INJECTION INTRAVENOUS; SUBCUTANEOUS at 10:16

## 2022-02-22 RX ADMIN — CLOPIDOGREL BISULFATE 75 MG: 75 TABLET ORAL at 14:01

## 2022-02-22 RX ADMIN — PROTAMINE SULFATE 30 MG: 10 INJECTION, SOLUTION INTRAVENOUS at 10:26

## 2022-02-22 NOTE — ANESTHESIA POSTPROCEDURE EVALUATION
Post-Op Assessment Note    CV Status:  Stable    Pain management: adequate     Mental Status:  Alert and awake   Hydration Status:  Euvolemic   PONV Controlled:  Controlled   Airway Patency:  Patent      Post Op Vitals Reviewed: Yes      Staff: CRNA         No complications documented      /62 (02/22/22 1046)    Temp (!) 97 1 °F (36 2 °C) (02/22/22 1046)    Pulse 82 (02/22/22 1046)   Resp 14 (02/22/22 1046)    SpO2 96 % (02/22/22 1046)

## 2022-02-22 NOTE — ANESTHESIA PROCEDURE NOTES
Procedure Performed: ELISSA Anesthesia  Start Time:  2/22/2022 9:48 AM        Preanesthesia Checklist    Patient identified, IV assessed, risks and benefits discussed, monitors and equipment assessed, procedure being performed at surgeon's request and anesthesia consent obtained  Procedure    Diagnostic Indications for ELISSA:  defect repair evaluation, hemodynamic monitoring and suspected pericardial effusion  Type of ELISSA: interventional ELISSA with real time guidance of intracardiac procedure  Images Saved: ultrasound permanent image saved  Physician Requesting Echo: Gui Bahena MD  Location performed: OR  Intubated  Bite block not placed  Heart visualized  Insertion of ELISSA Probe:  Atraumatic  Probe Type:  Multiplane  Modalities:  3D, color flow mapping, continuous wave Doppler and pulse wave Doppler  Echocardiographic and Doppler Measurements    PREPROCEDURE    LEFT VENTRICLE:  Systolic Function: normal  Ejection Fraction: 60%  RIGHT VENTRICLE:  Systolic Function: normal   Cavity size normal              AORTIC VALVE:  Leaflets: trileaflet  Leaflet motions restricted  Stenosis: severe  Mean Gradient: 19 mmHg  Peak Gradient: 31 mmHg  Maximum velocity (cm/s): 2 8 m/sec  Regurgitation: mild-mod and moderate  MITRAL VALVE:  Leaflet Motions: normal  Regurgitation: trace  TRICUSPID VALVE:  Leaflets: normal  Regurgitation: trace  ASCENDING AORTA:  Dissection not present  AORTIC ARCH:  dissection not present  Grade 3: atheroma protruding < 0 5 cm into lumen  DESCENDING AORTA:  Dissection not present  Grade 3: atheroma protruding < 0 5 cm into lumen  LEFT ATRIAL APPENDAGE:  Emptying Velocity: 97 cm/sec  OTHER ATRIAL FINDINGS:  No MILE clot  ATRIAL SEPTUM:  Intra-atrial septal morphology: no PFO by CFD  POSTPROCEDURE    LEFT VENTRICLE: Unchanged   RIGHT VENTRICLE: Unchanged             AORTIC VALVE:   Leaflets: PAULO valve in aortic position, well-seated, does not rock, good leaflet excursion and closure, trace PVL x2 and bioprosthetic  Stenosis: AV VTI 31 cm, LVOT VTI 17 cm, MEHUL 2 56 cm^2  Mean Gradient: 3 mmHg  MITRAL VALVE: Unchanged   TRICUSPID VALVE: Unchanged   AORTA: Unchanged   Dissection: Dissection not present  REMOVAL:  Probe Removal: atraumatic

## 2022-02-22 NOTE — OP NOTE
OPERATIVE REPORT  PATIENT NAME: Kami Alberto    :  1945  MRN: [id number]  Pt Location: [facility] OR ROOM 02    SURGERY DATE: 2022    SURGEON: Thalia Maria MD     ASSISTANT: Víctor Ovalles PA-C    CO-SURGEON: Dr Saritha Simpson DIAGNOSIS Symptomatic severe aortic stenosis  POSTOPERATIVE DIAGNOSIS Symptomatic severe aortic stenosis  NYHA CLASS: 4    CCS CLASS: 4    PROCEDURE Transcatheter aortic valve replacement with a 29 mm Pan PAULO 3 bioprosthetic valve via a percutaneous left transfemoral approach  ANESTHESIA Dr Arlyn Castillo, general endotracheal anesthesia with transesophageal echocardiogram guidance  CARDIOPULMONARY BYPASS TIME 0  PACKS/TUBES/DRAINS None  ESTIMATED BLOOD LOSS: 25 mL    OPERATIVE TECHNIQUE The patient was taken to the operating room and placed supine on the operating table  Following the satisfactory induction of general anesthesia and placement of monitoring lines, the patient was prepped and draped in the usual sterile fashion  A time-out procedure was performed  The left common femoral artery was accessed percutaneously using Seldinger technique and fluoroscopy  Two (2) Perclose sutures were deployed in the standard fashion  The left common femoral vein was accessed in a similar fashion and was cannulated with a 6 [place] sheath  The femoral artery was cannulated with a 7 [place] sheath  A pigtail catheter was inserted and advanced through the left common femoral artery sheath into the right coronary cusp  Using a series of injections, the angle of deployment was determined  Through the left common femoral vein sheath, a balloon tip temporary pacing catheter was inserted into the right ventricle and its capture was confirmed  The patient was systemically heparinized   The left common femoral artery sheath was then removed over an extra-stiff wire and the delivery sheath was inserted through the left common femoral artery and advanced into the aorta  The aortic valve was crossed with a wire  A 29 mm PAULO 3 valve was then advanced through the sheath into the aorta and positioned onto the deployment balloon in the aorta and then advanced around the aortic arch and through the annulus of the aortic valve to the appropriate level  At this point, the catheter was desheathed in the standard fashion  The valve was positioned appropriately using a combination of fluoroscopy and transesophageal echocardiogram guidance  During an episode of rapid pacing, balloon deployment of the 29 mm PAULO 3 valve was performed  Following deployment, the position of the valve was confirmed by fluoroscopy and echocardiography and its position appeared appropriate with trace perivalvular leak  The valve delivery system was subsequently removed and the sheath was removed from the left common femoral artery while the Perclose sutures were secured and direct pressure was held  Protamine was administered with normalization of the ACT  Pressure was released from the left groin and there was no active bleeding and no evidence of hematoma development  The common femoral vein sheath was removed from the left and pressure was held  Sponge, needle, and instrument counts were reported as correct by the nursing staff  As the attending surgeon, I was present and scrubbed for all critical portions of this procedure  Final transesophageal echocardiogram demonstrated a well-positioned bioprosthetic transcatheter aortic valve with normal function and trace perivalvular leak         Michael Spring MD  DATE: February 22, 2022  TIME: 10:33 AM

## 2022-02-22 NOTE — CONSULTS
Consultation - Cardiology Team One  Maria L Bynum 77 y o  male MRN: [id number]  Unit/Bed#: [address] 294-40 Encounter: [id number]    Inpatient consult to Cardiology  Consult performed by: CLAUDIO Navarro  Consult ordered by: Alejandra Terrazas PA-C        Physician Requesting Consult: Naveed Guzman MD  Reason for Consult / Principal Problem: post-TAVR management    Assessment  1  Symptomatic severe aortic stenosis s/p TAVR w/ a 29 mm PUALO 3 valve  -Post-TAVR ELISSA today - Well-positioned bioprosthetic transcatheter aortic valve with normal function and trace perivalvular leak  EF 60%, normal LV and RV systolic function  Trace MR and TR    -Currently on aspirin, clopidogrel, pravastatin  2  Post-op HTN  -BP last recorded at 122/58  -On IV Nicardipine 9 mg/hr   -Previously on no oral antihypertensive agents  3  HLD, controlled  -Lipid panel 10/27/2021 - chol 185, trig 102, HDL 65,   -Home medication - simvastatin (no formulary)  -On pravastatin 40 mg daily  4  Obesity  -BMI 29 39  -Diet control  -Encourage exercise     Plan   -Follow up post-op TTE  -Continue aspirin, clopidogrel, and statin   -Continue to wean IV Nicardipine for SBP goal <140  Low dose metoprolol 12 5 mg q12h initiated per CTS, would recommend switching to either Norvasc or Lisinopril if BP remains an issue  No indication for a BB    -Aggressive pulmonary hygiene  -Strict I&Os, daily weights, and 2gm Na+ diet 1 8 L FR  -Continue to monitor on telemetry     History of Present Illness    HPI: Maria L Bynum is a 77y o  year old male w/a medical hx of history of symptomatic severe aortic stenosis, cardiac murmur, HLD, glaucoma, pre-diabetes, and obesity  He follows with Cardiologist Dr Jen Marrero  His aortic stenosis has been under close surveillance as an outpatient since having been diagnosed in 2018 on TTE imaging  Of recent he had been experiencing symptoms of exertional lightheadedness, fatigue, and dyspnea   Repeat TTE imaging from 12/2021 demonstrated progression of his AS from the moderate to severe range  He was thus referred to CT surgery as an outpatient whom ultimately recommended TAVR procedure  As part of pre-op workup he underwent a LHC procedure on 2/14/2022 which demonstrated no epicardial CAD  Jose Alfredo Garces He presented to the [facility] today for an elective TAVR procedure performed by Dr Cindy Chaudhry  Post-procedure was transferred to the step-down ICU for further hemodynamic monitoring  He was started on IV nicardipine for elevated blood pressure readings post-operatively  Cardiology was consulted for further treatment recs/management in the post-op setting  On current assessment the patient is awake, alert, and oriented  He denies CP, SOB, palpitations, dizzinesslightheadedness  He does complain of a headache but nothing severe  He reports feeling anxious and is tremulous  He states that he heard "something about the artery or vein in the right side of the neck not being right" after the TAVR, which is causing him to have increased anxiety  EKG reviewed personally: EKG 2/14/2022 - Sinus bradycardia, minimal voltage criteria for LVH    Telemetry reviewed personally: NSR with PVCs, HR 90s    Review of Systems   Constitutional: Negative for chills and malaise/fatigue  HENT: Negative  Eyes: Negative  Cardiovascular: Negative for chest pain, dyspnea on exertion, leg swelling, palpitations and syncope  Respiratory: Negative for cough, shortness of breath and wheezing  Skin: Negative  Musculoskeletal: Negative  Gastrointestinal: Negative for abdominal pain, constipation, nausea and vomiting  Genitourinary: Negative for dysuria  Neurological: Negative for dizziness, numbness and paresthesias  Psychiatric/Behavioral: The patient is nervous/anxious        Historical Information   Past Medical History:   Diagnosis Date    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, 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    CAD (coronary artery disease)     Cataract     CHF (congestive heart failure) (HCC)     Glaucoma     Hyperlipidemia     Moderate aortic insufficiency     Prediabetes     Severe aortic stenosis     Sinus bradycardia     Umbilical hernia      Past Surgical History:   Procedure Laterality Date    ARTHROSCOPY KNEE Right     BUNIONECTOMY Right     CARDIAC CATHETERIZATION Bilateral 2/14/2022    Procedure: Cardiac catheterization;  Surgeon: Nevin Samson MD;  Location:  CARDIAC CATH LAB;   Service: Cardiology    HEMORRHOID SURGERY      SHOULDER SURGERY  2012    TONSILLECTOMY      childhood     Social History     Substance and Sexual Activity   Alcohol Use Yes    Alcohol/week: 2 0 standard drinks    Types: 2 Cans of beer per week    Comment: twice a wk      Social History     Substance and Sexual Activity   Drug Use No     Social History     Tobacco Use   Smoking Status Never Smoker   Smokeless Tobacco Never Used     Family History:   Family History   Problem Relation Age of Onset    Colon cancer Mother     Other Father         heart problem    Parkinsonism Family        Meds/Allergies   all current active meds have been reviewed, current meds:   Current Facility-Administered Medications   Medication Dose Route Frequency    acetaminophen (TYLENOL) rectal suppository 650 mg  650 mg Rectal Q4H PRN    acetaminophen (TYLENOL) tablet 650 mg  650 mg Oral Q4H PRN    aspirin chewable tablet 81 mg  81 mg Oral Daily    bimatoprost (LUMIGAN) 0 01 % ophthalmic solution 1 drop  1 drop Both Eyes HS    bisacodyl (DULCOLAX) rectal suppository 10 mg  10 mg Rectal Daily PRN    ceFAZolin (ANCEF) IVPB (premix in dextrose) 2,000 mg 50 mL  2,000 mg Intravenous Q8H    chlorhexidine (PERIDEX) 0 12 % oral rinse 15 mL  15 mL Mouth/Throat BID    clopidogrel (PLAVIX) tablet 75 mg  75 mg Oral Daily    docusate sodium (COLACE) capsule 100 mg  100 mg Oral BID    [START ON 2/23/2022] heparin (porcine) subcutaneous injection 5,000 Units  5,000 Units Subcutaneous Q8H [facility]    insulin lispro (HumaLOG) 100 units/mL subcutaneous injection 1-6 Units  1-6 Units Subcutaneous TID AC    insulin lispro (HumaLOG) 100 units/mL subcutaneous injection 1-6 Units  1-6 Units Subcutaneous HS    metoprolol tartrate (LOPRESSOR) partial tablet 12 5 mg  12 5 mg Oral Q12H [facility]    multi-electrolyte (PLASMALYTE-A/ISOLYTE-S PH 7 4) IV solution  50 mL/hr Intravenous Continuous    mupirocin (BACTROBAN) 2 % nasal ointment 1 application  1 application Nasal Y16Z [facility]    niCARdipine (CARDENE) 25 mg (STANDARD CONCENTRATION) in sodium chloride 0 9% 250 mL  1-15 mg/hr Intravenous Titrated    ondansetron (ZOFRAN) injection 4 mg  4 mg Intravenous Q6H PRN    [START ON 2/23/2022] pantoprazole (PROTONIX) EC tablet 40 mg  40 mg Oral Daily Before Breakfast    polyethylene glycol (MIRALAX) packet 17 g  17 g Oral Daily    potassium chloride (K-DUR,KLOR-CON) CR tablet 10 mEq  10 mEq Oral Daily    pravastatin (PRAVACHOL) tablet 20 mg  20 mg Oral Daily With Dinner    torsemide (DEMADEX) tablet 10 mg  10 mg Oral Daily    and PTA meds:   Prior to Admission Medications   Prescriptions Last Dose Informant Patient Reported? Taking? Lumigan 0 01 % ophthalmic drops 2/21/2022 at Unknown time Self Yes Yes   mupirocin (BACTROBAN) 2 % nasal ointment 2/21/2022 at Unknown time  No Yes   Sig: into each nostril 2 (two) times a day (5 days prior to surgery)   simvastatin (ZOCOR) 10 mg tablet 2/17/2022 Self No No   Sig: Take 1 tablet by mouth once daily      Facility-Administered Medications: None     multi-electrolyte, 50 mL/hr, Last Rate: 50 mL/hr (02/22/22 1100)  niCARdipine, 1-15 mg/hr, Last Rate: 9 mg/hr (02/22/22 1144)        No Known Allergies    Objective   Vitals: Blood pressure 118/52, pulse 78, temperature 97 7 °F (36 5 °C), resp  rate 22, height 5' 9" (1 753 m), weight 90 3 kg (199 lb), SpO2 98 %  ,     Body mass index is 29 39 kg/m²  ,     Systolic (04DYW), EDL:379 , Min:118 , Max:156 Diastolic (71CHQ), SRT:18, Min:52, Max:86            Intake/Output Summary (Last 24 hours) at 2/22/2022 1300  Last data filed at 2/22/2022 1130  Gross per 24 hour   Intake 1400 ml   Output 400 ml   Net 1000 ml     Weight (last 2 days)     Date/Time Weight    02/22/22 0745 90 3 (199)        Invasive Devices  Report    Central Venous Catheter Line            CVC Central Lines 02/22/22 Triple <1 day          Peripheral Intravenous Line            Peripheral IV 02/22/22 Left Antecubital <1 day    Peripheral IV 02/22/22 Right Antecubital <1 day          Arterial Line            Arterial Line 02/22/22 Left Radial <1 day          Line            Arterial Sheath 6 Fr  Right Radial 8 days          Drain            Urethral Catheter Non-latex; Temperature probe 16 Fr  <1 day                  Physical Exam  Constitutional:       Appearance: Normal appearance  HENT:      Head: Normocephalic and atraumatic  Eyes:      Pupils: Pupils are equal, round, and reactive to light  Cardiovascular:      Rate and Rhythm: Normal rate and regular rhythm  Pulses: Normal pulses  Heart sounds: Normal heart sounds  Pulmonary:      Effort: No respiratory distress  Breath sounds: Normal breath sounds  No rales  Chest:      Chest wall: No tenderness  Abdominal:      General: There is no distension  Palpations: Abdomen is soft  Tenderness: There is no abdominal tenderness  Musculoskeletal:         General: Normal range of motion  Cervical back: Normal range of motion  Skin:     General: Skin is warm and dry  Comments: Left groin puncture site 2x2 dsg c/d/i   Neurological:      General: No focal deficit present  Mental Status: He is alert and oriented to person, place, and time  Mental status is at baseline     Psychiatric:      Comments: anxious     LABORATORY RESULTS:      CBC with diff:   Results from last 7 days   Lab Units 02/22/22  1050 02/22/22  1039 02/22/22  1022 02/22/22  1000 HEMOGLOBIN g/dL 12 8  --   --   --    I STAT HEMOGLOBIN g/dl  --  10 2* 10 5* 11 2*   HEMATOCRIT % 39 5  --   --   --    HEMATOCRIT, ISTAT %  --  30* 31* 33*   PLATELETS Thousands/uL 199  --   --   --    MPV fL 9 9  --   --   --        CMP:  Results from last 7 days   Lab Units 02/22/22  1050 02/22/22  1039 02/22/22  1022 02/22/22  1000   POTASSIUM mmol/L 4 5  --   --   --    CHLORIDE mmol/L 113*  --   --   --    CO2 mmol/L 23  --   --   --    CO2, I-STAT mmol/L  --  24 24 24   BUN mg/dL 19  --   --   --    CREATININE mg/dL 0 93  --   --   --    GLUCOSE, ISTAT mg/dl  --  99 102 111   CALCIUM mg/dL 9 3  --   --   --    EGFR ml/min/1 73sq m 80  --   --   --        BMP:  Results from last 7 days   Lab Units 02/22/22  1050 02/22/22  1039 02/22/22  1022 02/22/22  1022 02/22/22  1000 02/22/22  1000   POTASSIUM mmol/L 4 5  --   --   --   --   --    CHLORIDE mmol/L 113*  --   --   --   --   --    CO2 mmol/L 23  --   --   --   --   --    CO2, I-STAT mmol/L  --  24  --  24  --  24   BUN mg/dL 19  --   --   --   --   --    CREATININE mg/dL 0 93  --   --   --   --   --    GLUCOSE, ISTAT mg/dl  --  99   < > 102   < > 111   CALCIUM mg/dL 9 3  --   --   --   --   --     < > = values in this interval not displayed            No results found for: NTBNP                           Results from last 7 days   Lab Units 02/16/22  0806   INR  0 93     Lipid Profile:   Lab Results   Component Value Date    CHOL 246 11/20/2014     Lab Results   Component Value Date    HDL 65 10/27/2021    HDL 55 05/22/2020    HDL 61 (H) 04/05/2019     Lab Results   Component Value Date    LDLCALC 100 10/27/2021    LDLCALC 93 05/22/2020    LDLCALC 109 (H) 04/05/2019     Lab Results   Component Value Date    TRIG 102 10/27/2021    TRIG 61 05/22/2020    TRIG 93 04/05/2019         Cardiac testing:   Results for orders placed during the hospital encounter of 12/01/20    Echo complete with contrast if indicated    Narrative  [address] [facility] [address]    Transthoracic Echocardiogram  2D, M-mode, Doppler, and Color Doppler    Study date:  01-Dec-2020    Patient: Teresa Ventura  MR number: [id number]  Account number: [de-identified]  : 1945  Age: 69 years  Gender: Male  Status: Outpatient  Location: [facility]  Height: 69 in  Weight: 198 7 lb  BP:    Indications: Moderate Aortic Stenosis    Diagnoses: I35 0 - Nonrheumatic aortic (valve) stenosis    Sonographer:  Allie Berumen RDCS  Primary Physician:  Kendrick Dailey MD  Referring Physician:  Kendrick Dailey MD  Group:  [id number]  Interpreting Physician:  Missael Banuelos MD    SUMMARY    LEFT VENTRICLE:  Systolic function was normal  Ejection fraction was estimated to be 60 %  There were no regional wall motion abnormalities  Wall thickness was mildly increased  There was mild concentric hypertrophy  Doppler parameters were consistent with abnormal left ventricular relaxation (grade 1 diastolic dysfunction)  MITRAL VALVE:  There was mild regurgitation  AORTIC VALVE:  The valve was trileaflet  Leaflets exhibited marked calcification and markedly reduced cuspal separation  There was moderate to severe stenosis  There was mild regurgitation  Valve mean gradient was 28 mmHg  Estimated aortic valve area (by VTI) was 0 99 cmï¾²  Estimated aortic valve area (by Vmax) was 0 91 cmï¾²  TRICUSPID VALVE:  There was trace regurgitation  HISTORY: PRIOR HISTORY: Moderate Aortic Stenosis, Hyperlipidemia    PROCEDURE: The study was performed in the [facility]  This was a routine study  The transthoracic approach was used  The study included complete 2D imaging, M-mode, complete spectral Doppler, and color Doppler  Images were obtained from the parasternal, apical, subcostal, and suprasternal notch acoustic windows  Image quality was adequate      LEFT VENTRICLE: Size was normal  Systolic function was normal  Ejection fraction was estimated to be 60 %  There were no regional wall motion abnormalities  Wall thickness was mildly increased  There was mild concentric hypertrophy  DOPPLER: Doppler parameters were consistent with abnormal left ventricular relaxation (grade 1 diastolic dysfunction)  RIGHT VENTRICLE: The size was normal  Systolic function was normal  Wall thickness was normal     LEFT ATRIUM: Size was normal     RIGHT ATRIUM: Size was normal     MITRAL VALVE: Valve structure was normal  There was normal leaflet separation  DOPPLER: The transmitral velocity was within the normal range  There was no evidence for stenosis  There was mild regurgitation  AORTIC VALVE: The valve was trileaflet  Leaflets exhibited marked calcification and markedly reduced cuspal separation  DOPPLER: Transaortic velocity was increased due to valvular stenosis  There was moderate to severe stenosis  There was  mild regurgitation  TRICUSPID VALVE: The valve structure was normal  There was normal leaflet separation  DOPPLER: The transtricuspid velocity was within the normal range  There was no evidence for stenosis  There was trace regurgitation  PULMONIC VALVE: Leaflets exhibited normal thickness, no calcification, and normal cuspal separation  DOPPLER: The transpulmonic velocity was within the normal range  There was no significant regurgitation  PERICARDIUM: There was no pericardial effusion  The pericardium was normal in appearance  AORTA: The root exhibited normal size  SYSTEMIC VEINS: IVC: The inferior vena cava was normal in size  PULMONARY VEINS: DOPPLER: Doppler signals were not recordable in the pulmonary vein(s)      MEASUREMENT TABLES    2D MEASUREMENTS  LVOT   (Reference normals)  Diam   22 mm   (--)    DOPPLER MEASUREMENTS  LVOT   (Reference normals)  Peak maria elena   85 cm/s   (--)  VTI   23 cm   (--)  Stroke vol   87 43 ml   (--)  Aortic valve   (Reference normals)  Peak dominic   350 cm/s   (--)  VTI   90 cm   (--)  Mean gradient   28 mmHg   (--)  Obstr index, VTI   0 26    (--)  Valve area, VTI   0 99 cmï¾²   (--)  Obstr index, Vmax   0 24    (--)  Valve area, Vmax   0 91 cmï¾²   (--)    SYSTEM MEASUREMENT TABLES    2D  %FS: 32 11 %  Ao Diam: 3 05 cm  Ao asc: 3 26 cm  EDV(Teich): 129 34 ml  EF(Teich): 59 9 %  ESV(Teich): 51 86 ml  IVSd: 1 13 cm  LA Area: 17 44 cm2  LA Diam: 4 04 cm  LVEDV MOD A4C: 116 14 ml  LVEF MOD A4C: 65 11 %  LVESV MOD A4C: 40 53 ml  LVIDd: 5 2 cm  LVIDs: 3 53 cm  LVLd A4C: 7 32 cm  LVLs A4C: 5 94 cm  LVOT Diam: 2 21 cm  LVPWd: 1 24 cm  RA Area: 15 6 cm2  RVIDd: 4 08 cm  SV MOD A4C: 75 62 ml  SV(Teich): 77 48 ml    CW  AV Env  Ti: 343 17 ms  AV VTI: 67 78 cm  AV Vmax: 2 66 m/s  AV Vmean: 1 9 m/s  AV maxP 81 mmHg  AV meanP 4 mmHg    MM  TAPSE: 2 99 cm    PW  MEHUL (VTI): 1 16 cm2  MEHUL Vmax: 1 08 cm2  AVAI (VTI): 0 cm2/m2  AVAI Vmax: 0 cm2/m2  E' Sept: 0 05 m/s  E/E' Sept: 12 28  LVOT Env  Ti: 382 49 ms  LVOT VTI: 20 55 cm  LVOT Vmax: 0 75 m/s  LVOT Vmean: 0 54 m/s  LVOT maxP 26 mmHg  LVOT meanP 31 mmHg  LVSI Dopp: 38 21 ml/m2  LVSV Dopp: 78 72 ml  MV A Dominic: 0 88 m/s  MV Dec Ellis: 2 52 m/s2  MV DecT: 243 91 ms  MV E Dominic: 0 61 m/s  MV E/A Ratio: 0 7  MV PHT: 70 73 ms  MVA By PHT: 3 11 cm2    [facility] Accredited [facility]    Prepared and electronically signed by    Eunice Osborne MD  Signed 01-Dec-2020 17:11:18    No results found for this or any previous visit  No valid procedures specified  No results found for this or any previous visit  Imaging: I have personally reviewed pertinent reports     and I have personally reviewed pertinent films in PACS  Cardiac catheterization    Result Date: 2/15/2022  Narrative: No epicardial CAD    ELISSA Anesthesia    Result Date: 2022  Narrative: Adryan Loya MD     2022 11:55 AM Procedure Performed: ELISSA Anesthesia Start Time:  2022 9:48 AM Preanesthesia Checklist Patient identified, IV assessed, risks and benefits discussed, monitors and equipment assessed, procedure being performed at surgeon's request and anesthesia consent obtained  Procedure Diagnostic Indications for ELISSA:  defect repair evaluation, hemodynamic monitoring and suspected pericardial effusion  Type of ELISSA: interventional ELISSA with real time guidance of intracardiac procedure  Images Saved: ultrasound permanent image saved  Physician Requesting Echo: Lazaro Mclaughlin MD  Location performed: OR  Intubated  Bite block not placed  Heart visualized  Insertion of ELISSA Probe:  Atraumatic  Probe Type:  Multiplane  Modalities:  3D, color flow mapping, continuous wave Doppler and pulse wave Doppler  Echocardiographic and Doppler Measurements PREPROCEDURE LEFT VENTRICLE: Systolic Function: normal  Ejection Fraction: 60%  RIGHT VENTRICLE: Systolic Function: normal   Cavity size normal  AORTIC VALVE: Leaflets: trileaflet  Leaflet motions restricted  Stenosis: severe  Mean Gradient: 19 mmHg  Peak Gradient: 31 mmHg  Maximum velocity (cm/s): 2 8 m/sec  Regurgitation: mild-mod and moderate  MITRAL VALVE: Leaflet Motions: normal  Regurgitation: trace  TRICUSPID VALVE: Leaflets: normal  Regurgitation: trace  ASCENDING AORTA: Dissection not present  AORTIC ARCH: dissection not present  Grade 3: atheroma protruding < 0 5 cm into lumen  DESCENDING AORTA: Dissection not present  Grade 3: atheroma protruding < 0 5 cm into lumen  LEFT ATRIAL APPENDAGE: Emptying Velocity: 97 cm/sec  OTHER ATRIAL FINDINGS: No MILE clot  ATRIAL SEPTUM: Intra-atrial septal morphology: no PFO by CFD  POSTPROCEDURE LEFT VENTRICLE: Unchanged   RIGHT VENTRICLE: Unchanged   AORTIC VALVE: Leaflets: PAULO valve in aortic position, well-seated, does not rock, good leaflet excursion and closure, trace PVL x2 and bioprosthetic  Stenosis: AV VTI 31 cm, LVOT VTI 17 cm, MEHUL 2 56 cm^2  Mean Gradient: 3 mmHg  MITRAL VALVE: Unchanged   TRICUSPID VALVE: Unchanged   AORTA: Unchanged   Dissection: Dissection not present  REMOVAL: Probe Removal: atraumatic  VAS carotid complete study    Result Date: 2/7/2022  Narrative:  [facility] REPORT CLINICAL: Indications: Patient presents for a general health evaluation secondary to future TAVR  Patient is asymptomatic at this time  Physician wants to rule out B/L ICA stenosis  Operative History: No prior heart or vascular surgery Risk Factors: Hyperlipidemia, pre diabetes, moderate aortic stenosis, and heart murmur  He is a non-smoker  Height:  69 inches  Weight:  205 lbs  Clinical Right Pressure:  152/69 mmHg  Left:  Pressure dressing  FINDINGS:  Right        Impression  PSV  EDV (cm/s)  Direction of Flow  Ratio  Dist  ICA                 58          23                      0 60  Mid  ICA                  80          22                      0 83  Prox  ICA    1 - 49%     104          20                      1 08  Dist CCA                  84          20                            Mid CCA                   96          14                      1 26  Prox CCA                  76          10                            Ext Carotid              103           8                      1 07  Prox Vert                 36          11  Antegrade                 Subclavian                96           0                             Left         Impression         PSV  EDV (cm/s)  Direction of Flow  Ratio  Mid  ICA                        101          27                      1 53  Prox   ICA    1 - 49%             56          15                      0 84  Dist CCA                         73          13                            Mid CCA                          66          12                      0 71  Prox CCA                         93          18                            Ext Carotid  Moderate stenosis  224          32                      3 38  Prox Vert                        37          11  Antegrade Subclavian                       94           9                               CONCLUSION:  Impression  RIGHT: There is <50% stenosis noted in the tortuous internal carotid artery  Plaque is homogenous and smooth  Vertebral artery flow is antegrade  There is no significant subclavian artery disease  LEFT: There is <50% stenosis noted in the internal carotid artery  Plaque is heterogenous and smooth  Vertebral artery flow is antegrade  There is no significant subclavian artery disease  A moderate stenosis was identified in the proximal ECA  Technical findings faxed to chart  Internal carotid artery stenosis determination by consensus criteria from: Irlanda Felix et al  Carotid Artery Stenosis: Gray-Scale and Doppler US Diagnosis - Society of Radiologists in [facility] [address], Radiology 2003; [phone]:[id number]  SIGNATURE: Electronically Signed by: Monae Mcduffie DO, RPVI on 2022-02-07 02:20:47 PM    XR chest portable ICU    Result Date: 2/22/2022  Narrative: CHEST INDICATION:   S/P Transcatheter aortic valve replacement  COMPARISON:  None EXAM PERFORMED/VIEWS:  XR CHEST PORTABLE ICU FINDINGS:  Status post TAVR  Right-sided IJ catheter courses medially with tip projecting over the mid mediastinum  Please confirm venous placement  Mild cardiomegaly  The lungs are clear  No pneumothorax or pleural effusion  Osseous structures appear within normal limits for patient age  Impression: Right-sided IJ catheter courses medially with tip projecting over the mid mediastinum  Please confirm venous placement  The study was marked in [facility] for immediate notification  Workstation performed: [id number]     CTA chest abdomen pelvis w wo contrast TAVR    Result Date: 2/7/2022  Narrative: CT ANGIOGRAM OF THE CHEST, ABDOMEN AND PELVIS WITH AND WITHOUT IV CONTRAST INDICATION: Preoperative evaluation for TAVR COMPARISON: None   TECHNIQUE:  CT angiogram examination of the chest, abdomen and pelvis was performed according to TAVR protocol including cardiac gating  Contrast as well as noncontrast images were obtained  120 ml of Visipaque 320 was injected intravenously  3D reconstructions were performed an independent workstation, and are supplied for review  This examination, like all CT scans performed in the [facility], was performed utilizing techniques to minimize radiation dose exposure, including the use of iterative reconstruction and automated exposure control  FINDINGS: VASCULAR STRUCTURES:     Annulus: diameter 30 9 x 22 0 mm     area: 502 0 sq mm   Annulus to LCA: 13 5 mm   Annulus to RCA: 12 6 mm   Minimal diameter right iliofemoral segment: 9 0 mm   Minimal diameter left iliofemoral segment: 9 0 mm Cardiac findings: There is moderate calcification of the aortic valve leaflets  No prior valvular surgery  No prior bypass surgery  No pericardial effusion  No cardiac mass or thrombus identified  The ascending aorta is nonaneurysmal measuring 32 mm with minimal atherosclerosis  There is a type II aortic arch with classic branching anatomy and no stenosis in the visualized great vessels  The aortic arch is nonaneurysmal with mild  atherosclerosis  The descending thoracic aorta is nonaneurysmal with mild  atherosclerosis  The abdominal aorta is moderately calcified without focal stenosis   There is no significant aortoiliac occlusive disease  No significant visceral artery stenosis is identified  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  OTHER FINDINGS: CHEST: LUNGS:  Unremarkable  PLEURA: No pleural effusion  MEDIASTINUM AND VIKAS:  No mass or significant lymphadenopathy  CHEST WALL AND LOWER NECK: Unremarkable  ABDOMEN LIVER/BILIARY TREE:  Unremarkable  GALLBLADDER:  No calcified gallstones  No pericholecystic inflammatory change  SPLEEN:  Unremarkable  Normal size  PANCREAS:  Unremarkable  ADRENAL GLANDS:  Unremarkable  KIDNEYS/URETERS:  No solid renal mass  No hydronephrosis  No urinary tract calculi  PELVIS REPRODUCTIVE ORGANS:  Unremarkable for patient's age  URINARY BLADDER:  Unremarkable  ADDITIONAL ABDOMINAL AND PELVIC STRUCTURES: STOMACH AND BOWEL:  Unremarkable  ABDOMINOPELVIC CAVITY:  No pathologically enlarged mesenteric or retroperitoneal lymph nodes  No ascites or free intraperitoneal air  Few scattered nonenlarged mesenteric nodes  ABDOMINAL WALL/INGUINAL REGIONS: Unremarkable  OSSEOUS STRUCTURES:  No acute fracture or destructive osseous lesion  Impression: 1  TAVR measurements:   Annulus: diameter 30 9 x 22 0 mm     area: 502 0 sq mm   Annulus to LCA: 13 5 mm   Annulus to RCA: 12 6 mm   Minimal diameter right iliofemoral segment: 9 0 mm   Minimal diameter left iliofemoral segment: 9 0 mm 2  No significant aortoiliac occlusive disease 3  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 mm Workstation performed: [id number]     ELISSA intraop interventional w/realtime guidance of cardiac procedures    Result Date: 2/22/2022  Narrative: This order contains the linked images for the ELISSA that was performed by the Anesthesiologist   Please see the  CARDIAC ELISSA ANESTHESIA procedure for results  Assessment  Principal Problem:    Severe aortic stenosis  Active Problems:    Hyperlipidemia    Prediabetes    S/P TAVR (transcatheter aortic valve replacement)    Hyperchloremia    CAD (coronary artery disease)    [facility]congest[facility]) ([facility] )                   Thank you for allowing us to participate in this patient's care  This pt will follow up with          once discharged  Counseling / Coordination of Care  Total floor / unit time spent today 45 minutes  Greater than 50% of total time was spent with the patient and / or family counseling and / or coordination of care  A description of the counseling / coordination of care: Review of history, current assessment, development of a plan        Code Status: Level 1 - Full Code    ** Please Note: Dragon 360 Dictation voice to text software may have been used in the creation of this document   **

## 2022-02-22 NOTE — ANESTHESIA PROCEDURE NOTES
Central Line Insertion  Performed by: Al Verde MD  Authorized by: Al Verde MD     Date/Time: 2/22/2022 9:34 AM  Catheter Type:  triple lumen  Consent: Verbal consent obtained  Written consent obtained  Risks and benefits: risks, benefits and alternatives were discussed  Consent given by: patient  Patient understanding: patient states understanding of the procedure being performed  Patient consent: the patient's understanding of the procedure matches consent given  Required items: required blood products, implants, devices, and special equipment available  Patient identity confirmed: arm band  Indications: vascular access  Location details: right internal jugular  Catheter size: 7 Fr  Patient position: Trendelenburg  Anesthesia method: ga    Assessment: blood return through all ports and free fluid flow  Preparation: skin prepped with ChloraPrep  Skin prep agent dried: skin prep agent completely dried prior to procedure  Sterile barriers: all five maximum sterile barriers used - cap, mask, sterile gown, sterile gloves, and large sterile sheet  Hand hygiene: hand hygiene performed prior to central venous catheter insertion  Ultrasound guidance: yes  sterile gel and probe cover used in ultrasound-guided central venous catheter insertionultrasound permanent image saved  Vessel of Catheter Tip End: central venous  Number of attempts: 1  Successful placement: yes  Post-procedure: chlorhexidine patch applied and dressing applied  Patient tolerance: patient tolerated the procedure well with no immediate complications

## 2022-02-22 NOTE — INTERVAL H&P NOTE
Vitals:    02/22/22 0735   BP: 156/86   Pulse: 72   Resp: 16   Temp: (!) 97 2 °F (36 2 °C)   SpO2: 98%         H&P reviewed  After examining the patient I find no changes in the patients condition since the H&P was completed  Plan for TAVR  Preoperative Beta Blocker: relative contraindication    Anticipated Length of Stay: Patient will be admitted on an inpatient basis with an anticipated length of stay of grater than 2 midnights  Justification for Hospital StaY: Post surgical recovery following open heart surgery        Brittny Arreguin MD  02/22/22  8:51 AM

## 2022-02-22 NOTE — RESPIRATORY THERAPY NOTE
RT Protocol Note  Rhianna Pak 77 y o  male MRN: [id number]  Unit/Bed#: [facility] 421-01 Encounter: [id number]    Assessment    Principal Problem:    Severe aortic stenosis  Active Problems:    Hyperlipidemia    Prediabetes    S/P TAVR (transcatheter aortic valve replacement)    Hyperchloremia    CAD (coronary artery disease)    CHF (congestive heart failure) ([facility])      Home Pulmonary Medications:  Home Devices/Therapy: (P)  (none)    Past Medical History:   Diagnosis Date    Aneurysm of abdominal vessel ([address] )     hypogastric artery, 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    CAD (coronary artery disease)     Cataract     CH[facility]congest[facility] failure) [facility]     Glaucoma     Hyperlipidemia     Moderate aortic insufficiency     Prediabetes     Severe aortic stenosis     Sinus bradycardia     Umbilical hernia      Social History     Socioeconomic History    Marital status: /Civil Union     Spouse name: None    Number of children: None    Years of education: None    Highest education level: None   Occupational History    None   Tobacco Use    Smoking status: Never Smoker    Smokeless tobacco: Never Used   Vaping Use    Vaping Use: Never used   Substance and Sexual Activity    Alcohol use:  Yes     Alcohol/week: 2 0 standard drinks     Types: 2 Cans of beer per week     Comment: twice a wk     Drug use: No    Sexual activity: None   Other Topics Concern    None   Social History Narrative    None     Social Determinants of Health     Financial Resource Strain: Not on file   Food Insecurity: Not on file   Transportation Needs: Not on file   Physical Activity: Not on file   Stress: Not on file   Social Connections: Not on file   Intimate Partner Violence: Not on file   Housing Stability: Not on file       Subjective         Objective    Physical Exam:   Assessment Type: (P) Assess only  General Appearance: (P) Alert,Awake  Respiratory Pattern: (P) Normal  Chest Assessment: (P) Chest expansion symmetrical  Bilateral Breath Sounds: (P) Clear  Cough: (P) None  O2 Device: (P) RMA    Vitals:  Blood pressure 128/59, pulse (P) 84, temperature 97 5 °F (36 4 °C), temperature source Axillary, resp  rate 20, height 5' 9" (1 753 m), weight 90 3 kg (199 lb), SpO2 (P) 96 %  Imaging and other studies: I have personally reviewed pertinent reports        O2 Device: (P) RMA     Plan       Airway Clearance Plan: (P) Incentive Spirometer     Resp Comments: (P) pt assessed for ACP and resp protocol, pt is awake and in no distress at this time, he is on RMA satting 96%, takes no resp medications at home, instructed on Is and pt demonstrated good technique, [id number], will D/C resp protocol at this timeand continue IS Q1 hour W/A

## 2022-02-22 NOTE — ANESTHESIA PROCEDURE NOTES
Arterial Line Insertion    Date/Time: 2/22/2022 9:24 AM  Performed by: Vani Barragan MD  Authorized by: Vani Barragan MD   Consent: Verbal consent obtained  Written consent obtained  Risks and benefits: risks, benefits and alternatives were discussed  Consent given by: patient  Patient understanding: patient states understanding of the procedure being performed  Required items: required blood products, implants, devices, and special equipment available  Patient identity confirmed: arm band  Preparation: Patient was prepped and draped in the usual sterile fashion  Indications: multiple ABGs and hemodynamic monitoring  Orientation:  Left  Location: radial arterylidocaine (PF) (XYLOCAINE-MPF) 1 % infiltration, 0 5 mL  Sedation:  Patient sedated: yes  Analgesia: see MAR for details  Vitals: Vital signs were monitored during sedation      Procedure Details:  Needle gauge: 20  Seldinger technique: Seldinger technique used  Number of attempts: 1    Post-procedure:  Post-procedure: dressing applied  Waveform: good waveform and waveform confirmed  Post-procedure CNS: normal and unchanged  Patient tolerance: patient tolerated the procedure well with no immediate complications  Comments: Pre-induction, 1 minute

## 2022-02-22 NOTE — RESPIRATORY THERAPY NOTE
resp care      02/22/22 1200   Respiratory Assessment   Bilateral Breath Sounds Clear   Resp Comments Patient having test at bedside   Vent Information   SpO2 98 %

## 2022-02-23 ENCOUNTER — APPOINTMENT (INPATIENT)
Dept: RADIOLOGY | Facility: HOSPITAL | Age: 77
DRG: 267 | End: 2022-02-23
Payer: MEDICARE

## 2022-02-23 ENCOUNTER — TRANSITIONAL CARE MANAGEMENT (OUTPATIENT)
Dept: FAMILY MEDICINE CLINIC | Facility: HOSPITAL | Age: 77
End: 2022-02-23

## 2022-02-23 VITALS
TEMPERATURE: 98.5 F | SYSTOLIC BLOOD PRESSURE: 134 MMHG | BODY MASS INDEX: 29.94 KG/M2 | HEIGHT: 69 IN | WEIGHT: 202.16 LBS | DIASTOLIC BLOOD PRESSURE: 63 MMHG | OXYGEN SATURATION: 97 % | RESPIRATION RATE: 19 BRPM | HEART RATE: 85 BPM

## 2022-02-23 LAB
ABO GROUP BLD BPU: NORMAL
ANION GAP SERPL CALCULATED.3IONS-SCNC: 6 MMOL/L (ref 4–13)
AORTIC ROOT: 3.4 CM
AORTIC VALVE MEAN VELOCITY: 21.6 M/S
APICAL FOUR CHAMBER EJECTION FRACTION: 64 %
ASCENDING AORTA: 3.2 CM (ref 2.09–3.13)
ATRIAL RATE: 80 BPM
AV AREA BY CONTINUOUS VTI: 1.6 CM2
AV AREA PEAK VELOCITY: 1.5 CM2
AV LVOT MEAN GRADIENT: 3 MMHG
AV LVOT PEAK GRADIENT: 5 MMHG
AV MEAN GRADIENT: 21 MMHG
AV PEAK GRADIENT: 37 MMHG
AV VALVE AREA: 1.65 CM2
AV VELOCITY RATIO: 0.38
BPU ID: NORMAL
BUN SERPL-MCNC: 16 MG/DL (ref 5–25)
CALCIUM SERPL-MCNC: 9.4 MG/DL (ref 8.3–10.1)
CHLORIDE SERPL-SCNC: 105 MMOL/L (ref 100–108)
CO2 SERPL-SCNC: 26 MMOL/L (ref 21–32)
CREAT SERPL-MCNC: 1.11 MG/DL (ref 0.6–1.3)
CROSSMATCH: NORMAL
DOP CALC AO PEAK VEL: 3.04 M/S
DOP CALC AO VTI: 50.64 CM
DOP CALC LVOT AREA: 3.8 CM2
DOP CALC LVOT DIAMETER: 2.2 CM
DOP CALC LVOT PEAK VEL VTI: 21.95 CM
DOP CALC LVOT PEAK VEL: 1.16 M/S
DOP CALC LVOT STROKE INDEX: 39.3 ML/M2
DOP CALC LVOT STROKE VOLUME: 83.4 CM3
E WAVE DECELERATION TIME: 182 MS
ERYTHROCYTE [DISTWIDTH] IN BLOOD BY AUTOMATED COUNT: 12 % (ref 11.6–15.1)
FRACTIONAL SHORTENING: 33 % (ref 28–44)
GFR SERPL CREATININE-BSD FRML MDRD: 64 ML/MIN/1.73SQ M
GLUCOSE SERPL-MCNC: 120 MG/DL (ref 65–140)
GLUCOSE SERPL-MCNC: 126 MG/DL (ref 65–140)
GLUCOSE SERPL-MCNC: 139 MG/DL (ref 65–140)
HCT VFR BLD AUTO: 38.5 % (ref 36.5–49.3)
HGB BLD-MCNC: 12.9 G/DL (ref 12–17)
INTERVENTRICULAR SEPTUM IN DIASTOLE (PARASTERNAL SHORT AXIS VIEW): 1.3 CM (ref 0.55–1.02)
INTERVENTRICULAR SEPTUM: 1.3 CM (ref 0.6–1.1)
LAAS-AP2: 18.9 CM2
LAAS-AP4: 22 CM2
LEFT ATRIUM SIZE: 3.8 CM
LEFT INTERNAL DIMENSION IN SYSTOLE: 3.4 CM (ref 3.32–5.03)
LEFT VENTRICULAR INTERNAL DIMENSION IN DIASTOLE: 5.1 CM (ref 5.49–8.18)
LEFT VENTRICULAR POSTERIOR WALL IN END DIASTOLE: 1.3 CM (ref 0.53–1.01)
LEFT VENTRICULAR STROKE VOLUME: 75 ML
LVSV (TEICH): 75 ML
MAGNESIUM SERPL-MCNC: 1.8 MG/DL (ref 1.6–2.6)
MCH RBC QN AUTO: 33.3 PG (ref 26.8–34.3)
MCHC RBC AUTO-ENTMCNC: 33.5 G/DL (ref 31.4–37.4)
MCV RBC AUTO: 100 FL (ref 82–98)
MV E'TISSUE VEL-SEP: 7 CM/S
MV PEAK A VEL: 1.19 M/S
MV PEAK E VEL: 77 CM/S
MV STENOSIS PRESSURE HALF TIME: 53 MS
MV VALVE AREA P 1/2 METHOD: 4.15 CM2
P AXIS: 16 DEGREES
PLATELET # BLD AUTO: 164 THOUSANDS/UL (ref 149–390)
PMV BLD AUTO: 10 FL (ref 8.9–12.7)
POTASSIUM SERPL-SCNC: 3.9 MMOL/L (ref 3.5–5.3)
PR INTERVAL: 124 MS
QRS AXIS: 9 DEGREES
QRSD INTERVAL: 82 MS
QT INTERVAL: 340 MS
QTC INTERVAL: 392 MS
RBC # BLD AUTO: 3.87 MILLION/UL (ref 3.88–5.62)
RIGHT ATRIUM AREA SYSTOLE A4C: 14 CM2
RIGHT VENTRICLE ID DIMENSION: 3.6 CM
SL CV LEFT ATRIUM LENGTH A2C: 5.3 CM
SL CV LV EF: 65
SL CV PED ECHO LEFT VENTRICLE DIASTOLIC VOLUME (MOD BIPLANE) 2D: 124 ML
SL CV PED ECHO LEFT VENTRICLE SYSTOLIC VOLUME (MOD BIPLANE) 2D: 49 ML
SODIUM SERPL-SCNC: 137 MMOL/L (ref 136–145)
T WAVE AXIS: 220 DEGREES
UNIT DISPENSE STATUS: NORMAL
UNIT PRODUCT CODE: NORMAL
UNIT PRODUCT VOLUME: 350 ML
UNIT RH: NORMAL
VENTRICULAR RATE: 80 BPM
WBC # BLD AUTO: 10.9 THOUSAND/UL (ref 4.31–10.16)
Z-SCORE OF ASCENDING AORTA: 2.27
Z-SCORE OF INTERVENTRICULAR SEPTUM IN END DIASTOLE: 4.25
Z-SCORE OF LEFT VENTRICULAR DIMENSION IN END DIASTOLE: -2.73
Z-SCORE OF LEFT VENTRICULAR DIMENSION IN END SYSTOLE: -1.46
Z-SCORE OF LEFT VENTRICULAR POSTERIOR WALL IN END DIASTOLE: 4.36

## 2022-02-23 PROCEDURE — 80048 BASIC METABOLIC PNL TOTAL CA: CPT | Performed by: THORACIC SURGERY (CARDIOTHORACIC VASCULAR SURGERY)

## 2022-02-23 PROCEDURE — 93010 ELECTROCARDIOGRAM REPORT: CPT | Performed by: INTERNAL MEDICINE

## 2022-02-23 PROCEDURE — 93306 TTE W/DOPPLER COMPLETE: CPT | Performed by: INTERNAL MEDICINE

## 2022-02-23 PROCEDURE — 85027 COMPLETE CBC AUTOMATED: CPT | Performed by: THORACIC SURGERY (CARDIOTHORACIC VASCULAR SURGERY)

## 2022-02-23 PROCEDURE — 99222 1ST HOSP IP/OBS MODERATE 55: CPT | Performed by: INTERNAL MEDICINE

## 2022-02-23 PROCEDURE — 71045 X-RAY EXAM CHEST 1 VIEW: CPT

## 2022-02-23 PROCEDURE — NC001 PR NO CHARGE: Performed by: PHYSICIAN ASSISTANT

## 2022-02-23 PROCEDURE — 93005 ELECTROCARDIOGRAM TRACING: CPT

## 2022-02-23 PROCEDURE — 97162 PT EVAL MOD COMPLEX 30 MIN: CPT

## 2022-02-23 PROCEDURE — 99238 HOSP IP/OBS DSCHRG MGMT 30/<: CPT | Performed by: THORACIC SURGERY (CARDIOTHORACIC VASCULAR SURGERY)

## 2022-02-23 PROCEDURE — 83735 ASSAY OF MAGNESIUM: CPT | Performed by: THORACIC SURGERY (CARDIOTHORACIC VASCULAR SURGERY)

## 2022-02-23 PROCEDURE — 82948 REAGENT STRIP/BLOOD GLUCOSE: CPT

## 2022-02-23 RX ORDER — PANTOPRAZOLE SODIUM 40 MG/1
40 TABLET, DELAYED RELEASE ORAL
Qty: 30 TABLET | Refills: 0 | Status: SHIPPED | OUTPATIENT
Start: 2022-02-24 | End: 2022-04-01 | Stop reason: ALTCHOICE

## 2022-02-23 RX ORDER — ASPIRIN 81 MG/1
81 TABLET, CHEWABLE ORAL DAILY
Qty: 30 TABLET | Refills: 2 | Status: SHIPPED | OUTPATIENT
Start: 2022-02-24

## 2022-02-23 RX ORDER — TORSEMIDE 10 MG/1
10 TABLET ORAL DAILY
Qty: 5 TABLET | Refills: 1 | Status: SHIPPED | OUTPATIENT
Start: 2022-02-24 | End: 2022-02-23

## 2022-02-23 RX ORDER — POTASSIUM CHLORIDE 750 MG/1
10 TABLET, EXTENDED RELEASE ORAL DAILY
Qty: 5 TABLET | Refills: 1 | Status: SHIPPED | OUTPATIENT
Start: 2022-02-24 | End: 2022-03-08 | Stop reason: ALTCHOICE

## 2022-02-23 RX ORDER — DOCUSATE SODIUM 100 MG/1
100 CAPSULE, LIQUID FILLED ORAL 2 TIMES DAILY
Refills: 0 | COMMUNITY
Start: 2022-02-23 | End: 2022-03-02

## 2022-02-23 RX ORDER — PANTOPRAZOLE SODIUM 40 MG/1
40 TABLET, DELAYED RELEASE ORAL
Qty: 30 TABLET | Refills: 0 | Status: SHIPPED | OUTPATIENT
Start: 2022-02-24 | End: 2022-02-23

## 2022-02-23 RX ORDER — POTASSIUM CHLORIDE 750 MG/1
10 TABLET, EXTENDED RELEASE ORAL DAILY
Qty: 5 TABLET | Refills: 1 | Status: SHIPPED | OUTPATIENT
Start: 2022-02-24 | End: 2022-02-23

## 2022-02-23 RX ORDER — ACETAMINOPHEN 325 MG/1
650 TABLET ORAL EVERY 6 HOURS PRN
Refills: 0 | COMMUNITY
Start: 2022-02-23

## 2022-02-23 RX ORDER — CLOPIDOGREL BISULFATE 75 MG/1
75 TABLET ORAL DAILY
Qty: 90 TABLET | Refills: 0 | Status: SHIPPED | OUTPATIENT
Start: 2022-02-23 | End: 2022-02-23

## 2022-02-23 RX ORDER — CLOPIDOGREL BISULFATE 75 MG/1
75 TABLET ORAL DAILY
Qty: 90 TABLET | Refills: 0 | Status: SHIPPED | OUTPATIENT
Start: 2022-02-23 | End: 2022-04-25 | Stop reason: ALTCHOICE

## 2022-02-23 RX ORDER — TORSEMIDE 10 MG/1
10 TABLET ORAL DAILY
Qty: 5 TABLET | Refills: 1 | Status: SHIPPED | OUTPATIENT
Start: 2022-02-24 | End: 2022-03-08 | Stop reason: ALTCHOICE

## 2022-02-23 RX ORDER — POLYETHYLENE GLYCOL 3350 17 G/17G
17 POWDER, FOR SOLUTION ORAL DAILY
Refills: 0 | COMMUNITY
Start: 2022-02-24 | End: 2022-03-02

## 2022-02-23 RX ADMIN — ASPIRIN 81 MG CHEWABLE TABLET 81 MG: 81 TABLET CHEWABLE at 08:36

## 2022-02-23 RX ADMIN — CHLORHEXIDINE GLUCONATE 15 ML: 1.2 SOLUTION ORAL at 08:36

## 2022-02-23 RX ADMIN — HEPARIN SODIUM 5000 UNITS: 5000 INJECTION INTRAVENOUS; SUBCUTANEOUS at 06:05

## 2022-02-23 RX ADMIN — CLOPIDOGREL BISULFATE 75 MG: 75 TABLET ORAL at 08:36

## 2022-02-23 RX ADMIN — CEFAZOLIN SODIUM 2000 MG: 2 SOLUTION INTRAVENOUS at 12:29

## 2022-02-23 RX ADMIN — CEFAZOLIN SODIUM 2000 MG: 2 SOLUTION INTRAVENOUS at 02:41

## 2022-02-23 RX ADMIN — POTASSIUM CHLORIDE 10 MEQ: 750 TABLET, EXTENDED RELEASE ORAL at 09:00

## 2022-02-23 RX ADMIN — Medication 12.5 MG: at 08:36

## 2022-02-23 RX ADMIN — PANTOPRAZOLE SODIUM 40 MG: 40 TABLET, DELAYED RELEASE ORAL at 06:06

## 2022-02-23 RX ADMIN — TORSEMIDE 10 MG: 10 TABLET ORAL at 12:29

## 2022-02-23 NOTE — RESTORATIVE TECHNICIAN NOTE
Restorative Technician Note      Patient Name: Shirley Vides     Restorative Tech Visit Date: 02/23/22  Note Type: Mobility  Patient Position Upon Consult: Bedside chair  Activity Performed: Ambulated  Assistive Device: Other (Comment) (none)  Patient Position at End of Consult: All needs within reach;  Bedside chair

## 2022-02-23 NOTE — PROGRESS NOTES
Progress Note - Cardiothoracic Surgery   Dylan Yeboah 77 y o  male MRN: [id number]  Unit/Bed#: [facility] 421-01 Encounter: [id number]    Aortic stenosis, Non-Rheumatic  S/P transfemoral transcatheter aortic valve replacement; POD # 1    24 Hour Events: No events overnight  No complaints this morning  Ambulating independently  Appetite is good  Voiding after feng removed      CXR post procedure yesterday with questionable central line placement - CT chest ordered and catheter was in the SVC    Medications:   Scheduled Meds:  Current Facility-Administered Medications   Medication Dose Route Frequency Provider Last Rate    acetaminophen  650 mg Rectal Q4H PRN Renetta Espana PA-C      acetaminophen  650 mg Oral Q4H PRN Rneetta Espana PA-C      aspirin  81 mg Oral Daily Renetta Espana PA-C      bimatoprost  1 drop Both Eyes HS Renetta Espana PA-C      bisacodyl  10 mg Rectal Daily PRN Renetta Espana PA-C      cefazolin  2,000 mg Intravenous Q8H Renetta Espana PA-C Stopped (02/23/22 0344)    chlorhexidine  15 mL Mouth/Throat BID Renetta Espana PA-C      clopidogrel  75 mg Oral Daily Renetta Espana PA-C      docusate sodium  100 mg Oral BID Renetta Espana PA-C      heparin (porcine)  5,000 Units Subcutaneous Q8H [facility] Renetta Espana PA-C      insulin lispro  1-6 Units Subcutaneous TID AC Renetta Espana PA-C      insulin lispro  1-6 Units Subcutaneous HS Renetta Espana PA-C      melatonin  3 mg Oral HS PRN Conrad Anderson PA-C      metoprolol tartrate  12 5 mg Oral Q12H [facility] Renetta Espana PA-C      mupirocin  1 application Nasal [id number] [facility] Renetta Espana PA-C      niCARdipine  1-15 mg/hr Intravenous Titrated Renetta Espana PA-C Stopped (02/22/22 8908)    ondansetron  4 mg Intravenous Q6H PRN Renetta Espana PA-C      pantoprazole  40 mg Oral Daily Before Breakfast Renetta Espana PA-C      polyethylene glycol  17 g Oral Daily Renetta Espana PA-C      potassium chloride  10 mEq Oral Daily Renetta Espana PA-C      pravastatin  20 mg Oral Daily With [employer]      torsemide  10 mg Oral Daily Renetta Espana PA-C       Continuous Infusions:niCARdipine, 1-15 mg/hr, Last Rate: Stopped (02/22/22 2246)      PRN Meds:   acetaminophen    acetaminophen    bisacodyl    melatonin    ondansetron    Vitals:   Vitals:    02/23/22 0500 02/23/22 0600 02/23/22 0700 02/23/22 0836   BP: 125/62 136/70 138/65 112/55   BP Location:   Right arm    Pulse: 70 85 83 93   Resp:   17    Temp:   99 °F (37 2 °C)    TempSrc:   Oral    SpO2: 94% 95% 96%    Weight:  91 7 kg (202 lb 2 6 oz)     Height:           Telemetry: NSR; Heart Rate: 70    Respiratory:   SpO2: SpO2: 96 %;  Room Air    Intake/Output:     Intake/Output Summary (Last 24 hours) at 2/23/2022 0913  Last data filed at 2/23/2022 0841  Gross per 24 hour   Intake 3453 29 ml   Output 3575 ml   Net -121 71 ml        Weights:   Weight (last 2 days)     Date/Time Weight    02/23/22 0600 91 7 (202 16)    02/22/22 1330 90 3 (199)    02/22/22 0745 90 3 (199)        Results:   Results from last 7 days   Lab Units 02/23/22  0505 02/22/22  1050 02/22/22  1039   WBC Thousand/uL 10 90*  --   --    HEMOGLOBIN g/dL 12 9 12 8  --    I STAT HEMOGLOBIN g/dl  --   --  10 2*   HEMATOCRIT % 38 5 39 5  --    HEMATOCRIT, ISTAT %  --   --  30*   PLATELETS Thousands/uL 164 199  --      Results from last 7 days   Lab Units 02/23/22  0505 02/22/22  1050 02/22/22  1039   POTASSIUM mmol/L 3 9 4 5  --    CHLORIDE mmol/L 105 113*  --    CO2 mmol/L 26 23  --    CO2, I-STAT mmol/L  --   --  24   BUN mg/dL 16 19  --    CREATININE mg/dL 1 11 0 93  --    GLUCOSE, ISTAT mg/dl  --   --  99   CALCIUM mg/dL 9 4 9 3  --          Point of care glucose: normal range    Studies:  CXR: No acute findings, line in the SVC,   EKG: NSR rate of 80    Of note from yesterday CT chest wo:  IMPRESSION:     Right jugular catheter in mid SVC, no longer to the left of midline as on the chest radiograph from earlier today  On the chest radiograph, it was likely in the hemiazygos vein and "flipped " into the SVC when it was flushed  I have personally reviewed pertinent reports  Invasive Lines/Tubes:  Invasive Devices  Report    Central Venous Catheter Line            CVC Central Lines 02/22/22 Triple <1 day          Peripheral Intravenous Line            Peripheral IV 02/22/22 Right Antecubital 1 day          Line            Arterial Sheath 6 Fr  Right Radial 8 days                Physical Exam:    HEENT/NECK:  Normocephalic  Atraumatic  No jugular venous distention  Cardiac: Regular rate and rhythm  Pulmonary:  Breath sounds clear bilaterally  Abdomen:  Non-tender, Non-distended and Normal bowel sounds  Incisions: Groin puncture sites dressed with sterile dressing  No hematoma, erythema, or drainage  Extremities: Extremities warm/dry and DP pulses 2+ bilaterally  Neuro: Alert and oriented X 3  Skin: Warm/Dry, without rashes or lesions  Assessment:  Principal Problem:    S/P TAVR (transcatheter aortic valve replacement)  Active Problems:    Hyperlipidemia    Prediabetes    Severe aortic stenosis    Hyperchloremia    CAD (coronary artery disease)    CHF (congestive heart failure) ([facility])       Aortic stenosis, Non-Rheumatic  S/P transfemoral transcatheter aortic valve replacement; POD # 1    Plan:    1  Cardiac:   NSR; HR/BP well-controlled  Continue Lopressor, 12 5mg PO BID  Central IV access no longer required; Remove central venous catheter today  ASA/Plavix  Consult cardiology for postoperative medical management  Follow up transthoracic echocardiogram today  Continue Statin therapy  Continue DVT prophylaxis    2  Pulmonary:   Extubated  CXR findings: nothing acute  Good Room air oxygen saturation; Continue incentive spirometry/Coughing/Deep breathing exercises    3  Renal:   Intake/Output net: -121 mL/24 hours  Post op Creatinine stable; Follow up labs prn    4     Neuro:  Neurologically intact; No active issues  Incisional pain well-controlled; Continue prn Tylenol    5  GI:  Tolerating diet  Maintain 1800 mL daily fluid restriction   Continue daily stool softeners and prn suppository  Continue GI prophylaxis    6  Endo:   Glucose well-controlled  Continue Insulin sliding scale coverage    7  Hematology:    Post-operative blood count acceptable; Trend prn    8     Disposition:   Gerontology consultation ordered for routine assessment of TAVR patient over 72years old  Anticipate discharge to home     VTE Pharmacologic Prophylaxis: Heparin  VTE Mechanical Prophylaxis: sequential compression device    Bedside rounds completed with supervising physician  Plan of care discussed with bedside nurse    SIGNATURE: Kristin Sorenson PA-C  DATE: February 23, 2022  TIME: 9:13 AM

## 2022-02-23 NOTE — DISCHARGE INSTRUCTIONS
Transcatheter Aortic Valve Replacement   WHAT YOU NEED TO KNOW:   · A TAVR is a procedure to replace your aortic valve  It is done without removing your old valve  The aortic valve is between the left ventricle and the aorta  The left ventricle is the lower left chamber of your heart  The aorta is a blood vessel that pumps blood to your brain and body  The aortic valve opens and closes to let blood flow from your heart to the rest of your body  · TAVR may be used to replace your aortic valve if open heart surgery is not safe for you  Your valve will be replaced with a tissue valve  The tissue may be taken from an animal, such as a pig or cow  DISCHARGE INSTRUCTIONS:   Call 911 for any of the following:   · You have any of the following signs of a heart attack:      ? Squeezing, pressure, or pain in your chest    ? You may  also have any of the following:     § Discomfort or pain in your back, neck, jaw, stomach, or arm    § Shortness of breath    § Nausea or vomiting    § Lightheadedness or a sudden cold sweat    · You have any of the following signs of a stroke:      ? Numbness or drooping on one side of your face     ? Weakness in an arm or leg    ? Confusion or difficulty speaking    ? Dizziness, a severe headache, or vision loss    · You feel lightheaded, short of breath, and have chest pain  · You cough up blood  · You have trouble breathing  Seek care immediately if:   · Blood soaks through your bandage  · Your stitches come apart  · Your wound gets swollen quickly  · Your heart is beating faster or slower than usual      · Your arm or leg feels numb, cool, or looks pale  · Your arm or leg feels warm, tender, and painful  It may look swollen and red  · You feel weak or dizzy  Contact your healthcare provider if:   · You have a fever or chills  · Your wound is red, swollen, or draining pus  · Your wound looks more bruised or there is new bruising near your wound  · You have nausea or are vomiting  · Your skin is itchy, swollen, or you have a rash  · You have questions or concerns about your condition or care  Medicines: You may need any of the following:  · Blood thinners  help prevent blood clots  Clots can cause strokes, heart attacks, and death  The following are general safety guidelines to follow while you are taking a blood thinner:    ? Watch for bleeding and bruising while you take blood thinners  Watch for bleeding from your gums or nose  Watch for blood in your urine and bowel movements  Use a soft washcloth on your skin, and a soft toothbrush to brush your teeth  This can keep your skin and gums from bleeding  If you shave, use an electric shaver  Do not play contact sports  ? Tell your dentist and other healthcare providers that you take a blood thinner  Wear a bracelet or necklace that says you take this medicine  ? Do not start or stop any other medicines unless your healthcare provider tells you to  Many medicines cannot be used with blood thinners  ? Take your blood thinner exactly as prescribed by your healthcare provider  Do not skip does or take less than prescribed  Tell your provider right away if you forget to take your blood thinner, or if you take too much  ? Warfarin  is a blood thinner that you may need to take  The following are things you should be aware of if you take warfarin:     § Foods and medicines can affect the amount of warfarin in your blood  Do not make major changes to your diet while you take warfarin  Warfarin works best when you eat about the same amount of vitamin K every day  Vitamin K is found in green leafy vegetables and certain other foods  Ask for more information about what to eat when you are taking warfarin  § You will need to see your healthcare provider for follow-up visits when you are on warfarin  You will need regular blood tests   These tests are used to decide how much medicine you need     · Antiplatelets , such as aspirin, help prevent blood clots  Take your antiplatelet medicine exactly as directed  These medicines make it more likely for you to bleed or bruise  If you are told to take aspirin, do not take acetaminophen or ibuprofen instead  · Acetaminophen  helps decrease your pain  This medicine is available without a doctor's order  Ask how much medicine is safe to take, and how often to take it  Acetaminophen can cause liver damage if not taken correctly  · Take your medicine as directed  Contact your healthcare provider if you think your medicine is not helping or if you have side effects  Tell him or her if you are allergic to any medicine  Keep a list of the medicines, vitamins, and herbs you take  Include the amounts, and when and why you take them  Bring the list or the pill bottles to follow-up visits  Carry your medicine list with you in case of an emergency  Care for your wound as directed:  Ask your healthcare provider when your wound can get wet  Carefully wash around the wound with soap and water  Do not scrub your wound  You can let soap and water gently run over your wound  Gently pat dry the area and put on new, clean bandages as directed  Check your wound every day for signs of infection such as swelling, pus, or redness  Change your bandages when they get wet or dirty  Do not put lotions or powders on your wound  Do not take a bath or swim until your healthcare provider says it is okay  These activities can increase your risk for a wound infection  Activity:  Do not lift anything heavier than 5 pounds or do vigorous activities such as sports  These actions will put too much stress on your wound and heart  Take short walks around the house several times a day  This will help prevent blood clots  Ask your healthcare provider what other activities are safe for you to do  Also ask when you can return to your normal activities and work or school     Self-care: · Eat heart healthy foods  You may need to eat foods that are low in salt, fat, or cholesterol  Healthy foods include fruits, vegetables, whole-grain breads, low-fat dairy products, beans, lean meats, and fish  Ask your healthcare provider for more information about a heart healthy diet  · Do not smoke  Nicotine and other chemicals in cigarettes and cigars can cause heart and lung damage  Nicotine can also slow healing  Ask your healthcare provider for information if you currently smoke and need help to quit  E-cigarettes or smokeless tobacco still contain nicotine  Talk to your healthcare provider before you use these products  · Do not drink alcohol  Ask your cardiologist if it is safe for you to drink alcohol  Alcohol can increase your risk for high blood pressure, diabetes, and coronary artery disease  · Maintain a healthy weight  Ask your healthcare provider how much you should weigh  Extra weight can increase the stress on your heart  Ask him to help you create a weight loss plan if you are overweight  · Exercise as directed after you recover  Your healthcare provider can help you create an exercise plan that is right for you  Exercise will help keep your heart healthy  Ask your healthcare provider if you need antibiotics before procedures:  Some procedures may allow bacteria to get into your blood and travel to your heart  This can cause an infection in your heart and prevent you from healing  You may need antibiotics before certain procedures that happen in the next 6 months to prevent infection  This may also include certain dental procedures  Ask your healthcare provider for more information  Follow up with your healthcare provider as directed: You may need to return for tests  These tests will make sure your valve is working correctly  Write down your questions so you remember to ask them during your visits    © Copyright [employer] 2021 Information is for End User's use only and may not be sold, redistributed or otherwise used for commercial purposes  All illustrations and images included in CareNotes® are the copyrighted property of A [employer] M [employer] Inc  or Elmer Hoffman  The above information is an  only  It is not intended as medical advice for individual conditions or treatments  Talk to your doctor, nurse or pharmacist before following any medical regimen to see if it is safe and effective for you  Clopidogrel (By mouth)   Clopidogrel ([id number]-[id number]-grel)  Prevents heart attack or stroke in patients with acute coronary syndrome or other heart problems  This medicine is an anti-platelet drug  Brand Name(s): Plavix   There may be other brand names for this medicine  When This Medicine Should Not Be Used: This medicine is not right for everyone  Do not use it if you had an allergic reaction to clopidogrel, or if you have active bleeding problems, including a bleeding stomach ulcer or bleeding in your brain  How to Use This Medicine:   Tablet  · Your doctor will tell you how much medicine to use  Do not use more than directed  · Your doctor may tell you to take aspirin with this medicine  Do not change the dose or stop taking the aspirin without talking to your doctor first   · This medicine should come with a Medication Guide  Ask your pharmacist for a copy if you do not have one  · Missed dose: Take a dose as soon as you remember  If it is almost time for your next dose, wait until then and take a regular dose  Do not take extra medicine to make up for a missed dose  · Store the medicine in a closed container at room temperature, away from heat, moisture, and direct light  Drugs and Foods to Avoid:   Ask your doctor or pharmacist before using any other medicine, including over-the-counter medicines, vitamins, and herbal products  · Some medicines can affect how clopidogrel works   Tell your doctor if you are using any of the following:   ? Esomeprazole, omeprazole, repaglinide, rifampin, warfarin  ? Medicine to treat depression (including SNRI, SSRI)  ? NSAID pain or arthritis medicine (including celecoxib, diclofenac, ibuprofen, or naproxen)  ? Opioid medicine  Warnings While Using This Medicine:   · Tell your doctor if you are pregnant or breastfeeding, or if you have bleeding problems, stomach ulcer or bleeding, a recent stroke, or a history of bleeding problems  · This medicine may cause the following problems:  ? Increased risk of bleeding, which can be life-threatening  ? Blood clotting problems, including thrombotic thrombocytopenic purpura  · Make sure any doctor or dentist who treats you knows that you are using this medicine  Tell your doctor if you plan to have surgery or a dental procedure  · Do not stop using this medicine suddenly  Your doctor will need to slowly decrease your dose before you stop it completely  · Your doctor will do lab tests at regular visits to check on the effects of this medicine  Keep all appointments  · Keep all medicine out of the reach of children  Never share your medicine with anyone  Possible Side Effects While Using This Medicine:   Call your doctor right away if you notice any of these side effects:  · Allergic reaction: Itching or hives, swelling in your face or hands, swelling or tingling in your mouth or throat, chest tightness, trouble breathing  · Bloody or black, tarry stools, red or dark brown urine  · Confusion, fever, pale skin, pinpoint red spots on skin, seizures, yellow eyes or skin  · Headache, problems with vision, speech, or walking  · Stomach pain, diarrhea, nausea, vomiting  · Trouble breathing, fast heartbeat  · Unusual bleeding, bruising, or weakness  · Vomiting of blood or material that looks like coffee grounds  If you notice other side effects that you think are caused by this medicine, tell your doctor  Call your doctor for medical advice about side effects   You may report side effects to FDA at 1-800-FDA-1088    © Copyright [employer] 2021 Information is for End User's use only and may not be sold, redistributed or otherwise used for commercial purposes  The above information is an  only  It is not intended as medical advice for individual conditions or treatments  Talk to your doctor, nurse or pharmacist before following any medical regimen to see if it is safe and effective for you

## 2022-02-23 NOTE — OCCUPATIONAL THERAPY NOTE
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    CAD (coronary artery disease)     Cataract     CHF (congestive heart failure) (HCC)     Glaucoma     Hyperlipidemia     Moderate aortic insufficiency     Prediabetes     Severe aortic stenosis     Sinus bradycardia     Umbilical hernia      Past Surgical History  Past Surgical History:   Procedure Laterality Date    ARTHROSCOPY KNEE Right     BUNIONECTOMY Right     CARDIAC CATHETERIZATION Bilateral 2/14/2022    Procedure: Cardiac catheterization;  Surgeon: Fabio Moody MD;  [facility]; Service: Cardiology    HEMORRHOID SURGERY      SHOULDER SURGERY  2012    TONSILLECTOMY      childhood           02/23/22 1137   OT Last Visit   OT Visit Date 02/23/22   Note Type   Note type Evaluation   Restrictions/Precautions   Weight Bearing Precautions Per Order No   Other Precautions Telemetry;Cardiac/sternal   Pain Assessment   Pain Assessment Tool 0-10   Pain Score No Pain   Home Living   Type of Home House   Home Layout Two level;Stairs to enter with rails;Bed/bath upstairs  (bi-level, 2 CHARANJIT, 6+7 steps once inside)   Bathroom Shower/Tub Walk-in shower   Bathroom Toilet Standard   Bathroom Equipment Grab bars in [facility] Other (Comment)  (none)   Additional Comments Pt lives in a bi-level home with 2 CHARANJIT w rails, and 6+7 steps once inside  Pt did not use DME PTA     Prior Function   Level of [place] Independent with ADLs and functional mobility   Lives With Spouse   Receives Help From [facility] ADL Assistance Independent   IADLs Independent   Falls in the last 6 months 0   Vocational Retired   Lifestyle   Autonomy Pt was I in ADLs, IADLs, and did not use DME PTA  (+)   Reciprocal Relationships Pt lives with his wife who is able to A if needed  Service 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  Intrinsic Gratification Pt enjoys fishing and hunting  Psychosocial   Psychosocial (WDL) WDL   ADL   Where Assessed Chair   Eating Assistance 5  Supervision/Setup   Grooming Assistance 5  Supervision/Setup   UB Bathing Assistance 5  Supervision/Setup   LB Bathing Assistance 5  Supervision/Setup   UB Dressing Assistance 5  Supervision/Setup   LB Dressing Assistance 5  Supervision/Setup   Toileting Assistance  5  Supervision/Setup   Functional Assistance 5  Supervision/Setup   Bed Mobility   Additional Comments unable to assess, pt was in chair at the start of session  Transfers   Sit to Stand 6  Modified independent   Stand to Sit 6  Modified independent   Additional Comments pt did not use AD to tx  Pt was left in chair at the end of session, with all items within reach  Functional Mobility   Functional Mobility 6  Modified independent   Additional Comments Pt was able to demonstrate household mobility with no AD  Balance   Static Sitting Good   Dynamic Sitting Good   Static Standing Fair +   Dynamic Standing Fair +   Ambulatory Fair   Activity Tolerance   Activity Tolerance Patient tolerated treatment well   Medical Staff Made Aware Pt was seen with PT 2* medical complexity  Nurse Made Aware RN was made aware  Cognition   Overall Cognitive Status WFL   Arousal/Participation Alert; Responsive;Arousable; Cooperative   Attention Within functional limits   Orientation Level Oriented X4   Memory Within functional limits   Following Commands Follows all commands and directions without difficulty   Comments Pt was pleasant and cooperative t/o session   Pt was educated and receptive to cardiac precautions  Assessment   Assessment Pt is 77 y o  male admitted to [facility] on 2/22/2022 w/ symptomatic aortic stenosis  Pt received a transcatheter aortic valve replacement on 2/22/2022  Pt  has a past medical history of Aneurysm of abdominal vessel ([facility] [address] ), CAD (coronary artery disease), Cataract, CHF (congestive heart failure) ([facility] [facility] ), Glaucoma, Hyperlipidemia, Moderate aortic insufficiency, Prediabetes, Severe aortic stenosis, Sinus bradycardia, and Umbilical hernia    Pt with active OT orders and activity orders  Pt resides in a bi-level Home, with spouse  Pt has 2 CHARANJIT with rails, and 6 + 7 steps once inside  Pt stated wife is able to A if needed  Pt was I w  ADLs, IADLs, (+) drove  Pt is currently functioning at S for UB and LB ADLs, and mod I for transfers and mobility  Pt does not require further acute OT services while in the hospital  OT recommendation for d/c includes home with increased social support  Pt would benefit from practice with ADLs and mobility with staff until d/c      Recommendation   OT Discharge Recommendation No rehabilitation needs  (home with increased social support)   OT - OK to Discharge Yes  (when medically appropriate)   AM-PAC Daily Activity Inpatient   Lower Body Dressing 4   Bathing 4   Toileting 4   Upper Body Dressing 4   Grooming 4   Eating 4   Daily Activity Raw Score 24   Daily Activity Standardized Score (Calc for Raw Score >=11) 57 54   AM-PAC Applied Cognition Inpatient   Following a Speech/Presentation 4   Understanding Ordinary Conversation 4   Taking Medications 4   Remembering Where Things Are Placed or Put Away 4   Remembering List of 4-5 Errands 4   Taking Care of Complicated Tasks 4   Applied Cognition Raw Score 24   Applied Cognition Standardized Score 62 21   Modified Eri Scale   Modified Eri Scale 2       KENAN Hamilton

## 2022-02-23 NOTE — CONSULTS
Consultation - Geriatrics   Mike Angeles 77 y o  male MRN: [id number]  [facility] Encounter: [id number]      Assessment/Plan  Aortic stenosis  S/p TAVR  CT surgery managing    Frailty   mild  Risk factors: hospitalization, polypharmacy  PT/OT  Albumin 4 0 on 2/2/2021, maintain protein in diet  Keep physically, mentally and socially active    Cognitive screening  No reported memory loss  TSH 1 040 on 10/27/2021  Recommend Vitamin B12  Keep physically, mentally and socially active  Recommend Mediterranean diet    Fall prevention  Recommend fall prevention/ balance training such as Matter of Balance or [place] [employer] or yoga  Wear non-slip footwear when ambulating  Provided written education from [facility] GIV/Steadi    Insomnia  Related to hospitalization  Reports sleeping 5 hours per night  Avoid alcohol late afternoon and near bedtime  Avoid caffeine late afternoon and near bedtime  Avoid afternoon naps    Delirium precautions  Avoid deliriogenic medications such as tramadol, benzodiazepines, anticholinergics,  Benadryl  Treat pain, See geriatric pain protocol  Monitor for constipation and urinary retention  Encourage early and frequent moblization, OOB  Encourage Hydration/ Nutrition  Implement sleep hygiene, limit night time interuptions, group activities    Advanced care planning  Full code         History of Present Illness   Physician Requesting Consult: Alicia Wise MD  Reason for Consult / Principal Problem: aortic stenosis  Hx and PE limited by: NA  HPI: Mike Angeles is a 77y o  year old male who presents with aortic stenosis  Patient is s/p transcatheter aortic valve replacement x 1 day  Past medical history aortic stenosis, hyperlipidemia, obesity, arthroscopy right knee, CHF, CAD, glaucoma    Prior to arrival patient resides with wife, drives, wears eyeglasses, has own teeth, independent with IADLS and ADLS, ambulates independently, walks 3-4 miles daily   Reports eating salads, fruit and no fast food  Sleeps approximately 5 hours per night  Denies urinary incontinence, constipation, hx of falls  Upon exam patient sitting in chair, watching TV  Alert and oriented x 3  Denies any complaints  Inpatient consult to Gerontology  Consult performed by: CLAUDIO Gonzalez  Consult ordered by: Niranjan Bingham PA-C          Review of Systems   Constitutional: Negative for activity change and appetite change  HENT: Negative for hearing loss  Eyes: Negative for discharge  Respiratory: Negative for shortness of breath  Gastrointestinal: Negative for abdominal distention, abdominal pain and constipation  Genitourinary: Negative for difficulty urinating  Skin: Negative for color change  Neurological: Negative for weakness  Psychiatric/Behavioral: Positive for sleep disturbance  Historical Information   Past Medical History:   Diagnosis Date    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, 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    CAD (coronary artery disease)     Cataract     CHF (congestive heart failure) (HCC)     Glaucoma     Hyperlipidemia     Moderate aortic insufficiency     Prediabetes     Severe aortic stenosis     Sinus bradycardia     Umbilical hernia      Past Surgical History:   Procedure Laterality Date    ARTHROSCOPY KNEE Right     BUNIONECTOMY Right     CARDIAC CATHETERIZATION Bilateral 2/14/2022    Procedure: Cardiac catheterization;  Surgeon: Pati Birch MD;  [facility];   Service: Cardiology    HEMORRHOID SURGERY      SHOULDER SURGERY  2012    TONSILLECTOMY      childhood     Social History   Social History     Substance and Sexual Activity   Alcohol Use Yes    Alcohol/week: 2 0 standard drinks    Types: 2 Cans of beer per week    Comment: twice a wk      Social History     Substance and Sexual Activity   Drug Use No     Social History     Tobacco Use   Smoking Status Never Smoker   Smokeless Tobacco Never Used         Family History: Family History   Problem Relation Age of Onset    Colon cancer Mother     Other Father         heart problem    Parkinsonism Family        Meds/Allergies   Current meds:   Current Facility-Administered Medications   Medication Dose Route Frequency    acetaminophen (TYLENOL) rectal suppository 650 mg  650 mg Rectal Q4H PRN    acetaminophen (TYLENOL) tablet 650 mg  650 mg Oral Q4H PRN    aspirin chewable tablet 81 mg  81 mg Oral Daily    bimatoprost (LUMIGAN) 0 01 % ophthalmic solution 1 drop  1 drop Both Eyes HS    bisacodyl (DULCOLAX) rectal suppository 10 mg  10 mg Rectal Daily PRN    ceFAZolin (ANCEF) IVPB (premix in dextrose) 2,000 mg 50 mL  2,000 mg Intravenous Q8H    chlorhexidine (PERIDEX) 0 12 % oral rinse 15 mL  15 mL Mouth/Throat BID    clopidogrel (PLAVIX) tablet 75 mg  75 mg Oral Daily    docusate sodium (COLACE) capsule 100 mg  100 mg Oral BID    heparin (porcine) subcutaneous injection 5,000 Units  5,000 Units Subcutaneous Q8H [address]    insulin lispro (HumaLOG) 100 units/mL subcutaneous injection 1-6 Units  1-6 Units Subcutaneous TID AC    insulin lispro (HumaLOG) 100 units/mL subcutaneous injection 1-6 Units  1-6 Units Subcutaneous HS    melatonin tablet 3 mg  3 mg Oral HS PRN    metoprolol tartrate (LOPRESSOR) partial tablet 12 5 mg  12 5 mg Oral Q12H FLAQUITO    mupirocin (BACTROBAN) 2 % nasal ointment 1 application  1 application Nasal F13M FLAQUITO    niCARdipine (CARDENE) 25 mg (STANDARD CONCENTRATION) in sodium chloride 0 9% 250 mL  1-15 mg/hr Intravenous Titrated    ondansetron (ZOFRAN) injection 4 mg  4 mg Intravenous Q6H PRN    pantoprazole (PROTONIX) EC tablet 40 mg  40 mg Oral Daily Before Breakfast    polyethylene glycol (MIRALAX) packet 17 g  17 g Oral Daily    potassium chloride (K-DUR,KLOR-CON) CR tablet 10 mEq  10 mEq Oral Daily    pravastatin (PRAVACHOL) tablet 20 mg  20 mg Oral Daily With Dinner    torsemide (DEMADEX) tablet 10 mg  10 mg Oral Daily      Current PTA meds:  Medications Prior to Admission   Medication    Lumigan 0 01 % ophthalmic drops    mupirocin (BACTROBAN) 2 % nasal ointment    simvastatin (ZOCOR) 10 mg tablet        No Known Allergies    Objective   Vitals: Blood pressure 112/55, pulse 93, temperature 99 °F (37 2 °C), temperature source Oral, resp  rate 17, height 5' 9" (1 753 m), weight 91 7 kg (202 lb 2 6 oz), SpO2 96 %  ,Body mass index is 29 85 kg/m²  Physical Exam  Vitals and nursing note reviewed  Constitutional:       General: He is not in acute distress  HENT:      Head: Normocephalic  Nose: Nose normal       Mouth/Throat:      Mouth: Mucous membranes are moist    Eyes:      General:         Right eye: No discharge  Left eye: No discharge  Cardiovascular:      Rate and Rhythm: Normal rate  Pulses: Normal pulses  Pulmonary:      Effort: Pulmonary effort is normal    Abdominal:      General: Bowel sounds are normal       Palpations: Abdomen is soft  Tenderness: There is no abdominal tenderness  Comments: Round, obsese   Musculoskeletal:         General: Normal range of motion  Skin:     General: Skin is warm and dry  Neurological:      General: No focal deficit present  Mental Status: He is alert and oriented to person, place, and time  Psychiatric:         Mood and Affect: Mood normal          Lab Results:   Results from last 7 days   Lab Units 02/23/22  0505   WBC Thousand/uL 10 90*   HEMOGLOBIN g/dL 12 9   HEMATOCRIT % 38 5   PLATELETS Thousands/uL 164        Results from last 7 days   Lab Units 02/23/22  0505 02/22/22  1050 02/22/22  1039   POTASSIUM mmol/L 3 9   < >  --    CHLORIDE mmol/L 105   < >  --    CO2 mmol/L 26   < >  --    CO2, I-STAT mmol/L  --   --  24   BUN mg/dL 16   < >  --    CREATININE mg/dL 1 11   < >  --    CALCIUM mg/dL 9 4   < >  --    GLUCOSE, ISTAT mg/dl  --   --  99    < > = values in this interval not displayed         Imaging Studies: I have personally reviewed pertinent reports  EKG, Pathology, and Other Studies: I have personally reviewed pertinent reports      VTE Prophylaxis: Sequential compression device (Venodyne)     Code Status: Level 1 - Full Code

## 2022-02-23 NOTE — DISCHARGE SUMMARY
Discharge Summary - Cardiothoracic Surgery   Ashley Ny 77 y o  male MRN: [id number]  Unit/Bed#: [facility] 421-01 Encounter: [id number]    Admission Date: 2/22/2022     Discharge Date: 02/23/22    Admitting Diagnosis: Nonrheumatic aortic valve stenosis [I35 0]    Primary Discharge Diagnosis:   Aortic stenosis, Non-Rheumatic  S/P transfemoral transcatheter aortic valve replacement;    Secondary Discharge Diagnosis:   mod AI, pre-DM2, CAD, CHF, cataracts, glaucoma, sinus bradycardia, umbilical hernia, hypogastric artery aneurysm (12mm)     Attending: SANDHYA Walker  Consulting Physician(s):   Cardiology  Geriatrics     Procedures Performed:   Procedure(s):  REPLACEMENT AORTIC VALVE TRANSCATHETER (TAVR) TRANSFEMORAL W/ 29MM SARKAR PAULO S3 ULTRA VALVE(ACCESS ON LEFT)  TRANSESOPHAGEAL ECHOCARDIOGRAM (ELISSA)  CARDIAC TAVR     Hospital Course:   2/22: Elective admission for TAVR #29mm via L approach  Trace PVL on intra-op ELISSA s/p valve deployment  Extubated and transferred to PACU supported with no gtts  DP pulses 2+ b/l  EKG shows NSR, started on Lopressor 12 5mg BID (new)  CXR w/ mild PVC, started on Torsemide 10mg QDay - TLC crossing midline on CXR, CT chest confirms venous placment  Gerontology and cardiology consulted    2/23: Stable for discharge to home  NSR on RA  2+ DP pulses  Labs stable      Condition at Discharge:   good     Discharge Physical Exam:    Please see the documented physical exam from this morning's progress note for details      Discharge Data:  Results from last 7 days   Lab Units 02/23/22  0505 02/22/22  1050 02/22/22  1039   WBC Thousand/uL 10 90*  --   --    HEMOGLOBIN g/dL 12 9 12 8  --    I STAT HEMOGLOBIN g/dl  --   --  10 2*   HEMATOCRIT % 38 5 39 5  --    HEMATOCRIT, ISTAT %  --   --  30*   PLATELETS Thousands/uL 164 199  --      Results from last 7 days   Lab Units 02/23/22  0505 02/22/22  1050 02/22/22  1039   POTASSIUM mmol/L 3 9 4 5  --    CHLORIDE mmol/L 105 113*  -- CO2 mmol/L 26 23  --    CO2, I-STAT mmol/L  --   --  24   BUN mg/dL 16 19  --    CREATININE mg/dL 1 11 0 93  --    GLUCOSE, ISTAT mg/dl  --   --  99   CALCIUM mg/dL 9 4 9 3  --            Discharge instructions/Information to patient and family:   See after visit summary for information provided to patient and family  Mike Angeles was educated on restrictions regarding driving and lifting, and techniques of proper incisional care  They were specifically counselled on signs and symptoms of an incisional infection, and advised to contact our service immediately should they develop fevers, sweats, chill, redness or drainage at the site of any incisions  Provisions for Follow-Up Care:  See after visit summary for information related to follow-up care and any pertinent home health orders  Disposition:  Home    Planned Readmission:   No    Discharge Medications:  See after visit summary for reconciled discharge medications provided to patient and family  Mike Angeles was provided contact information and scheduled a follow up appointment with SANDHYA Grayson  Additionally, follow up appointments have been scheduled for their primary care physician and primary cardiologist   Contact information was provided  Mike Angeles was counseled on the importance of avoiding tobacco products  As with all patients whom have undergone open heart surgery, tobacco cessation medication was contraindicated at the time of discharge  ACE/ARB was Contraindicated secondary to hypotension    Beta Blocker was Prescribed at discharge      The patient was discharged on ongoing diuretic therapy with Torsemide 10 mg, PO QD and Potassium Chloride 10 mEq, PO QD  They were advised to continue these medications for 5 days, unless otherwise directed  The patient was informed that following their postoperative surgical evaluation, they will be referred to outpatient cardiac rehabilitation    They were counseled that this program is run by specialists who will help them safely strengthen their heart and prevent more heart disease  Cardiac rehabilitation will include exercise, relaxation, stress management, and heart-healthy nutrition  Caregivers will also check to make sure their medication regimen is working  During this admission, the patient was questioned on their use of tobacco, alcohol, and illicit/non-prescription drug use in the  previous 24 months  Maria Lbrittany Bynum states that they have not used any of these substances in this time frame  I spent 30 minutes discharging the patient  This time was spent on the day of discharge  I had direct contact with the patient on the day of discharge  Additional documentation is required if more than 30 minutes were spent on discharge       SIGNATURE: Malathi Tellez  DATE: February 23, 2022  TIME: 1:33 PM

## 2022-02-23 NOTE — PHYSICAL THERAPY NOTE
Physical Therapy Evaluation    Patient's Name: Lori Naranjo    Admitting Diagnosis  Nonrheumatic aortic valve stenosis [I35 0]    Problem List  Patient Active Problem List   Diagnosis    Cataract    Hyperlipidemia    Umbilical hernia without obstruction and without gangrene    Prediabetes    Severe aortic stenosis    Rectus diastasis    Obesity (BMI 30 0-34  9)    Laceration of right palm    Encounter for pre-operative laboratory testing    S/P TAVR (transcatheter aortic valve replacement)    Hyperchloremia    CAD (coronary artery disease)    CHF (congestive heart failure) (HCC)       Past Medical History  Past Medical History:   Diagnosis Date    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, 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    CAD (coronary artery disease)     Cataract     CHF (congestive heart failure) (HCC)     Glaucoma     Hyperlipidemia     Moderate aortic insufficiency     Prediabetes     Severe aortic stenosis     Sinus bradycardia     Umbilical hernia        Past Surgical History  Past Surgical History:   Procedure Laterality Date    ARTHROSCOPY KNEE Right     BUNIONECTOMY Right     CARDIAC CATHETERIZATION Bilateral 2/14/2022    Procedure: Cardiac catheterization;  Surgeon: Fabio Moody MD;  [facility]; Service: Cardiology    HEMORRHOID SURGERY      SHOULDER SURGERY  2012    TONSILLECTOMY      childhood        02/23/22 1141   PT Last Visit   PT Visit Date 02/23/22   Note Type   Note type Evaluation   Pain Assessment   Pain Assessment Tool 0-10   Pain Score No Pain   Restrictions/Precautions   Weight Bearing Precautions Per Order No   Other Precautions Cardiac/sternal;Telemetry   Home Living   Type of [address] Two level;Stairs to enter with rails  (bi-level)   Home Equipment   (denies)   Additional Comments Pt lives in a bi-level home with 2 CHARANJIT and an additional 6+7 once inside   Pt was ambulating independently without AD PTA   Prior Function   Level of [place] Independent with ADLs and functional mobility   Lives With Spouse   Receives Help From Family   ADL Assistance Independent   IADLs Independent   Falls in the last 6 months 0   Vocational Retired   Cognition   Overall Cognitive Status WFL   Attention Within functional limits   Orientation Level Oriented X4   Memory Within functional limits   Following Commands Follows all commands and directions without difficulty   RLE Assessment   RLE Assessment WFL   LLE Assessment   LLE Assessment WFL   Transfers   Sit to Stand 6  Modified independent   Stand to Sit 6  Modified independent   Additional Comments transfers without AD   Ambulation/Elevation   Gait pattern Step through pattern   Gait Assistance 6  Modified independent   Assistive Device None   Distance 280ft    Stair Management Assistance 5  Supervision   Additional items Verbal cues  (for speed/safety)   Stair Management Technique Foreward  (occasional use of LHR)   Number of Stairs 7   Balance   Static Sitting Good   Dynamic Sitting Good   Static Standing Fair +   Dynamic Standing Fair +   Ambulatory Fair   Activity Tolerance   Activity Tolerance Patient tolerated treatment well   Medical Staff Made Aware Seen with OT 2* pt's medical complexity and multiple comorbidities  PT focus on functional transfers, gait, and endurance   Nurse Made Aware ok to see per RN   Assessment   Prognosis Good   Assessment Pt is a 77 y o  male seen for PT evaluation s/p admit to [facility] on 2/22/2022  Pt was admitted with a primary dx of: aortic stenosis  Pt is POD#1 s/p TAVR  PT now consulted for assessment of mobility and d/c needs  Pt with Up with assistance, Ambulate, PT evaluation orders  Pts current comorbidities effecting treatment include: aneurysm of abdominal vessel, CAD, cataract, CHF, glaucoma, HLD, prediabetes, sinus bradycardia, umbilical hernia   Pts current clinical presentation is Evolving (medium complexity) due to Ongoing medical management for primary dx, Ongoing telemetry monitoring, Trending lab values, s/p surgical intervention  Prior to admission, pt was I with ADLs and ambulating without AD  Pt lives with his wife in a bi-level home with 2 CHARANJIT and an additional 6+7 steps once inside  Upon evaluation, pt currently is at mod (I) level for transfers; mod (I) level for ambulation 280 ft w/ no AD; and mod (I) for navigating 7 steps up/down with occasional use of HR  Pt presents at PT eval functioning at/near baseline mobility level  No acute PT needs identified, PT signing off  Pt denies any concerns about returning home upon d/c  At conclusion of PT session pt returned back in chair with phone and call bell within reach  Pt denies any further questions at this time  The patient's AM-PAC Basic Mobility Inpatient Short Form Raw Score is 22  A Raw score of greater than 16 suggests the patient may benefit from discharge to home  Please also refer to the recommendation of the Physical Therapist for safe discharge planning  Recommend home with no needs upon hospital D/C  Barriers to Discharge None   Goals   Patient Goals to go home   Plan   PT Frequency Other (Comment)  (1x visit, D/C PT)   Recommendation   PT Discharge Recommendation No rehabilitation needs   AM-PAC Basic Mobility Inpatient   Turning in Bed Without Bedrails 4   Lying on Back to Sitting on Edge of Flat Bed 4   Moving Bed to Chair 4   Standing Up From Chair 4   Walk in Room 3   Climb 3-5 Stairs 3   Basic Mobility Inpatient Raw Score 22   Basic Mobility Standardized Score 47 4   Highest Level Of Mobility   JH-HLM Goal 7: Walk 25 feet or more   JH-HLM Highest Level of Mobility 8: Walk 250 feet ot more   JH-HLM Goal Achieved Yes   Modified Eri Scale   Modified Eri Scale 2   End of Consult   Patient Position at End of Consult Bedside chair; All needs within reach       David Arciniega, PT, DPT

## 2022-03-01 ENCOUNTER — TELEPHONE (OUTPATIENT)
Dept: CARDIAC SURGERY | Facility: CLINIC | Age: 77
End: 2022-03-01

## 2022-03-01 NOTE — TELEPHONE ENCOUNTER
POST OP PHONE CALL    PROCEDURE:TAVR  DISCHARGE DATE: 2/23      PRE OP WT: 199lb  DISCHARGE WT: 202lb    ACTIVITY: Active      SOB:Denies      USING INCENTIVE SPIROMETRY:Yes      CHEST PAIN: Denies      LIGHTHEADEDNESS: Denies      FEVER/CHILLS: Denies      INCISIONS: Healing well      EDEMA: Denies      GI ISSUES: Denies      MEDICATIONS REVIEWED      QUESTIONS ANSWERED      APPTS REVIEWED

## 2022-03-02 ENCOUNTER — OFFICE VISIT (OUTPATIENT)
Dept: FAMILY MEDICINE CLINIC | Facility: HOSPITAL | Age: 77
End: 2022-03-02
Payer: MEDICARE

## 2022-03-02 VITALS
SYSTOLIC BLOOD PRESSURE: 120 MMHG | TEMPERATURE: 97.8 F | BODY MASS INDEX: 29.74 KG/M2 | WEIGHT: 200.8 LBS | HEART RATE: 74 BPM | HEIGHT: 69 IN | OXYGEN SATURATION: 98 % | DIASTOLIC BLOOD PRESSURE: 70 MMHG

## 2022-03-02 DIAGNOSIS — I50.9 CONGESTIVE HEART FAILURE, UNSPECIFIED HF CHRONICITY, UNSPECIFIED HEART FAILURE TYPE (HCC): ICD-10-CM

## 2022-03-02 DIAGNOSIS — Z95.2 S/P TAVR (TRANSCATHETER AORTIC VALVE REPLACEMENT): ICD-10-CM

## 2022-03-02 DIAGNOSIS — I35.0 SEVERE AORTIC STENOSIS: ICD-10-CM

## 2022-03-02 DIAGNOSIS — E78.5 HYPERLIPIDEMIA, UNSPECIFIED HYPERLIPIDEMIA TYPE: Primary | ICD-10-CM

## 2022-03-02 PROCEDURE — 99496 TRANSJ CARE MGMT HIGH F2F 7D: CPT | Performed by: FAMILY MEDICINE

## 2022-03-02 NOTE — PROGRESS NOTES
Assessment/Plan:      Problem List Items Addressed This Visit        Cardiovascular and Mediastinum    CHF (congestive heart failure) (HCC)    Severe aortic stenosis       Other    Hyperlipidemia - Primary    S/P TAVR (transcatheter aortic valve replacement)           Plan/Discussion:  Patient doing well post op  He has fu with ct surgery  No med changes made  Fu in 6 months and as needed  Subjective:   Chief Complaint   Patient presents with    Transition of Care Management        Patient ID: Gaurav Devine is a 77 y o  male  Pt seen for tcm  Reviewed hospital dc summary  Reviewed consultations  Pt with severe AS  Underwent elective TAVR  Reports overally doing well  No chest pain, no lightheadedness, no dizziness  No sob, no coughing  No issues with medication  The following portions of the patient's history were reviewed and updated as appropriate: allergies, current medications, past family history, past medical history, past social history, past surgical history and problem list     Review of Systems   Constitutional: Negative  Negative for activity change, appetite change, chills and diaphoresis  HENT: Negative for congestion and dental problem  Respiratory: Negative  Negative for apnea, chest tightness, shortness of breath and wheezing  Cardiovascular: Negative  Negative for chest pain, palpitations and leg swelling  Gastrointestinal: Negative  Negative for abdominal distention, abdominal pain, constipation, diarrhea and nausea  Genitourinary: Negative  Negative for difficulty urinating, dysuria and frequency           Objective:  Vitals:    03/02/22 1122   BP: 120/70   BP Location: Left arm   Patient Position: Sitting   Cuff Size: Large   Pulse: 74   Temp: 97 8 °F (36 6 °C)   TempSrc: Tympanic   SpO2: 98%   Weight: 91 1 kg (200 lb 12 8 oz)   Height: 5' 9" (1 753 m)     BP Readings from Last 6 Encounters:   03/02/22 120/70   02/23/22 134/63   02/14/22 122/54   02/02/22 152/69   01/14/22 158/68   12/02/21 118/80      Wt Readings from Last 6 Encounters:   03/02/22 91 1 kg (200 lb 12 8 oz)   02/23/22 91 7 kg (202 lb 2 6 oz)   02/14/22 90 7 kg (200 lb)   02/02/22 93 kg (205 lb)   01/14/22 92 5 kg (204 lb)   12/02/21 92 1 kg (203 lb)             Physical Exam  Vitals and nursing note reviewed  Constitutional:       Appearance: Normal appearance  HENT:      Head: Normocephalic  Eyes:      Extraocular Movements: Extraocular movements intact  Pupils: Pupils are equal, round, and reactive to light  Cardiovascular:      Rate and Rhythm: Normal rate and regular rhythm  Heart sounds: Normal heart sounds  Pulmonary:      Effort: Pulmonary effort is normal       Breath sounds: Normal breath sounds  Abdominal:      General: Bowel sounds are normal       Palpations: Abdomen is soft  Musculoskeletal:      Cervical back: Neck supple  Skin:     General: Skin is warm  Capillary Refill: Capillary refill takes less than 2 seconds  Neurological:      General: No focal deficit present  Mental Status: He is alert and oriented to person, place, and time     Psychiatric:         Mood and Affect: Mood normal          Behavior: Behavior normal

## 2022-03-05 NOTE — PROGRESS NOTES
Cardiology  Heart Failure   Follow Up Office Visit Note -     Maria L Bynum   77 y o    male   MRN: [id number]  [address] [place] [address] [phone]    PCP: Reji Dean MD  Cardiologist : Dr Jen Marrero          Summary of Recommendations  Low-sodium diet, Heart failure education as below  Home blood pressure monitoring  He is going to call us in a week with blood pressures  He tells me today it is up a little bit because he had an appointment with his   Today by me 142/78  Continue to monitor  SBE prophylaxis  Cardiac rehab has been prescribed  He is interested in partaking; he is anxious to lose some weight  Follow up will be scheduled with Dr Beny Zhou [email or web address]         Impression/plan  Severe symptomatic aortic stenosis, S/P #29mm Pan SapienTAVR (TF approach) , adm -22   -Post-TAVR ELISSA  Well-positioned bioprosthetic transcatheter aortic valve with normal function and trace perivalvular leak  EF 60%, normal LV and RV systolic function  Trace MR and TR   -On ASA, Plavix,statin  -EKG today:  Normal sinus rhythm 64 beats per minute  First-degree AV block, GA interval 218 millisecond  Chronic diastolic heart failure  --beta-blocker:   metoprolol tartrate 12 5 mg q 12-new  --Diuretic:   currently none  He finished up his 5 day course of torsemide  --2 g sodium diet, 1800 cc fluid restriction  Daily weights, call weight gain 2-3 lb in 1 day or 5 lb in 5 days  Hypertension, essential  /80  On metoprolol tartrate 12 5 mg q 12,, new since his TAVR  Hyperlipidemia  On simvastatin 10 mg  10/27/2021 - , non HDL  Pre diabetes  Cardiac testing  · TTE 21  EF 55%  Grade 1 DD  Moderate to severe AS (peak velocity 3 5 m/sec, mean gradient 30 mm Hg, aortic valve area 1 0 cm2 by continuity equation)  Mild AI  Mild left atrial enlargement    PASP 36 mm Hg, mildly increased   cardiac catheterization 2/14/22: Minor luminal irregularities   TTE 2/22/22  EF 65%  Grade 1 DD  Mild left atrial enlargement  There is an Pan TATUM 3 29 mm TAVR bioprosthetic valve  The prosthetic valve appears to be functioning normally  The aortic valve velocity is increased due to increased flow  The mean gradient is 22 mmHg and the calculated MEHUL= 1 7                HPI:   Catia Gan is a 76 yo male with known aortic stenosis, hyperlipidemia prediabetes and obesity  Recently he was found have severe, worsening symptomatic aortic stenosis for which he was referred to CT surgery for candidacy of TAVR  A transthoracic echocardiogram December 2021 showed moderate severe AS, AVA0  9 cm2 with mean gradient 33 mmHg  He follows regularly with Dr Antony Oliva in the office  Adm 2/22-2/23/22  ElectiveTF TAVR with #29 Pan Tatum valve     Pre procedure left heart catheterization demonstrated minor luminal irregularities  Postprocedure he had hypertension requiring treatment with IV Cardene; metoprolol was added by CT surgery  Discharge weight : 199lb  Discharge creatinine:  1 11  Discharge diuretics: Torsemide 10 mg daily x5 days with supplemental potassium 10 mEq daily  Not previously on diuretics    3/8/22  Hospital follow-up  He tells me he is doing well  He does not feel chest discomfort  No further dyspnea  He finished his 10 day course of diuretic  His blood pressure is up a little bit today 142/78 by me  Reports he had an appointment with a  today  When he checks it at home it is satisfactory  I recommend he continue to check it and he was advised to call us in 1 week with the blood pressure numbers  Goal less than 130/80  I recommend SBE prophylaxis prior to dental work including cleaning  He requested to drive  His EKG shows normal sinus rhythm  He feels well without palpitations lightheadedness or dizziness    He was cleared to drive as prior to his surgery           I have spent 25 minutes with Patient  today in which greater than 50% of this time was spent in counseling/coordination of care regarding Intructions for management, Patient and family education, Importance of tx compliance and Risk factor reductions  Assessment  Diagnoses and all orders for this visit:    Hospital discharge follow-up    Severe aortic stenosis    S/P TAVR (transcatheter aortic valve replacement)    Coronary artery disease involving native heart without angina pectoris, unspecified vessel or lesion type    Congestive heart failure, unspecified HF chronicity, unspecified heart failure type ([facility] )    Obesity (BMI 30 0-34  9)    Prediabetes    Hyperlipidemia, unspecified hyperlipidemia type        Past Medical History:   Diagnosis Date    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, 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    CAD (coronary artery disease)     Cataract     CHF (congestive heart failure) (HCC)     Glaucoma     Hyperlipidemia     Moderate aortic insufficiency     Prediabetes     Severe aortic stenosis     Sinus bradycardia     Umbilical hernia        Review of Systems   Constitutional: Negative for chills  Cardiovascular: Negative for chest pain, claudication, cyanosis, dyspnea on exertion, irregular heartbeat, leg swelling, near-syncope, orthopnea, palpitations, paroxysmal nocturnal dyspnea and syncope  Respiratory: Negative for cough and shortness of breath  Gastrointestinal: Negative for heartburn and nausea  Neurological: Negative for dizziness, focal weakness, headaches, light-headedness and weakness  All other systems reviewed and are negative  No Known Allergies        Current Outpatient Medications:     acetaminophen (TYLENOL) 325 mg tablet, Take 2 tablets (650 mg total) by mouth every 6 (six) hours as needed for fever (temperature greater than 101 F), Disp: , Rfl: 0    aspirin 81 mg chewable tablet, Chew 1 tablet (81 mg total) daily, Disp: 30 tablet, Rfl: 2    clopidogrel (PLAVIX) 75 mg tablet, Take 1 tablet (75 mg total) by mouth daily, Disp: 90 tablet, Rfl: 0    Lumigan 0 01 % ophthalmic drops, , Disp: , Rfl:     metoprolol tartrate (LOPRESSOR) 25 mg tablet, Take 0 5 tablets (12 5 mg total) by mouth every 12 (twelve) hours, Disp: 30 tablet, Rfl: 2    pantoprazole (PROTONIX) 40 mg tablet, Take 1 tablet (40 mg total) by mouth daily before breakfast, Disp: 30 tablet, Rfl: 0    potassium chloride (K-DUR,KLOR-CON) 10 mEq tablet, Take 1 tablet (10 mEq total) by mouth daily for 5 days, Disp: 5 tablet, Rfl: 1    simvastatin (ZOCOR) 10 mg tablet, Take 1 tablet by mouth once daily, Disp: 90 tablet, Rfl: 0    torsemide (DEMADEX) 10 mg tablet, Take 1 tablet (10 mg total) by mouth daily for 5 days, Disp: 5 tablet, Rfl: 1    Social History     Socioeconomic History    Marital status: /Civil Union     Spouse name: Not on file    Number of children: Not on file    Years of education: Not on file    Highest education level: Not on file   Occupational History    Not on file   Tobacco Use    Smoking status: Never Smoker    Smokeless tobacco: Never Used   Vaping Use    Vaping Use: Never used   Substance and Sexual Activity    Alcohol use: Yes     Alcohol/week: 2 0 standard drinks     Types: 2 Cans of beer per week     Comment: twice a wk     Drug use: No    Sexual activity: Not on file   Other Topics Concern    Not on file   Social History Narrative    Not on file     Social Determinants of Health     Financial Resource Strain: Not on file   Food Insecurity: Not on file   Transportation Needs: Not on file   Physical Activity: Not on file   Stress: Not on file   Social Connections: Not on file   Intimate Partner Violence: Not on file   Housing Stability: Not on file       Family History   Problem Relation Age of Onset    Colon cancer Mother     Other Father         heart problem    Parkinsonism Family        Physical Exam  Vitals and nursing note reviewed  Constitutional:       General: He is not in acute distress  Appearance: He is obese  HENT:      Head: Normocephalic and atraumatic  Eyes:      Conjunctiva/sclera: Conjunctivae normal    Cardiovascular:      Rate and Rhythm: Normal rate and regular rhythm  Pulses: Intact distal pulses  Heart sounds: Normal heart sounds  Pulmonary:      Effort: Pulmonary effort is normal       Breath sounds: Normal breath sounds  Abdominal:      General: Bowel sounds are normal       Palpations: Abdomen is soft  Musculoskeletal:         General: Normal range of motion  Cervical back: Normal range of motion and neck supple  Skin:     General: Skin is warm and dry  Neurological:      Mental Status: He is alert and oriented to person, place, and time  Vitals: There were no vitals taken for this visit  Wt Readings from Last 3 Encounters:   22 91 1 kg (200 lb 12 8 oz)   22 91 7 kg (202 lb 2 6 oz)   22 90 7 kg (200 lb)         Labs & Results:  Lab Results   Component Value Date    WBC 10 90 (H) 2022    HGB 12 9 2022    HCT 38 5 2022     (H) 2022     2022     No results found for: BNP  No components found for: CHEM    Results for orders placed during the hospital encounter of 20    Echo complete with contrast if indicated    [place] [address] [address]    Transthoracic Echocardiogram  2D, M-mode, Doppler, and Color Doppler    Study date:  01-Dec-2020    Patient: Miguelangel Madsen  MR number: [id number]  Account number: [de-identified]  : 1945  Age: 69 years  Gender: Male  Status: Outpatient  Location: [facility] [place]  Height: 69 in  Weight: 198 7 lb  BP:    Indications:  Moderate Aortic Stenosis    Diagnoses: I35 0 - Nonrheumatic aortic (valve) stenosis    Sonographer:  Janak Farias RDCS  Primary Physician:  Carine Delarosa, MD  Referring Physician:  Jose Saul MD  Group:  [id number]  Interpreting Physician:  Kelsey Pickard MD    SUMMARY    LEFT VENTRICLE:  Systolic function was normal  Ejection fraction was estimated to be 60 %  There were no regional wall motion abnormalities  Wall thickness was mildly increased  There was mild concentric hypertrophy  Doppler parameters were consistent with abnormal left ventricular relaxation (grade 1 diastolic dysfunction)  MITRAL VALVE:  There was mild regurgitation  AORTIC VALVE:  The valve was trileaflet  Leaflets exhibited marked calcification and markedly reduced cuspal separation  There was moderate to severe stenosis  There was mild regurgitation  Valve mean gradient was 28 mmHg  Estimated aortic valve area (by VTI) was 0 99 cmï¾²  Estimated aortic valve area (by Vmax) was 0 91 cmï¾²  TRICUSPID VALVE:  There was trace regurgitation  HISTORY: PRIOR HISTORY: Moderate Aortic Stenosis, Hyperlipidemia    PROCEDURE: The study was performed in the [facility]  This was a routine study  The transthoracic approach was used  The study included complete 2D imaging, M-mode, complete spectral Doppler, and color Doppler  Images were obtained from the parasternal, apical, subcostal, and suprasternal notch acoustic windows  Image quality was adequate  LEFT VENTRICLE: Size was normal  Systolic function was normal  Ejection fraction was estimated to be 60 %  There were no regional wall motion abnormalities  Wall thickness was mildly increased  There was mild concentric hypertrophy  DOPPLER: Doppler parameters were consistent with abnormal left ventricular relaxation (grade 1 diastolic dysfunction)      RIGHT VENTRICLE: The size was normal  Systolic function was normal  Wall thickness was normal     LEFT ATRIUM: Size was normal     RIGHT ATRIUM: Size was normal     MITRAL VALVE: Valve structure was normal  There was normal leaflet separation  DOPPLER: The transmitral velocity was within the normal range  There was no evidence for stenosis  There was mild regurgitation  AORTIC VALVE: The valve was trileaflet  Leaflets exhibited marked calcification and markedly reduced cuspal separation  DOPPLER: Transaortic velocity was increased due to valvular stenosis  There was moderate to severe stenosis  There was  mild regurgitation  TRICUSPID VALVE: The valve structure was normal  There was normal leaflet separation  DOPPLER: The transtricuspid velocity was within the normal range  There was no evidence for stenosis  There was trace regurgitation  PULMONIC VALVE: Leaflets exhibited normal thickness, no calcification, and normal cuspal separation  DOPPLER: The transpulmonic velocity was within the normal range  There was no significant regurgitation  PERICARDIUM: There was no pericardial effusion  The pericardium was normal in appearance  AORTA: The root exhibited normal size  SYSTEMIC VEINS: IVC: The inferior vena cava was normal in size  PULMONARY VEINS: DOPPLER: Doppler signals were not recordable in the pulmonary vein(s)      MEASUREMENT TABLES    2D MEASUREMENTS  LVOT   (Reference normals)  Diam   22 mm   (--)    DOPPLER MEASUREMENTS  LVOT   (Reference normals)  Peak maria elena   85 cm/s   (--)  VTI   23 cm   (--)  Stroke vol   87 43 ml   (--)  Aortic valve   (Reference normals)  Peak maria elena   350 cm/s   (--)  VTI   90 cm   (--)  Mean gradient   28 mmHg   (--)  Obstr index, VTI   0 26    (--)  Valve area, VTI   0 99 cmï¾²   (--)  Obstr index, Vmax   0 24    (--)  Valve area, Vmax   0 91 cmï¾²   (--)    SYSTEM MEASUREMENT TABLES    2D  %FS: 32 11 %  Ao Diam: 3 05 cm  Ao asc: 3 26 cm  EDV(Teich): 129 34 ml  EF(Teich): 59 9 %  ESV(Teich): 51 86 ml  IVSd: 1 13 cm  LA Area: 17 44 cm2  LA Diam: 4 04 cm  LVEDV MOD A4C: 116 14 ml  LVEF MOD A4C: 65 11 %  LVESV MOD A4C: 40 53 ml  LVIDd: 5 2 cm  LVIDs: 3 53 cm  LVLd A4C: 7 32 cm  LVLs A4C: 5 94 cm  LVOT Diam: 2 21 cm  LVPWd: 1 24 cm  RA Area: 15 6 cm2  RVIDd: 4 08 cm  SV MOD A4C: 75 62 ml  SV(Teich): 77 48 ml    CW  AV Env  Ti: 343 17 ms  AV VTI: 67 78 cm  AV Vmax: 2 66 m/s  AV Vmean: 1 9 m/s  AV maxP 81 mmHg  AV meanP 4 mmHg    MM  TAPSE: 2 99 cm    PW  MEHUL (VTI): 1 16 cm2  MEHUL Vmax: 1 08 cm2  AVAI (VTI): 0 cm2/m2  AVAI Vmax: 0 cm2/m2  E' Sept: 0 05 m/s  E/E' Sept: 12 28  LVOT Env  Ti: 382 49 ms  LVOT VTI: 20 55 cm  LVOT Vmax: 0 75 m/s  LVOT Vmean: 0 54 m/s  LVOT maxP 26 mmHg  LVOT meanP 31 mmHg  LVSI Dopp: 38 21 ml/m2  LVSV Dopp: 78 72 ml  MV A Dominic: 0 88 m/s  MV Dec [place]: 2 52 m/s2  MV DecT: 243 91 ms  MV E Dominic: 0 61 m/s  MV E/A Ratio: 0 7  MV PHT: 70 73 ms  MVA By PHT: 3 11 cm2    [employer] Accredited Echocardiography Laboratory    Prepared and electronically signed by    Meño Junior MD  Signed 01-Dec-2020 17:11:18    No results found for this or any previous visit  This note was completed in part utilizing m-modal fluency direct voice recognition software  Grammatical errors, random word insertion, spelling mistakes, and incomplete sentences may be an occasional consequence of the system secondary to software limitations, ambient noise and hardware issues  At the time of dictation, efforts were made to edit, clarify and /or correct errors  Please read the chart carefully and recognize, using context, where substitutions have occurred    If you have any questions or concerns about the context, text or information contained within the body of this dictation, please contact myself, the provider, for further clarification

## 2022-03-08 ENCOUNTER — OFFICE VISIT (OUTPATIENT)
Dept: CARDIOLOGY CLINIC | Facility: CLINIC | Age: 77
End: 2022-03-08
Payer: MEDICARE

## 2022-03-08 VITALS
WEIGHT: 204.2 LBS | HEIGHT: 69 IN | DIASTOLIC BLOOD PRESSURE: 76 MMHG | HEART RATE: 68 BPM | SYSTOLIC BLOOD PRESSURE: 142 MMHG | BODY MASS INDEX: 30.24 KG/M2 | OXYGEN SATURATION: 97 %

## 2022-03-08 DIAGNOSIS — I35.0 SEVERE AORTIC STENOSIS: ICD-10-CM

## 2022-03-08 DIAGNOSIS — E78.5 HYPERLIPIDEMIA, UNSPECIFIED HYPERLIPIDEMIA TYPE: ICD-10-CM

## 2022-03-08 DIAGNOSIS — I50.9 CONGESTIVE HEART FAILURE, UNSPECIFIED HF CHRONICITY, UNSPECIFIED HEART FAILURE TYPE (HCC): ICD-10-CM

## 2022-03-08 DIAGNOSIS — Z09 HOSPITAL DISCHARGE FOLLOW-UP: Primary | ICD-10-CM

## 2022-03-08 DIAGNOSIS — E66.9 OBESITY (BMI 30.0-34.9): ICD-10-CM

## 2022-03-08 DIAGNOSIS — Z95.2 S/P TAVR (TRANSCATHETER AORTIC VALVE REPLACEMENT): ICD-10-CM

## 2022-03-08 DIAGNOSIS — I25.10 CORONARY ARTERY DISEASE INVOLVING NATIVE HEART WITHOUT ANGINA PECTORIS, UNSPECIFIED VESSEL OR LESION TYPE: ICD-10-CM

## 2022-03-08 DIAGNOSIS — R73.03 PREDIABETES: ICD-10-CM

## 2022-03-08 PROCEDURE — 99214 OFFICE O/P EST MOD 30 MIN: CPT | Performed by: NURSE PRACTITIONER

## 2022-03-08 PROCEDURE — 93000 ELECTROCARDIOGRAM COMPLETE: CPT | Performed by: NURSE PRACTITIONER

## 2022-03-08 NOTE — PATIENT INSTRUCTIONS
Mediterranean Diet   AMBULATORY CARE:   A Mediterranean diet  is a meal plan that includes foods that are commonly eaten in countries that border the [place]  This meal plan may provide several health benefits  These include losing or maintaining weight, and decreasing blood pressure, blood sugar, and cholesterol levels  It may also help protect against certain health conditions such as heart disease, cancer, type 2 diabetes, and Alzheimer disease  Work with a dietitian to develop a meal plan that is right for you  Foods to include in the [address] [facility] diet:   · Include fruits and vegetables in each meal   Eat a variety of fresh fruits and vegetables  · Choose whole grains every day  These foods include whole-grain breads, pastas, and cereals  It also includes brown rice, quinoa, and millet  · Use unsaturated fats instead of saturated fats  Cook with olive or canola oil  Limit saturated fats, such as butter, margarine, and shortening  Saturated fat is an unhealthy fat that can increase your cholesterol levels  · Choose plant foods, poultry, and fish as your main sources of protein  ? Eat plant-based foods that provide protein,  such as lentils, beans, chickpeas, nuts, and seeds  Choose mostly plant-based foods in place of meat on most days of the week  ? Eat protein foods high in omega-3 fats  Fish high in omega-3 fats include salmon, trout, and tuna  Include these types of fish 1 or 2 times each week  Limit fish high in mercury, such as shark, swordfish, tilefish, and abdi mackerel  Omega-3 fats are also found in walnuts and flaxseed  ? Choose poultry (chicken or turkey)  without skin instead of red meat  Red meat is high in saturated fat  Limit eggs and high-fat meats, such as boothe, sausage, and hot dogs  · Choose low-fat dairy foods  such as nonfat or 1% milk, or low-fat almond, cashew, or soy milk   Other examples include low-fat cheese, yogurt, and cottage cheese  · Limit sweets  Limit your intake of high-sugar foods, such as soda, desserts, and candy  · Talk to your healthcare provider about alcohol  Studies have shown that moderate intake of wine may reduce the risk of heart disease  A moderate amount of wine is 1 serving for women and men 65 years and older each day  Two servings is recommended for men 24to 59years of age each day  A serving of wine is 5 ounces  Other things you need to know if you follow the Mediterranean diet:   · Include foods high in iron and vitamin C   Plant-based foods that are high in iron include spinach, beans, tofu, and artichoke  Eat a serving of vitamin C with any iron-rich food to help your body absorb more iron  Examples include oranges, strawberries, cantaloupe, broccoli, and yellow peppers  · Get regular physical activity  The Mediterranean diet will have the most benefit if you get regular physical activity  Get 30 minutes of physical activity at least 5 days a week  Choose physical activities that increase your heart rate  Examples include walking, hiking, swimming, and riding a bike  Ask your healthcare provider about the best exercise plan for you  © Copyright [employer] 2022 Information is for End User's use only and may not be sold, redistributed or otherwise used for commercial purposes  All illustrations and images included in CareNotes® are the copyrighted property of [employer]  or Elmer Hoffman  The above information is an  only  It is not intended as medical advice for individual conditions or treatments  Talk to your doctor, nurse or pharmacist before following any medical regimen to see if it is safe and effective for you

## 2022-03-08 NOTE — LETTER
2022     Morgan Wright MD  [address]  [address] [facility]  [address] [address] [address]    Patient: Grant Gonzalez   YOB: 1945   Date of Visit: 3/8/2022       Dear Dr Zoe Fernandez: Thank you for referring Tessy Helm to me for evaluation  Below are my notes for this consultation  If you have questions, please do not hesitate to call me  I look forward to following your patient along with you  Sincerely,        CLAUDIO Olmedo        CC: No Recipients  Noa Olmedo  3/8/2022  3:30 PM  Sign when Signing Visit  Cardiology  Heart Failure   Follow Up Office Visit Note -     Grant Gonzalez   77 y o    male   MRN: [id number]  [address]  [place] [id number] [phone]    PCP: Morgan Wright MD  Cardiologist : Dr Zaida Small          Summary of Recommendations  Low-sodium diet, Heart failure education as below  Home blood pressure monitoring  He is going to call us in a week with blood pressures  He tells me today it is up a little bit because he had an appointment with his   Today by me 142/78  Continue to monitor  SBE prophylaxis  Cardiac rehab has been prescribed  He is interested in partaking; he is anxious to lose some weight  Follow up will be scheduled with Dr Brien Frederick screenin2021         Impression/plan  Severe symptomatic aortic stenosis, S/P #29mm Pna SapienTAVR (TF approach) , adm -22   -Post-TAVR ELISSA  Well-positioned bioprosthetic transcatheter aortic valve with normal function and trace perivalvular leak  EF 60%, normal LV and RV systolic function  Trace MR and TR   -On ASA, Plavix,statin  -EKG today:  Normal sinus rhythm 64 beats per minute  First-degree AV block, MA interval 218 millisecond  Chronic diastolic heart failure  --beta-blocker:   metoprolol tartrate 12 5 mg q 12-new  --Diuretic:   currently none    He finished up his 5 day course of torsemide  --2 g sodium diet, 1800 cc fluid restriction  Daily weights, call weight gain 2-3 lb in 1 day or 5 lb in 5 days  Hypertension, essential  /80  On metoprolol tartrate 12 5 mg q 12,, new since his TAVR  Hyperlipidemia  On simvastatin 10 mg  10/27/2021 - , non HDL  Pre diabetes  Cardiac testing  · TTE 12/2/21  EF 55%  Grade 1 DD  Moderate to severe AS (peak velocity 3 5 m/sec, mean gradient 30 mm Hg, aortic valve area 1 0 cm2 by continuity equation)  Mild AI  Mild left atrial enlargement  PASP 36 mm Hg, mildly increased   cardiac catheterization 2/14/22: Minor luminal irregularities   TTE 2/22/22  EF 65%  Grade 1 DD  Mild left atrial enlargement  There is an Pan TATUM 3 29 mm TAVR bioprosthetic valve  The prosthetic valve appears to be functioning normally  The aortic valve velocity is increased due to increased flow  The mean gradient is 22 mmHg and the calculated MEHUL= 1 7                HPI:   Coni Plata is a 78 yo male with known aortic stenosis, hyperlipidemia prediabetes and obesity  Recently he was found have severe, worsening symptomatic aortic stenosis for which he was referred to CT surgery for candidacy of TAVR  A transthoracic echocardiogram December 2021 showed moderate severe AS, AVA0  9 cm2 with mean gradient 33 mmHg  He follows regularly with Dr Lorena Tang in the office  Adm 2/22-2/23/22  ElectiveTF TAVR with #29 Pan Tatum valve     Pre procedure left heart catheterization demonstrated minor luminal irregularities  Postprocedure he had hypertension requiring treatment with IV Cardene; metoprolol was added by CT surgery  Discharge weight : 199lb  Discharge creatinine:  1 11  Discharge diuretics: Torsemide 10 mg daily x5 days with supplemental potassium 10 mEq daily  Not previously on diuretics    3/8/22  Hospital follow-up  He tells me he is doing well  He does not feel chest discomfort  No further dyspnea    He finished his 10 day course of diuretic  His blood pressure is up a little bit today 142/78 by me  Reports he had an appointment with a  today  When he checks it at home it is satisfactory  I recommend he continue to check it and he was advised to call us in 1 week with the blood pressure numbers  Goal less than 130/80  I recommend SBE prophylaxis prior to dental work including cleaning  He requested to drive  His EKG shows normal sinus rhythm  He feels well without palpitations lightheadedness or dizziness  He was cleared to drive as prior to his surgery           I have spent 25 minutes with Patient  today in which greater than 50% of this time was spent in counseling/coordination of care regarding Intructions for management, Patient and family education, Importance of tx compliance and Risk factor reductions  Assessment  Diagnoses and all orders for this visit:    Hospital discharge follow-up    Severe aortic stenosis    S/P TAVR (transcatheter aortic valve replacement)    Coronary artery disease involving native heart without angina pectoris, unspecified vessel or lesion type    Congestive heart failure, unspecified HF chronicity, unspecified heart failure type ([facility] )    Obesity (BMI 30 0-34  9)    Prediabetes    Hyperlipidemia, unspecified hyperlipidemia type        Past Medical History:   Diagnosis Date    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, 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    CAD (coronary artery disease)     Cataract     CHF (congestive heart failure) (HCC)     Glaucoma     Hyperlipidemia     Moderate aortic insufficiency     Prediabetes     Severe aortic stenosis     Sinus bradycardia     Umbilical hernia        Review of Systems   Constitutional: Negative for chills  Cardiovascular: Negative for chest pain, claudication, cyanosis, dyspnea on exertion, irregular heartbeat, leg swelling, near-syncope, orthopnea, palpitations, paroxysmal nocturnal dyspnea and syncope     Respiratory: Negative for cough and shortness of breath  Gastrointestinal: Negative for heartburn and nausea  Neurological: Negative for dizziness, focal weakness, headaches, light-headedness and weakness  All other systems reviewed and are negative  No Known Allergies    Current Outpatient Medications:     acetaminophen (TYLENOL) 325 mg tablet, Take 2 tablets (650 mg total) by mouth every 6 (six) hours as needed for fever (temperature greater than 101 F), Disp: , Rfl: 0    aspirin 81 mg chewable tablet, Chew 1 tablet (81 mg total) daily, Disp: 30 tablet, Rfl: 2    clopidogrel (PLAVIX) 75 mg tablet, Take 1 tablet (75 mg total) by mouth daily, Disp: 90 tablet, Rfl: 0    Lumigan 0 01 % ophthalmic drops, , Disp: , Rfl:     metoprolol tartrate (LOPRESSOR) 25 mg tablet, Take 0 5 tablets (12 5 mg total) by mouth every 12 (twelve) hours, Disp: 30 tablet, Rfl: 2    pantoprazole (PROTONIX) 40 mg tablet, Take 1 tablet (40 mg total) by mouth daily before breakfast, Disp: 30 tablet, Rfl: 0    potassium chloride (K-DUR,KLOR-CON) 10 mEq tablet, Take 1 tablet (10 mEq total) by mouth daily for 5 days, Disp: 5 tablet, Rfl: 1    simvastatin (ZOCOR) 10 mg tablet, Take 1 tablet by mouth once daily, Disp: 90 tablet, Rfl: 0    torsemide (DEMADEX) 10 mg tablet, Take 1 tablet (10 mg total) by mouth daily for 5 days, Disp: 5 tablet, Rfl: 1    Social History     Socioeconomic History    Marital status: /Civil Union     Spouse name: Not on file    Number of children: Not on file    Years of education: Not on file    Highest education level: Not on file   Occupational History    Not on file   Tobacco Use    Smoking status: Never Smoker    Smokeless tobacco: Never Used   Vaping Use    Vaping Use: Never used   Substance and Sexual Activity    Alcohol use:  Yes     Alcohol/week: 2 0 standard drinks     Types: 2 Cans of beer per week     Comment: twice a wk     Drug use: No    Sexual activity: Not on file   Other Topics Concern    Not on file   Social History Narrative    Not on file     Social Determinants of Health     Financial Resource Strain: Not on file   Food Insecurity: Not on file   Transportation Needs: Not on file   Physical Activity: Not on file   Stress: Not on file   Social Connections: Not on file   Intimate Partner Violence: Not on file   Housing Stability: Not on file       Family History   Problem Relation Age of Onset    Colon cancer Mother     Other Father         heart problem    Parkinsonism Family        Physical Exam  Vitals and nursing note reviewed  Constitutional:       General: He is not in acute distress  Appearance: He is obese  HENT:      Head: Normocephalic and atraumatic  Eyes:      Conjunctiva/sclera: Conjunctivae normal    Cardiovascular:      Rate and Rhythm: Normal rate and regular rhythm  Pulses: Intact distal pulses  Heart sounds: Normal heart sounds  Pulmonary:      Effort: Pulmonary effort is normal       Breath sounds: Normal breath sounds  Abdominal:      General: Bowel sounds are normal       Palpations: Abdomen is soft  Musculoskeletal:         General: Normal range of motion  Cervical back: Normal range of motion and neck supple  Skin:     General: Skin is warm and dry  Neurological:      Mental Status: He is alert and oriented to person, place, and time  Vitals: There were no vitals taken for this visit     Wt Readings from Last 3 Encounters:   03/02/22 91 1 kg (200 lb 12 8 oz)   02/23/22 91 7 kg (202 lb 2 6 oz)   02/14/22 90 7 kg (200 lb)         Labs & Results:  Lab Results   Component Value Date    WBC 10 90 (H) 02/23/2022    HGB 12 9 02/23/2022    HCT 38 5 02/23/2022     (H) 02/23/2022     02/23/2022     No results found for: BNP  No components found for: CHEM    Results for orders placed during the hospital encounter of 12/01/20    Echo complete with contrast if indicated    Narrative  [address]  [address] [facility]    Transthoracic Echocardiogram  2D, M-mode, Doppler, and Color Doppler    Study date:  01-Dec-2020    Patient: Ag Nathan  MR number: [id number]  Account number: [de-identified]  : 1945  Age: 69 years  Gender: Male  Status: Outpatient  Location: [facility]  Height: 69 in  Weight: 198 7 lb  BP:    Indications: Moderate Aortic Stenosis    Diagnoses: I35 0 - Nonrheumatic aortic (valve) stenosis    Sonographer:  Jenny Alonso RDCS  Primary Physician:  Lore Polk MD  Referring Physician:  Lore Polk MD  [facility]  [facility]  Interpreting Physician:  Huyen Washburn MD    SUMMARY    LEFT VENTRICLE:  Systolic function was normal  Ejection fraction was estimated to be 60 %  There were no regional wall motion abnormalities  Wall thickness was mildly increased  There was mild concentric hypertrophy  Doppler parameters were consistent with abnormal left ventricular relaxation (grade 1 diastolic dysfunction)  MITRAL VALVE:  There was mild regurgitation  AORTIC VALVE:  The valve was trileaflet  Leaflets exhibited marked calcification and markedly reduced cuspal separation  There was moderate to severe stenosis  There was mild regurgitation  Valve mean gradient was 28 mmHg  Estimated aortic valve area (by VTI) was 0 99 cmï¾²  Estimated aortic valve area (by Vmax) was 0 91 cmï¾²  TRICUSPID VALVE:  There was trace regurgitation  HISTORY: PRIOR HISTORY: Moderate Aortic Stenosis, Hyperlipidemia    PROCEDURE: The study was performed in the [facility]  This was a routine study  The transthoracic approach was used  The study included complete 2D imaging, M-mode, complete spectral Doppler, and color Doppler  Images were obtained from the parasternal, apical, subcostal, and suprasternal notch acoustic windows  Image quality was adequate      LEFT VENTRICLE: Size was normal  Systolic function was normal  Ejection fraction was estimated to be 60 %  There were no regional wall motion abnormalities  Wall thickness was mildly increased  There was mild concentric hypertrophy  DOPPLER: Doppler parameters were consistent with abnormal left ventricular relaxation (grade 1 diastolic dysfunction)  RIGHT VENTRICLE: The size was normal  Systolic function was normal  Wall thickness was normal     LEFT ATRIUM: Size was normal     RIGHT ATRIUM: Size was normal     MITRAL VALVE: Valve structure was normal  There was normal leaflet separation  DOPPLER: The transmitral velocity was within the normal range  There was no evidence for stenosis  There was mild regurgitation  AORTIC VALVE: The valve was trileaflet  Leaflets exhibited marked calcification and markedly reduced cuspal separation  DOPPLER: Transaortic velocity was increased due to valvular stenosis  There was moderate to severe stenosis  There was  mild regurgitation  TRICUSPID VALVE: The valve structure was normal  There was normal leaflet separation  DOPPLER: The transtricuspid velocity was within the normal range  There was no evidence for stenosis  There was trace regurgitation  PULMONIC VALVE: Leaflets exhibited normal thickness, no calcification, and normal cuspal separation  DOPPLER: The transpulmonic velocity was within the normal range  There was no significant regurgitation  PERICARDIUM: There was no pericardial effusion  The pericardium was normal in appearance  AORTA: The root exhibited normal size  SYSTEMIC VEINS: IVC: The inferior vena cava was normal in size  PULMONARY VEINS: DOPPLER: Doppler signals were not recordable in the pulmonary vein(s)      MEASUREMENT TABLES    2D MEASUREMENTS  LVOT   (Reference normals)  Diam   22 mm   (--)    DOPPLER MEASUREMENTS  LVOT   (Reference normals)  Peak maria elena   85 cm/s   (--)  VTI   23 cm   (--)  Stroke vol   87 43 ml   (--)  Aortic valve   (Reference normals)  Peak maria elena 350 cm/s   (--)  VTI   90 cm   (--)  Mean gradient   28 mmHg   (--)  Obstr index, VTI   0 26    (--)  Valve area, VTI   0 99 cmï¾²   (--)  Obstr index, Vmax   0 24    (--)  Valve area, Vmax   0 91 cmï¾²   (--)    SYSTEM MEASUREMENT TABLES    2D  %FS: 32 11 %  Ao Diam: 3 05 cm  Ao asc: 3 26 cm  EDV(Teich): 129 34 ml  EF(Teich): 59 9 %  ESV(Teich): 51 86 ml  IVSd: 1 13 cm  LA Area: 17 44 cm2  LA Diam: 4 04 cm  LVEDV MOD A4C: 116 14 ml  LVEF MOD A4C: 65 11 %  LVESV MOD A4C: 40 53 ml  LVIDd: 5 2 cm  LVIDs: 3 53 cm  LVLd A4C: 7 32 cm  LVLs A4C: 5 94 cm  LVOT Diam: 2 21 cm  LVPWd: 1 24 cm  RA Area: 15 6 cm2  RVIDd: 4 08 cm  SV MOD A4C: 75 62 ml  SV(Teich): 77 48 ml    CW  AV Env  Ti: 343 17 ms  AV VTI: 67 78 cm  AV Vmax: 2 66 m/s  AV Vmean: 1 9 m/s  AV maxP 81 mmHg  AV meanP 4 mmHg    MM  TAPSE: 2 99 cm    PW  MEHUL (VTI): 1 16 cm2  MEHUL Vmax: 1 08 cm2  AVAI (VTI): 0 cm2/m2  AVAI Vmax: 0 cm2/m2  E' Sept: 0 05 m/s  E/E' Sept: 12 28  LVOT Env  Ti: 382 49 ms  LVOT VTI: 20 55 cm  LVOT Vmax: 0 75 m/s  LVOT Vmean: 0 54 m/s  LVOT maxP 26 mmHg  LVOT meanP 31 mmHg  LVSI Dopp: 38 21 ml/m2  LVSV Dopp: 78 72 ml  MV A Dominic: 0 88 m/s  MV Dec [place]: 2 52 m/s2  MV DecT: 243 91 ms  MV E Dominic: 0 61 m/s  MV E/A Ratio: 0 7  MV PHT: 70 73 ms  MVA By PHT: 3 11 cm2    [facility] Accredited Echocardiography Laboratory    Prepared and electronically signed by    Roberto Emerson MD  Signed 01-Dec-2020 17:11:18    No results found for this or any previous visit  This note was completed in part utilizing m-modal fluency direct voice recognition software  Grammatical errors, random word insertion, spelling mistakes, and incomplete sentences may be an occasional consequence of the system secondary to software limitations, ambient noise and hardware issues  At the time of dictation, efforts were made to edit, clarify and /or correct errors    Please read the chart carefully and recognize, using context, where substitutions have occurred    If you have any questions or concerns about the context, text or information contained within the body of this dictation, please contact myself, the provider, for further clarification

## 2022-03-21 ENCOUNTER — TELEPHONE (OUTPATIENT)
Dept: CARDIOLOGY CLINIC | Facility: CLINIC | Age: 77
End: 2022-03-21

## 2022-03-21 NOTE — TELEPHONE ENCOUNTER
Mohinder Michelle was last seen on 3/8  S/P TAVR  Dara Soulier He called today, c/o itching in both hands, and on the bottom of both feet  He asked if this could be related to the TAVR or meds and if you have heard anyone else with these symptoms post TAVR  He asked for your opinion first     Also said BP better at home since ov  Systolic in low 065T/81Z  Please advise

## 2022-03-21 NOTE — TELEPHONE ENCOUNTER
Itching could be related to any of his new medications    However, since the itching is described only on palms of his hands and feet, I recommend he be evaluated by his PCP 1st   Thank you

## 2022-03-22 ENCOUNTER — TELEPHONE (OUTPATIENT)
Dept: FAMILY MEDICINE CLINIC | Facility: HOSPITAL | Age: 77
End: 2022-03-22

## 2022-03-22 NOTE — TELEPHONE ENCOUNTER
Patient recently had TAVR procedure and was started on new meds  Now having itching in feet and hands    Cardio advised he call us first     Any suggestions on the relation between the itching and any of his new meds?    pcb - ok to wait until 3/23

## 2022-03-23 NOTE — TELEPHONE ENCOUNTER
Please call  Reviewed medications  It can be any of the new medications but is usually not a common cause of having issues only with hands and feet  Monitor for severe allergy with generalized rash, throat swelling, toruble breathing  In the mean I do think he needs to be on these medications  Please have him try benadryl 25 mg every 6 hours as needed  Add pepcid 20 mg daily  Check any new skin products such as new soap or detergent  Check for any new food  Monitor sytmpoms and let me known in 2-3 days

## 2022-03-28 ENCOUNTER — CLINICAL SUPPORT (OUTPATIENT)
Dept: CARDIAC REHAB | Facility: HOSPITAL | Age: 77
End: 2022-03-28
Payer: MEDICARE

## 2022-03-28 DIAGNOSIS — Z95.2 S/P TAVR (TRANSCATHETER AORTIC VALVE REPLACEMENT): Primary | ICD-10-CM

## 2022-03-28 PROCEDURE — 93797 PHYS/QHP OP CAR RHAB WO ECG: CPT

## 2022-03-28 NOTE — PROGRESS NOTES
Cardiac Rehabilitation Plan of Care   Initial Care Plan          Today's date: 3/28/2022   # of Exercise Sessions Completed: 1-evaluation  Patient name: Sammy Ceballos      : 1945  Age: 77 y o  MRN: [id number]  Referring Physician: Bryan Mensah,*  Cardiologist: Dr Jaelyn Sharp  Provider: [facility]  Clinician: Stella Douglass MS, CEP      Dx:   Encounter Diagnosis   Name Primary?  S/P TAVR (transcatheter aortic valve replacement)      Date of onset: 2022      SUMMARY OF PROGRESS:  Mr Robert Echevarria is here today for his cardiac rehabilitation evaluation after recent TAVR  He reports he is doing very well overall with recovery  He has resumed walking 3-4 miles/day  He does not feel he has any limitations  He enjoys the outdoors fishing, hunting, and doing yard work  His main goal for his rehab program is to lose weight with short term goal of -10-15 lbs, and long term goal of reaching 185 lbs  He acknowledges he needs to control what he is eating to be able to lose weight  Will plan to offer education and tips on healthy eating to help aid weight loss during his rehab program  He feels his biggest problem with diet right now is snacking on sweets-ice cream  He does mange portion size at this time  He denies depression (PHQ-9=1) and anxiety (BREA-7= 1) currently  He also reports minimal stress at this time  He does admit to being impatient, but is also working at improving how he reacts  He completed TM ETT today reaching THR at 4 3 METs at 7 min with stable hemodynamic response to exercise  Telemetry showed NSR  No cardiac complaints  Will plan to start exercise at 3 5-4 0 METs and increase as tolerated over first 30 days of rehab  He is in agreement to attend cardiac rehab sessions 3x/week for 12 weeks, or 36 sessions  He will start his sessions tomorrow 3/29/2022        Medication compliance: Yes   Comments: Pt reports to be compliant with medications  Fall Risk: Low   Comments: Ambulates with a steady gait with no assist device    EKG Interpretation: NSR      EXERCISE ASSESSMENT and PLAN    Current Exercise Program in Rehab:       Frequency: 3 days/week Supplement with home exercise 2+ days/wk as tolerated       Minutes: 35-40         METS: 3 5-4 0            HR: 30 beats above resting   RPE: 4-6         Modalities: Treadmill, UBE, Lifecycle, Rower, NuStep and Recumbent bike      Exercise Progression 30 Day Goals :    Frequency: 3 days/week of cardiac rehab     Supplement with home exercise 2+ days/wk as tolerated    Minutes: 40                            >150 mins/wk of moderate intensity exercise   METS: 4 0   HR: 30 beats above resting    RPE: 4-6   Modalities: Treadmill, Airdyne bike, UBE, Lifecycle, Elliptical, Rower, NuStep and Recumbent bike    Strength trainin-3 days / week  12-15 repetitions  1-2 sets per modality   Will be added following at least 8 weeks post surgery and 8-10 monitored sessions  Will be added following 2-3 weeks of monitored exercise sessions   Modalities: Pull Downs, Lateral Raise, Arm Extension, Arm Curl, Upright Rows, Front Raises, Shoulder Shrugs and leg ext    Home Exercise: Type: walking, Frequency: 7 days/week, Duration: 60 mins, Distance 3-4 miles    Goals: 10% improvement in functional capacity - based on max METs achieved in fitness assessment, improved DASI score by 10%, Increase in exercise capacity by 40% - based on peak METs tolerated in cardiac rehab exercise session, Exercise 5 days/wk, >150mins/wk of moderate intensity exercise, Resume ADLs with increased strength and Attend Rehab regularly    Progression Toward Goals:  Reviewed Pt goals and determined plan of care    Education: benefit of exercise for CAD risk factors, home exercise guidelines, AHA guidelines to achieve >150 mins/wk of moderate exercise and RPE scale   Plan:education on home exercise guidelines, home exercise 30+ mins 2 days opposite CR and Education class: Risk Factors for Heart Disease  Readiness to change: Maintenance: (Maintaining the behavior change)      NUTRITION ASSESSMENT AND PLAN    Weight control:    Starting weight: 202 lb   Current weight:     Waist circumference:    Startin"   Current:      Diabetes: Pre-diabetes  A1c: 5 4    last measured: 2022    Lipid management: Discussed diet and lipid management and Last lipid profile 10/27/2021  Chol 185    HDL 65      Goals:LDL <100, HDL >40, TRG <150, CHOL <200, decreased body fat% <25%   (M), reduced waist circumference <40 inches (M), Wt  loss 1-2 ppw,  goal of -10-15 lbs   and 2 5-5%  wt loss    Progression Toward Goals: Reviewed Pt goals and determined plan of care    Education: heart healthy eating  low sodium diet  hydration  nutrition for  lipid management  wt  loss   healthy choices while dining out  portion control  Plan: Education class: Reading Food Labels and Education Class: Heart Healthy Eating  Readiness to change: Preparation:  (Getting ready to change)       PSYCHOSOCIAL ASSESSMENT AND PLAN    Emotional:  Depression assessment:  PHQ-9 = 1-4 = Minimal Depression            Anxiety measure:  BREA-7 = 0-4  = Not anxious  Self-reported stress level:  2  Social support: Excellent    Goals:  increased energy and take time to relax    Progression Toward Goals: Reviewed Pt goals and determined plan of care    Education: signs/sxs of depression, benefits of a positive support system and stress management techniques  Plan: Practice relaxation techniques, Exercise, Spend time outdoors, Enjoy a hobby and Enjoy family  Readiness to change: Maintenance: (Maintaining the behavior change)      OTHER CORE COMPONENTS     Tobacco:   Social History     Tobacco Use   Smoking Status Never Smoker   Smokeless Tobacco Never Used       Tobacco Use Intervention:   N/A:  Patient is a non-smoker     Anginal Symptoms:  None   NTG use: No prescription    Blood pressure:    Restin/60   Exercise: 138/72    Goals: consistent BP < 130/80, reduced dietary sodium <2300mg, moderate intensity exercise >150 mins/wk, medication compliance and reduce number of medications  needed for BP control    Progression Toward Goals: Reviewed Pt goals and determined plan of care    Education:  understanding high blood pressure and it's relationship to CAD and low sodium diet and HTN  Plan: Class: Understanding Heart Disease and Class: Common Heart Medications  Readiness to change: Action:  (Changing behavior)

## 2022-03-28 NOTE — PROGRESS NOTES
CARDIAC REHAB ASSESSMENT    Today's date: 2022  Patient name: Jiles Mohs     :        MRN: [id number]  PCP: Hilaria Bowling MD  Referring Physician: Mayte Pak  Cardiologist: Dr Elizabeth Prieto  Surgeon: Dr Mauricio Patino  Dx:   Encounter Diagnosis   Name Primary? 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    CAD (coronary artery disease)     Cataract     [facility]congestive [facility] failure[facility])     Glaucoma     Hyperlipidemia     Moderate aortic insufficiency     Prediabetes     Severe aortic stenosis     Sinus bradycardia     Umbilical hernia          Physical Limitations: none    Fall Risk: Low   Comments: Ambulates with a steady gait with no assist device    Anginal Equivalent: None/denies angina   NTG use: No prescription    Risk Factors   Cholesterol: Yes  Smoking: Never used  HTN: Yes  DM: No  Obesity: Yes   Inactivity: No  Stress:  perceived  stress: 2/10   Stressors:no major stressors reported   Goals for Stress Management:Practice Relaxation Techniques, Exercise, Spend time outside, Enjoy a hobby and Spend time with family    Family History:  Family History   Problem Relation Age of Onset    Colon cancer Mother     Other Father         heart problem    Parkinsonism Family        Allergies: Patient has no known allergies    ETOH:   Social History     Substance and Sexual Activity   Alcohol Use Yes    Alcohol/week: 2 0 standard drinks    Types: 2 Cans of beer per week    Comment: twice a wk          Current Medications:   Current Outpatient Medications   Medication Sig Dispense Refill    acetaminophen (TYLENOL) 325 mg tablet Take 2 tablets (650 mg total) by mouth every 6 (six) hours as needed for fever (temperature greater than 101 F)  0    aspirin 81 mg chewable tablet Chew 1 tablet (81 mg total) daily 30 tablet 2    clopidogrel (PLAVIX) 75 mg tablet Take 1 tablet (75 mg total) by mouth daily 90 tablet 0    Lumigan 0 01 % ophthalmic drops       metoprolol tartrate (LOPRESSOR) 25 mg tablet Take 0 5 tablets (12 5 mg total) by mouth every 12 (twelve) hours 30 tablet 2    pantoprazole (PROTONIX) 40 mg tablet Take 1 tablet (40 mg total) by mouth daily before breakfast 30 tablet 0    simvastatin (ZOCOR) 10 mg tablet Take 1 tablet by mouth once daily 90 tablet 0     No current facility-administered medications for this visit           Functional Status Prior to Diagnosis for Treatment   Occupation: retired  Recreation: fishing, hunting, enjoys outdoors, yard work  ADLs: No limitations  [place]: No limitations  Exercise: regular walking 3-4 miles daily  Other: had been going to gym regularly prior to pandemic    Current Functional Status  Occupation: retired  Recreation: fishing, hunting, enjoys outdoors, yard work  ADLs: No limitations  [place]: No limitations  Exercise: regular walking 3-4 miles daily  Other: had been going to gym regularly prior to pandemic    Patient Specific Goals:  Weight loss long term goal to reach 185 lb, ST -10-15 lbs, re-establish regular gym routine with weight training    Short Term Program Goals: dietary modifications increased strength improved energy/stamina with ADLs exercise 120-150 mins/wk improved BG control    Long Term Goals: increased maximal walking duration  increased intial training workload  Improved [facility] Activity Status score  Improved functional capacity  Improved Quality of Life - [facility] score reduced  Improved lipid profile  Reduced body fat%  Reduced waist circumference  weight loss goal of -10-15 lbs    Ability to reach goals/rehabilitation potential: Excellent    Projected return to function: 12 weeks  Objective tests: sub-max TM ETT      Nutritional   Reviewed details of Rate your Plate  Discussed key elements of heart healthy eating  Reviewed patient goals for dietary modifications and their clinical implications  Reviewed most recent lipid profile       Goals for dietary modification: choose lean cuts of meat  poultry without the skin  low fat ground meat and poultry  eliminate processed meats  reduce portions of meat to 3 oz  increase fish intake  more meatless meals  low fat dairy   reduced fat cheese  increase whole grains  increase fruits and vegetables  eliminate butter  low sodium  improved snack choices  more nuts/seeds  reduce sweets/frozen desserts      Emotional/Social  Patient reports he/she is coping well with good social support and denies depression or anxiety  Reports sufficient emotional support    Marital status:     Domestic Violence Screening: No    Comments: n/a

## 2022-03-29 ENCOUNTER — CLINICAL SUPPORT (OUTPATIENT)
Dept: CARDIAC REHAB | Facility: HOSPITAL | Age: 77
End: 2022-03-29
Payer: MEDICARE

## 2022-03-29 DIAGNOSIS — Z95.2 S/P TAVR (TRANSCATHETER AORTIC VALVE REPLACEMENT): Primary | ICD-10-CM

## 2022-03-29 PROCEDURE — 93798 PHYS/QHP OP CAR RHAB W/ECG: CPT

## 2022-03-30 ENCOUNTER — LAB (OUTPATIENT)
Dept: LAB | Facility: HOSPITAL | Age: 77
End: 2022-03-30
Payer: MEDICARE

## 2022-03-30 DIAGNOSIS — Z95.2 S/P TAVR (TRANSCATHETER AORTIC VALVE REPLACEMENT): ICD-10-CM

## 2022-03-30 DIAGNOSIS — I35.0 NONRHEUMATIC AORTIC VALVE STENOSIS: ICD-10-CM

## 2022-03-30 LAB
ANION GAP SERPL CALCULATED.3IONS-SCNC: 3 MMOL/L (ref 4–13)
BASOPHILS # BLD AUTO: 0.04 THOUSANDS/ΜL (ref 0–0.1)
BASOPHILS NFR BLD AUTO: 1 % (ref 0–1)
BUN SERPL-MCNC: 28 MG/DL (ref 5–25)
CALCIUM SERPL-MCNC: 9.2 MG/DL (ref 8.3–10.1)
CHLORIDE SERPL-SCNC: 110 MMOL/L (ref 100–108)
CO2 SERPL-SCNC: 27 MMOL/L (ref 21–32)
CREAT SERPL-MCNC: 1.11 MG/DL (ref 0.6–1.3)
EOSINOPHIL # BLD AUTO: 0.14 THOUSAND/ΜL (ref 0–0.61)
EOSINOPHIL NFR BLD AUTO: 2 % (ref 0–6)
ERYTHROCYTE [DISTWIDTH] IN BLOOD BY AUTOMATED COUNT: 12.2 % (ref 11.6–15.1)
GFR SERPL CREATININE-BSD FRML MDRD: 64 ML/MIN/1.73SQ M
GLUCOSE P FAST SERPL-MCNC: 122 MG/DL (ref 65–99)
HCT VFR BLD AUTO: 42.4 % (ref 36.5–49.3)
HGB BLD-MCNC: 13.8 G/DL (ref 12–17)
IMM GRANULOCYTES # BLD AUTO: 0.06 THOUSAND/UL (ref 0–0.2)
IMM GRANULOCYTES NFR BLD AUTO: 1 % (ref 0–2)
LYMPHOCYTES # BLD AUTO: 1.23 THOUSANDS/ΜL (ref 0.6–4.47)
LYMPHOCYTES NFR BLD AUTO: 19 % (ref 14–44)
MCH RBC QN AUTO: 33.1 PG (ref 26.8–34.3)
MCHC RBC AUTO-ENTMCNC: 32.5 G/DL (ref 31.4–37.4)
MCV RBC AUTO: 102 FL (ref 82–98)
MONOCYTES # BLD AUTO: 0.69 THOUSAND/ΜL (ref 0.17–1.22)
MONOCYTES NFR BLD AUTO: 11 % (ref 4–12)
NEUTROPHILS # BLD AUTO: 4.34 THOUSANDS/ΜL (ref 1.85–7.62)
NEUTS SEG NFR BLD AUTO: 66 % (ref 43–75)
NRBC BLD AUTO-RTO: 0 /100 WBCS
PLATELET # BLD AUTO: 191 THOUSANDS/UL (ref 149–390)
PMV BLD AUTO: 10 FL (ref 8.9–12.7)
POTASSIUM SERPL-SCNC: 4.1 MMOL/L (ref 3.5–5.3)
RBC # BLD AUTO: 4.17 MILLION/UL (ref 3.88–5.62)
SODIUM SERPL-SCNC: 140 MMOL/L (ref 136–145)
WBC # BLD AUTO: 6.5 THOUSAND/UL (ref 4.31–10.16)

## 2022-03-30 PROCEDURE — 36415 COLL VENOUS BLD VENIPUNCTURE: CPT

## 2022-03-30 PROCEDURE — 85025 COMPLETE CBC W/AUTO DIFF WBC: CPT

## 2022-03-30 PROCEDURE — 80048 BASIC METABOLIC PNL TOTAL CA: CPT

## 2022-03-31 ENCOUNTER — APPOINTMENT (OUTPATIENT)
Dept: CARDIAC REHAB | Facility: HOSPITAL | Age: 77
End: 2022-03-31
Payer: MEDICARE

## 2022-03-31 ENCOUNTER — CLINICAL SUPPORT (OUTPATIENT)
Dept: CARDIAC REHAB | Facility: HOSPITAL | Age: 77
End: 2022-03-31
Payer: MEDICARE

## 2022-03-31 DIAGNOSIS — Z95.2 S/P TAVR (TRANSCATHETER AORTIC VALVE REPLACEMENT): Primary | ICD-10-CM

## 2022-03-31 PROCEDURE — 93798 PHYS/QHP OP CAR RHAB W/ECG: CPT

## 2022-04-01 ENCOUNTER — OFFICE VISIT (OUTPATIENT)
Dept: CARDIAC SURGERY | Facility: CLINIC | Age: 77
End: 2022-04-01
Payer: MEDICARE

## 2022-04-01 ENCOUNTER — HOSPITAL ENCOUNTER (OUTPATIENT)
Dept: NON INVASIVE DIAGNOSTICS | Facility: HOSPITAL | Age: 77
Discharge: HOME | End: 2022-04-01
Payer: MEDICARE

## 2022-04-01 ENCOUNTER — APPOINTMENT (OUTPATIENT)
Dept: CARDIAC REHAB | Facility: HOSPITAL | Age: 77
End: 2022-04-01
Payer: MEDICARE

## 2022-04-01 VITALS
BODY MASS INDEX: 30.62 KG/M2 | HEART RATE: 85 BPM | HEIGHT: 69 IN | DIASTOLIC BLOOD PRESSURE: 64 MMHG | WEIGHT: 206.7 LBS | OXYGEN SATURATION: 98 % | TEMPERATURE: 97.5 F | SYSTOLIC BLOOD PRESSURE: 144 MMHG | RESPIRATION RATE: 18 BRPM

## 2022-04-01 VITALS
WEIGHT: 204 LBS | HEART RATE: 68 BPM | BODY MASS INDEX: 30.21 KG/M2 | DIASTOLIC BLOOD PRESSURE: 76 MMHG | HEIGHT: 69 IN | SYSTOLIC BLOOD PRESSURE: 142 MMHG

## 2022-04-01 DIAGNOSIS — I35.0 NONRHEUMATIC AORTIC VALVE STENOSIS: ICD-10-CM

## 2022-04-01 DIAGNOSIS — Z95.2 S/P TAVR (TRANSCATHETER AORTIC VALVE REPLACEMENT): ICD-10-CM

## 2022-04-01 DIAGNOSIS — Z95.2 S/P TAVR (TRANSCATHETER AORTIC VALVE REPLACEMENT): Primary | ICD-10-CM

## 2022-04-01 LAB
AORTIC ROOT: 2.9 CM
AORTIC VALVE MEAN VELOCITY: 18.2 M/S
APICAL FOUR CHAMBER EJECTION FRACTION: 66 %
AV AREA BY CONTINUOUS VTI: 1.2 CM2
AV AREA PEAK VELOCITY: 1.2 CM2
AV LVOT MEAN GRADIENT: 1 MMHG
AV LVOT PEAK GRADIENT: 3 MMHG
AV MEAN GRADIENT: 14 MMHG
AV PEAK GRADIENT: 26 MMHG
AV VALVE AREA: 1.23 CM2
AV VELOCITY RATIO: 0.31
DOP CALC AO PEAK VEL: 2.55 M/S
DOP CALC AO VTI: 54.06 CM
DOP CALC LVOT AREA: 3.8 CM2
DOP CALC LVOT DIAMETER: 2.2 CM
DOP CALC LVOT PEAK VEL VTI: 17.51 CM
DOP CALC LVOT PEAK VEL: 0.8 M/S
DOP CALC LVOT STROKE INDEX: 32.7 ML/M2
DOP CALC LVOT STROKE VOLUME: 66.53 CM3
E WAVE DECELERATION TIME: 192 MS
FRACTIONAL SHORTENING: 28 % (ref 28–44)
INTERVENTRICULAR SEPTUM IN DIASTOLE (PARASTERNAL SHORT AXIS VIEW): 0.8 CM
INTERVENTRICULAR SEPTUM: 0.8 CM (ref 0.55–1.03)
LAAS-AP2: 23.2 CM2
LAAS-AP4: 22.5 CM2
LEFT ATRIUM SIZE: 4.4 CM
LEFT INTERNAL DIMENSION IN SYSTOLE: 3.9 CM (ref 3.46–5.23)
LEFT VENTRICULAR INTERNAL DIMENSION IN DIASTOLE: 5.4 CM (ref 5.72–8.52)
LEFT VENTRICULAR POSTERIOR WALL IN END DIASTOLE: 1 CM (ref 0.53–1.01)
LEFT VENTRICULAR STROKE VOLUME: 74 ML
LVSV (TEICH): 74 ML
MV E'TISSUE VEL-SEP: 5 CM/S
MV PEAK A VEL: 0.93 M/S
MV PEAK E VEL: 59 CM/S
MV STENOSIS PRESSURE HALF TIME: 56 MS
MV VALVE AREA P 1/2 METHOD: 3.93 CM2
RIGHT ATRIAL 2D VOLUME: 46 ML
RIGHT ATRIUM AREA SYSTOLE A4C: 17.9 CM2
RIGHT VENTRICLE ID DIMENSION: 3.6 CM
SL CV LEFT ATRIUM LENGTH A2C: 5.6 CM
SL CV LV EF: 60
SL CV PED ECHO LEFT VENTRICLE DIASTOLIC VOLUME (MOD BIPLANE) 2D: 142 ML
SL CV PED ECHO LEFT VENTRICLE SYSTOLIC VOLUME (MOD BIPLANE) 2D: 67 ML
TR MAX PG: 24 MMHG
TR PEAK VELOCITY: 2.5 M/S
TRICUSPID VALVE PEAK REGURGITATION VELOCITY: 2.47 M/S
Z-SCORE OF INTERVENTRICULAR SEPTUM IN END DIASTOLE: 0.1
Z-SCORE OF LEFT VENTRICULAR DIMENSION IN END DIASTOLE: -2.56
Z-SCORE OF LEFT VENTRICULAR DIMENSION IN END SYSTOLE: -0.68
Z-SCORE OF LEFT VENTRICULAR POSTERIOR WALL IN END DIASTOLE: 1.85

## 2022-04-01 PROCEDURE — 99214 OFFICE O/P EST MOD 30 MIN: CPT | Performed by: NURSE PRACTITIONER

## 2022-04-01 PROCEDURE — 93306 TTE W/DOPPLER COMPLETE: CPT

## 2022-04-01 PROCEDURE — 93005 ELECTROCARDIOGRAM TRACING: CPT

## 2022-04-01 PROCEDURE — 93306 TTE W/DOPPLER COMPLETE: CPT | Performed by: INTERNAL MEDICINE

## 2022-04-01 NOTE — PROGRESS NOTES
POST OP FOLLOW UP VISIT S/P TAVR    Procedure: S/P Transcatheter aortic valve replacement with a 29 mm Pan PAULO 3 bioprosthetic valve via a percutaneous left transfemoral approach  , performed on 2/22/22  History: Sita Martinez is a 77y o  year old male who presents to our office today for routine follow up care from transfemoral transcatheter aortic valve replacement  Patient tolerate procedure well  Post op Echo and ECG stable  Patient discharged to home on POD # 1  Patient has had routine out patient PCP and Cardiology follow up  Patient reports doing well, he feels good and denies angina, BALES, edema, weight gain, lightheadedness or palpitations  He reports itching palms and soles of his feet (no rash)  after returning home  His PCP prescribed benadryl which helped  Patient denies issues with his left groin access site  He has started cardiac rehab       Review of System:     History obtained from chart review and the patient  General ROS: negative  Psychological ROS: negative  Ophthalmic ROS: negative  ENT ROS: negative  Allergy and Immunology ROS: negative  Hematological and Lymphatic ROS: negative  Endocrine ROS: negative  Breast ROS: negative  Respiratory ROS: no cough, shortness of breath, or wheezing  Cardiovascular ROS: no chest pain or dyspnea on exertion  Gastrointestinal ROS: no abdominal pain, change in bowel habits, or black or bloody stools  Genito-Urinary ROS: no dysuria, trouble voiding, or hematuria  Musculoskeletal ROS: negative  Neurological ROS: no TIA or stroke symptoms  Dermatological ROS: negative    Vital Signs:     Vitals:    04/01/22 1303 04/01/22 1308   BP: 142/80 144/64   BP Location: Left arm Right arm   Patient Position: Sitting Sitting   Cuff Size: Standard    Pulse: 85    Resp: 18    Temp: 97 5 °F (36 4 °C)    TempSrc: Tympanic    SpO2: 98%    Weight: 93 8 kg (206 lb 11 2 oz)    Height: 5' 9" (1 753 m)        Home Medications:     Prior to Admission medications Medication Sig Start Date End Date Taking? Authorizing Provider   acetaminophen (TYLENOL) 325 mg tablet Take 2 tablets (650 mg total) by mouth every 6 (six) hours as needed for fever (temperature greater than 101 F) 2/23/22   Lucita Ye PA-C   aspirin 81 mg chewable tablet Chew 1 tablet (81 mg total) daily 2/24/22   Lucita Ye PA-C   clopidogrel (PLAVIX) 75 mg tablet Take 1 tablet (75 mg total) by mouth daily 2/23/22 5/24/22  Lucita Ye PA-C   Lumigan 0 01 % ophthalmic drops  11/3/21   Historical Provider, MD   metoprolol tartrate (LOPRESSOR) 25 mg tablet Take 0 5 tablets (12 5 mg total) by mouth every 12 (twelve) hours 2/23/22   Lucita Ye PA-C   pantoprazole (PROTONIX) 40 mg tablet Take 1 tablet (40 mg total) by mouth daily before breakfast 2/24/22 3/26/22  Lucita Ye PA-C   simvastatin (ZOCOR) 10 mg tablet Take 1 tablet by mouth once daily 1/26/22   Berny Gonzalez MD       Physical Exam:    General: Alert, oriented, well developed, no acute distress  HEENT/NECK:  PERRLA  No jugular venous distention  Cardiac:Regular rate and rhythm, no murmurs rubs or gallops  Pulmonary:Breath sounds clear bilaterally  Abdomen:  Non-tender, Non-distended  Positive bowel sounds  Upper extremities: 2+ radial pulses; brisk capillary refill  Lower extremities: Extremities warm/dry  Left femoral puls 1+, no hematoma or ecchymosis; PT/DP pulses 2+ bilaterally  No edema B/L  Incisions: Inguinal puncture site is clean, dry, and intact  Musculoskeletal: MAEE, stable gait  Neuro: Alert and oriented X 3  Sensation is grossly intact  No focal deficits  Skin: Warm/Dry, without rashes or lesions      Lab Results:     Results from last 7 days   Lab Units 03/30/22  0712   WBC Thousand/uL 6 50   HEMOGLOBIN g/dL 13 8   HEMATOCRIT % 42 4   PLATELETS Thousands/uL 191     Results from last 7 days   Lab Units 03/30/22  0712   POTASSIUM mmol/L 4 1   CHLORIDE mmol/L 110*   CO2 mmol/L 27 BUN mg/dL 28*   CREATININE mg/dL 1 11   CALCIUM mg/dL 9 2       Imaging Studies:     Transthoracic Echocardiogram: today      Left Ventricle: Left ventricular cavity size is normal  Wall thickness is mildly increased  The left ventricular ejection fraction is 60%  Systolic function is normal  Wall motion is normal  Diastolic function is mildly abnormal, consistent with grade I (abnormal) relaxation  There is mild concentric hypertrophy    Right Ventricle: Right ventricular cavity size is normal  Systolic function is normal     Left Atrium: The atrium is mildly dilated    Aortic Valve: There is an Pan PAULO 3 Ultra 29 mm TAVR bioprosthetic valve  The prosthetic valve appears well-seated and appears to be functioning normally  There is no evidence of paravalvular regurgitation  There is no evidence of transvalvular regurgitation  The aortic valve peak velocity is 2 55 m/s  The aortic valve peak gradient is 26 mmHg  The aortic valve mean gradient is 14 mmHg  The DVI is 0 31  The aortic valve area is 1 23 cm2    Mitral Valve: There is mild annular calcification      EKG: today  pending    I have personally reviewed pertinent films in PACS     PCP and Cardiology notes reviewed      TAVR evaluation Assessment:     Roxanna Mendez 122: I    Assessment:   Aortic stenosis, Non-Rheumatic  S/P transfemoral transcatheter aortic valve replacement    Dimple Lamar is making good progress in their post-op recovery  They are at NYHA functional class I  Left groin incision is well healed  Weight and VS are stable  Recent echocardiogram demonstrates normally functioning TAVR bioprosthesis  ECG pending; BMP & CBC WNL   Plan:   Medications reviewed with patient  Recommended Plavix therapy after TAVR is for 90 days only but Aspirin 81 mg daily is lifelong       Benefits of participating in cardiac rehabilitation discussed with patient and they are cleared to proceed with the program     May resume driving and all normal activities  Dereje Strange will return for one year follow-up in our office with repeat echocardiogram, ECG, CBC & BMP  Our office will contact patient to schedule appointment  They have been advised to maintain routine follow up with their cardiologist and PCP for ongoing medical care  Patient was comfortable with our recommendations and their questions were answered to their satisfaction  The patient recently had a screening colonoscopy in 6/17/21  Therefore GI referral is not indicated at this time       CLAUDIO Shaikh  4/1/22  1:10 PM

## 2022-04-02 LAB
ATRIAL RATE: 62 BPM
P AXIS: -12 DEGREES
PR INTERVAL: 180 MS
QRS AXIS: 11 DEGREES
QRSD INTERVAL: 92 MS
QT INTERVAL: 400 MS
QTC INTERVAL: 406 MS
T WAVE AXIS: -11 DEGREES
VENTRICULAR RATE: 62 BPM

## 2022-04-02 PROCEDURE — 93010 ELECTROCARDIOGRAM REPORT: CPT | Performed by: INTERNAL MEDICINE

## 2022-04-05 ENCOUNTER — CLINICAL SUPPORT (OUTPATIENT)
Dept: CARDIAC REHAB | Facility: HOSPITAL | Age: 77
End: 2022-04-05
Payer: MEDICARE

## 2022-04-05 DIAGNOSIS — Z95.2 S/P TAVR (TRANSCATHETER AORTIC VALVE REPLACEMENT): Primary | ICD-10-CM

## 2022-04-05 PROCEDURE — 93798 PHYS/QHP OP CAR RHAB W/ECG: CPT

## 2022-04-07 ENCOUNTER — CLINICAL SUPPORT (OUTPATIENT)
Dept: CARDIAC REHAB | Facility: HOSPITAL | Age: 77
End: 2022-04-07
Payer: MEDICARE

## 2022-04-07 DIAGNOSIS — Z95.2 S/P TAVR (TRANSCATHETER AORTIC VALVE REPLACEMENT): Primary | ICD-10-CM

## 2022-04-07 PROCEDURE — 93798 PHYS/QHP OP CAR RHAB W/ECG: CPT

## 2022-04-08 ENCOUNTER — CLINICAL SUPPORT (OUTPATIENT)
Dept: CARDIAC REHAB | Facility: HOSPITAL | Age: 77
End: 2022-04-08
Payer: MEDICARE

## 2022-04-08 DIAGNOSIS — Z95.2 S/P TAVR (TRANSCATHETER AORTIC VALVE REPLACEMENT): Primary | ICD-10-CM

## 2022-04-08 PROCEDURE — 93798 PHYS/QHP OP CAR RHAB W/ECG: CPT

## 2022-04-12 ENCOUNTER — CLINICAL SUPPORT (OUTPATIENT)
Dept: CARDIAC REHAB | Facility: HOSPITAL | Age: 77
End: 2022-04-12
Payer: MEDICARE

## 2022-04-12 DIAGNOSIS — Z95.2 S/P TAVR (TRANSCATHETER AORTIC VALVE REPLACEMENT): Primary | ICD-10-CM

## 2022-04-12 PROCEDURE — 93798 PHYS/QHP OP CAR RHAB W/ECG: CPT

## 2022-04-14 ENCOUNTER — APPOINTMENT (OUTPATIENT)
Dept: CARDIAC REHAB | Facility: HOSPITAL | Age: 77
End: 2022-04-14
Payer: MEDICARE

## 2022-04-15 ENCOUNTER — CLINICAL SUPPORT (OUTPATIENT)
Dept: CARDIAC REHAB | Facility: HOSPITAL | Age: 77
End: 2022-04-15
Payer: MEDICARE

## 2022-04-15 DIAGNOSIS — Z95.2 S/P TAVR (TRANSCATHETER AORTIC VALVE REPLACEMENT): Primary | ICD-10-CM

## 2022-04-15 PROCEDURE — 93798 PHYS/QHP OP CAR RHAB W/ECG: CPT

## 2022-04-19 ENCOUNTER — CLINICAL SUPPORT (OUTPATIENT)
Dept: CARDIAC REHAB | Facility: HOSPITAL | Age: 77
End: 2022-04-19
Payer: MEDICARE

## 2022-04-19 DIAGNOSIS — Z95.2 S/P TAVR (TRANSCATHETER AORTIC VALVE REPLACEMENT): Primary | ICD-10-CM

## 2022-04-19 PROCEDURE — 93798 PHYS/QHP OP CAR RHAB W/ECG: CPT

## 2022-04-19 NOTE — PROGRESS NOTES
Exercise session details     Noted on today's sessions - increased ectopy - PVCs/Bigeminy - see attached strips

## 2022-04-21 ENCOUNTER — CLINICAL SUPPORT (OUTPATIENT)
Dept: CARDIAC REHAB | Facility: HOSPITAL | Age: 77
End: 2022-04-21
Payer: MEDICARE

## 2022-04-21 DIAGNOSIS — Z95.2 S/P TAVR (TRANSCATHETER AORTIC VALVE REPLACEMENT): Primary | ICD-10-CM

## 2022-04-21 PROCEDURE — 93798 PHYS/QHP OP CAR RHAB W/ECG: CPT

## 2022-04-21 NOTE — PROGRESS NOTES
Telemetry shows increased ventricular ectopy with exercise past two sessions  Patient is asymptomatic  Please review telemetry strips

## 2022-04-22 ENCOUNTER — CLINICAL SUPPORT (OUTPATIENT)
Dept: CARDIAC REHAB | Facility: HOSPITAL | Age: 77
End: 2022-04-22
Payer: MEDICARE

## 2022-04-22 DIAGNOSIS — Z95.2 S/P TAVR (TRANSCATHETER AORTIC VALVE REPLACEMENT): Primary | ICD-10-CM

## 2022-04-22 PROCEDURE — 93798 PHYS/QHP OP CAR RHAB W/ECG: CPT

## 2022-04-25 ENCOUNTER — OFFICE VISIT (OUTPATIENT)
Dept: CARDIOLOGY CLINIC | Facility: CLINIC | Age: 77
End: 2022-04-25
Payer: MEDICARE

## 2022-04-25 VITALS
SYSTOLIC BLOOD PRESSURE: 128 MMHG | HEIGHT: 69 IN | DIASTOLIC BLOOD PRESSURE: 60 MMHG | HEART RATE: 65 BPM | BODY MASS INDEX: 29.95 KG/M2 | WEIGHT: 202.2 LBS

## 2022-04-25 DIAGNOSIS — Z95.2 S/P TAVR (TRANSCATHETER AORTIC VALVE REPLACEMENT): ICD-10-CM

## 2022-04-25 DIAGNOSIS — E78.2 MIXED HYPERLIPIDEMIA: Primary | ICD-10-CM

## 2022-04-25 PROCEDURE — 99214 OFFICE O/P EST MOD 30 MIN: CPT | Performed by: INTERNAL MEDICINE

## 2022-04-26 ENCOUNTER — CLINICAL SUPPORT (OUTPATIENT)
Dept: CARDIAC REHAB | Facility: HOSPITAL | Age: 77
End: 2022-04-26
Payer: MEDICARE

## 2022-04-26 DIAGNOSIS — Z95.2 S/P TAVR (TRANSCATHETER AORTIC VALVE REPLACEMENT): Primary | ICD-10-CM

## 2022-04-26 PROCEDURE — 93798 PHYS/QHP OP CAR RHAB W/ECG: CPT

## 2022-04-26 NOTE — PROGRESS NOTES
Cardiac Rehabilitation Plan of Care   30 Day Reassessment          Today's date: 2022   # of Exercise Sessions Completed: 12  Patient name: Jesica Goldstein      : 1945  Age: 77 y o  MRN: [id number]  Referring Physician: Nando Perkins,*  Cardiologist: Dr Campos Dear  Provider: Deonte  Clinician: Su Metzger MS, CEP       Dx:   Encounter Diagnosis   Name Primary?  S/P TAVR (transcatheter aortic valve replacement) Yes     Date of onset: 2022      SUMMARY OF PROGRESS:  Mr Fawn Fry has begun his cardiac rehabilitation sessions after TAVR on 22  He has attended all 12 sessions in the past 30 days  He continues to report he is doing very well overall with recovery  He has resumed walking 3-4 miles/day and is doing this on a daily basis with coming to rehab  He does not feel he has any limitations  He enjoys the outdoors fishing, hunting, and doing yard work  His main goal for his rehab program is to lose weight with short term goal of -10-15 lbs, and long term goal of reaching 185 lbs  He continues to work towards his personal goals at 30 days  His current weight today is 201 lbs  He acknowledges he needs to control what he is eating to be able to lose weight  He does have a few beers here and there but is trying to be more aware of food choices  Will plan to offer education and tips on healthy eating to help aid weight loss during his rehab program  He feels his biggest problem with diet right now is snacking on sweets-ice cream  He does mange portion size at this time  He denies depression (PHQ-9=1) and anxiety (BREA-7= 1) currently  No need to reassess PHQ-9/BREA-7 at 30 days  He also reports some stress with family issues but has resolved  He does admit to being impatient, but is also working at improving how he reacts  Jaron Genao has been attending weekly group education classes on cardiac risk factors    He is completing 40-50 minutes of aerobic exercise and reaching 4 0 METs on different modalities such as the TRM, LifeCycle, UBE, and Nustep  He also started a strength training program today  Patient is tolerating increasing workloads well with no concerns  Resting //82 with appropriate hemodynamic response to exercise 132//80  Telemetry continues to show NSR with some more freq  PVCs noted with exercise and rest  Dr Flores Files notified  Patient reported that he had a MD appointment with Dr Sandra Menon yesterday that went well with a good report  D/C blood thinner  Will continue to educate and progress as tolerated in the next 30 days  Medication compliance: Yes   Comments: Pt reports to be compliant with medications  Fall Risk: Low   Comments: Ambulates with a steady gait with no assist device    EKG Interpretation: NSR with occ to freq   PVCs noted       EXERCISE ASSESSMENT and PLAN    Current Exercise Program in Rehab:       Frequency: 3 days/week Supplement with home exercise 2+ days/wk as tolerated       Minutes: 40-50        METS: 4 0           HR: 30 beats above resting   RPE: 4-6         Modalities: Treadmill, UBE, Lifecycle, NuStep and Recumbent bike      Exercise Progression 30 Day Goals :    Frequency: 3 days/week of cardiac rehab     Supplement with home exercise 2+ days/wk as tolerated    Minutes: 50-55                          >150 mins/wk of moderate intensity exercise   METS: 4 0-4 5   HR: 30 beats above resting    RPE: 4-6   Modalities: Treadmill, Airdyne bike, UBE, Lifecycle, Elliptical, Rower, NuStep and Recumbent bike    Strength trainin-3 days / week  12-15 repetitions  1-2 sets per modality   Will be added following at least 8 weeks post surgery and 8-10 monitored sessions  Will be added following 2-3 weeks of monitored exercise sessions   Modalities: Pull Downs, Lateral Raise, Arm Extension, Arm Curl, Upright Rows, Front Raises, Shoulder Shrugs and leg ext    Home Exercise: Type: walking, Frequency: 7 days/week, Duration: 60 mins, Distance 3-4 miles    Goals: 10% improvement in functional capacity - based on max METs achieved in fitness assessment, improved DASI score by 10%, Increase in exercise capacity by 40% - based on peak METs tolerated in cardiac rehab exercise session, Exercise 5 days/wk, >150mins/wk of moderate intensity exercise, Resume ADLs with increased strength and Attend Rehab regularly    Progression Toward Goals:  Pt is progressing and showing improvement  toward the following goals:  increasing functional METs, attending rehab regularly 3x/week, started strength training program, reaching >150 mins/week of moderate aerobic exercise, resumed ADLs, and attending weekly group education classes  , Patient will continue to increase intensities and duration on different modalties and continue strength traing program 2x/week in the next 30 days, Will continue to educate and progress as tolerated  Education: benefit of exercise for CAD risk factors, home exercise guidelines, AHA guidelines to achieve >150 mins/wk of moderate exercise and RPE scale   Plan:education on home exercise guidelines, home exercise 30+ mins 2 days opposite CR and Education class: Risk Factors for Heart Disease  Readiness to change: Maintenance: (Maintaining the behavior change)      NUTRITION ASSESSMENT AND PLAN    Weight control:    Starting weight: 202 lb   Current weight:   201 lbs   Waist circumference:    Startin"   Current:      Diabetes: Pre-diabetes  A1c: 5 4    last measured: 2022    Lipid management: Discussed diet and lipid management and Last lipid profile 10/27/2021  Chol 185    HDL 65      Goals:LDL <100, HDL >40, TRG <150, CHOL <200, decreased body fat% <25%   (M), reduced waist circumference <40 inches (M), Wt  loss 1-2 ppw,  goal of -10-15 lbs   and 2 5-5%  wt loss    Progression Toward Goals: Pt is progressing and showing improvement  toward the following goals:  aware of food choices and knows what to do    , Pt has not made progress toward the following goals: maintaining weight, no weight loss (personal goal)   , Patient will decrease portion sizes and educate on weight loss strategies in the next 30 days, Will continue to educate and progress as tolerated  Education: heart healthy eating  low sodium diet  hydration  nutrition for  lipid management  wt  loss   healthy choices while dining out  portion control  Plan: Education class: Reading Food Labels and Education Class: Heart Healthy Eating  Readiness to change: Preparation:  (Getting ready to change)       PSYCHOSOCIAL ASSESSMENT AND PLAN    Emotional:  Depression assessment:  PHQ-9 = 1-4 = Minimal Depression            Anxiety measure:  BREA-7 = 0-4  = Not anxious  Self-reported stress level:  2  Social support: Excellent    Goals:  increased energy and take time to relax    Progression Toward Goals: Pt is progressing and showing improvement  toward the following goals:  denies depression and anxiety  Positive attitude and motivated each session  Managing stress  , Patient will continue to manage family stress and attend group education classes in the next 30 days, Will continue to educate and progress as tolerated      Education: signs/sxs of depression, benefits of a positive support system and stress management techniques  Plan: Practice relaxation techniques, Exercise, Spend time outdoors, Enjoy a hobby and Enjoy family  Readiness to change: Maintenance: (Maintaining the behavior change)      OTHER CORE COMPONENTS     Tobacco:   Social History     Tobacco Use   Smoking Status Never Smoker   Smokeless Tobacco Never Used       Tobacco Use Intervention:   N/A:  Patient is a non-smoker     Anginal Symptoms:  None   NTG use: No prescription    Blood pressure:    Restin//82   Exercise: 132//80    Goals: consistent BP < 130/80, reduced dietary sodium <2300mg, moderate intensity exercise >150 mins/wk, medication compliance and reduce number of medications  needed for BP control    Progression Toward Goals: Pt is progressing and showing improvement  toward the following goals:  blood pressures within normal limits at rest and appropriate hemodynamic response to exercise and >150 mins/week of moderate intensity exercise  D/C blood thinner     , Patient will continue to montior dietary intakes/sodium intake/work on weight loss goal  in the next 30 days, Will continue to educate and progress as tolerated      Education:  understanding high blood pressure and it's relationship to CAD and low sodium diet and HTN  Plan: Class: Understanding Heart Disease and Class: Common Heart Medications  Readiness to change: Action:  (Changing behavior)

## 2022-04-28 ENCOUNTER — CLINICAL SUPPORT (OUTPATIENT)
Dept: CARDIAC REHAB | Facility: HOSPITAL | Age: 77
End: 2022-04-28
Payer: MEDICARE

## 2022-04-28 DIAGNOSIS — Z95.2 S/P TAVR (TRANSCATHETER AORTIC VALVE REPLACEMENT): Primary | ICD-10-CM

## 2022-04-28 PROCEDURE — 93798 PHYS/QHP OP CAR RHAB W/ECG: CPT

## 2022-04-29 ENCOUNTER — CLINICAL SUPPORT (OUTPATIENT)
Dept: CARDIAC REHAB | Facility: HOSPITAL | Age: 77
End: 2022-04-29
Payer: MEDICARE

## 2022-04-29 DIAGNOSIS — Z95.2 S/P TAVR (TRANSCATHETER AORTIC VALVE REPLACEMENT): Primary | ICD-10-CM

## 2022-04-29 PROCEDURE — 93798 PHYS/QHP OP CAR RHAB W/ECG: CPT

## 2022-05-01 DIAGNOSIS — E78.5 HYPERLIPIDEMIA, UNSPECIFIED HYPERLIPIDEMIA TYPE: ICD-10-CM

## 2022-05-01 RX ORDER — SIMVASTATIN 10 MG
TABLET ORAL
Qty: 90 TABLET | Refills: 0 | Status: SHIPPED | OUTPATIENT
Start: 2022-05-01 | End: 2022-08-09

## 2022-05-03 ENCOUNTER — CLINICAL SUPPORT (OUTPATIENT)
Dept: CARDIAC REHAB | Facility: HOSPITAL | Age: 77
End: 2022-05-03
Payer: MEDICARE

## 2022-05-03 ENCOUNTER — TELEPHONE (OUTPATIENT)
Dept: CARDIOLOGY CLINIC | Facility: CLINIC | Age: 77
End: 2022-05-03

## 2022-05-03 DIAGNOSIS — Z95.2 S/P TAVR (TRANSCATHETER AORTIC VALVE REPLACEMENT): Primary | ICD-10-CM

## 2022-05-03 PROCEDURE — 93798 PHYS/QHP OP CAR RHAB W/ECG: CPT

## 2022-05-03 NOTE — TELEPHONE ENCOUNTER
[facility] needs to know the dose of what antibiotic this pt needs to take before dental procedures  Please advise

## 2022-05-05 ENCOUNTER — CLINICAL SUPPORT (OUTPATIENT)
Dept: CARDIAC REHAB | Facility: HOSPITAL | Age: 77
End: 2022-05-05
Payer: MEDICARE

## 2022-05-05 DIAGNOSIS — Z95.2 S/P TAVR (TRANSCATHETER AORTIC VALVE REPLACEMENT): Primary | ICD-10-CM

## 2022-05-05 PROCEDURE — 93798 PHYS/QHP OP CAR RHAB W/ECG: CPT

## 2022-05-06 ENCOUNTER — CLINICAL SUPPORT (OUTPATIENT)
Dept: CARDIAC REHAB | Facility: HOSPITAL | Age: 77
End: 2022-05-06
Payer: MEDICARE

## 2022-05-06 DIAGNOSIS — Z95.2 S/P TAVR (TRANSCATHETER AORTIC VALVE REPLACEMENT): Primary | ICD-10-CM

## 2022-05-06 PROCEDURE — 93798 PHYS/QHP OP CAR RHAB W/ECG: CPT

## 2022-05-10 ENCOUNTER — CLINICAL SUPPORT (OUTPATIENT)
Dept: CARDIAC REHAB | Facility: HOSPITAL | Age: 77
End: 2022-05-10
Payer: MEDICARE

## 2022-05-10 DIAGNOSIS — Z95.2 S/P TAVR (TRANSCATHETER AORTIC VALVE REPLACEMENT): Primary | ICD-10-CM

## 2022-05-10 PROCEDURE — 93798 PHYS/QHP OP CAR RHAB W/ECG: CPT

## 2022-05-12 ENCOUNTER — CLINICAL SUPPORT (OUTPATIENT)
Dept: CARDIAC REHAB | Facility: HOSPITAL | Age: 77
End: 2022-05-12
Payer: MEDICARE

## 2022-05-12 DIAGNOSIS — Z95.2 S/P TAVR (TRANSCATHETER AORTIC VALVE REPLACEMENT): Primary | ICD-10-CM

## 2022-05-12 PROCEDURE — 93798 PHYS/QHP OP CAR RHAB W/ECG: CPT

## 2022-05-13 ENCOUNTER — CLINICAL SUPPORT (OUTPATIENT)
Dept: CARDIAC REHAB | Facility: HOSPITAL | Age: 77
End: 2022-05-13
Payer: MEDICARE

## 2022-05-13 DIAGNOSIS — Z95.2 S/P TAVR (TRANSCATHETER AORTIC VALVE REPLACEMENT): Primary | ICD-10-CM

## 2022-05-13 PROCEDURE — 93798 PHYS/QHP OP CAR RHAB W/ECG: CPT

## 2022-05-17 ENCOUNTER — CLINICAL SUPPORT (OUTPATIENT)
Dept: CARDIAC REHAB | Facility: HOSPITAL | Age: 77
End: 2022-05-17
Payer: MEDICARE

## 2022-05-17 DIAGNOSIS — Z95.2 S/P TAVR (TRANSCATHETER AORTIC VALVE REPLACEMENT): Primary | ICD-10-CM

## 2022-05-17 PROCEDURE — 93798 PHYS/QHP OP CAR RHAB W/ECG: CPT

## 2022-05-19 ENCOUNTER — CLINICAL SUPPORT (OUTPATIENT)
Dept: CARDIAC REHAB | Facility: HOSPITAL | Age: 77
End: 2022-05-19
Payer: MEDICARE

## 2022-05-19 DIAGNOSIS — Z95.2 S/P TAVR (TRANSCATHETER AORTIC VALVE REPLACEMENT): Primary | ICD-10-CM

## 2022-05-19 PROCEDURE — 93798 PHYS/QHP OP CAR RHAB W/ECG: CPT

## 2022-05-20 ENCOUNTER — CLINICAL SUPPORT (OUTPATIENT)
Dept: CARDIAC REHAB | Facility: HOSPITAL | Age: 77
End: 2022-05-20
Payer: MEDICARE

## 2022-05-20 DIAGNOSIS — Z95.2 S/P TAVR (TRANSCATHETER AORTIC VALVE REPLACEMENT): Primary | ICD-10-CM

## 2022-05-20 PROCEDURE — 93798 PHYS/QHP OP CAR RHAB W/ECG: CPT

## 2022-05-24 ENCOUNTER — CLINICAL SUPPORT (OUTPATIENT)
Dept: CARDIAC REHAB | Facility: HOSPITAL | Age: 77
End: 2022-05-24
Payer: MEDICARE

## 2022-05-24 DIAGNOSIS — Z95.2 S/P TAVR (TRANSCATHETER AORTIC VALVE REPLACEMENT): Primary | ICD-10-CM

## 2022-05-24 PROCEDURE — 93798 PHYS/QHP OP CAR RHAB W/ECG: CPT

## 2022-05-24 NOTE — PROGRESS NOTES
Cardiac Rehabilitation Plan of Care   60 Day Reassessment          Today's date: 2022   # of Exercise Sessions Completed: 24  Patient name: Leia Stearns      : 1945  Age: 77 y o  MRN: [id number]  Referring Physician: Diana Beltran,*  Cardiologist: Dr Ashley Maradiaga  Provider: Deonte  Clinician: Cam Benson MS, CEP        Dx:   Encounter Diagnosis   Name Primary?  S/P TAVR (transcatheter aortic valve replacement) Yes     Date of onset: 2022      SUMMARY OF PROGRESS:  Mr Darrell Watters continues to attend his cardiac rehabilitation sessions after TAVR on 22  He is attending his sessions regularly 3x/week and has attended 24 sessions in the past 60 days  He continues to report he is doing very well overall with recovery  He has resumed walking 3-4 miles/day and is doing this on a daily basis with coming to rehab  He does not feel he has any limitations  He enjoys the outdoors fishing, hunting, and doing yard work  His main goal for his rehab program is to lose weight with short term goal of -10-15 lbs, and long term goal of reaching 185 lbs  He continues to work towards his personal goals at 60 days  His current weight today is 200 lbs  He acknowledges he needs to control what he is eating to be able to lose weight  He does have a few beers here and there but is trying to be more aware of food choices  He attended education class on heart healthy eating and will be discussing label reading this week for education  He feels his biggest problem with diet right now is snacking on sweets-ice cream  He does mange portion size at this time  He denies depression (PHQ-9=1) and anxiety (BREA-7= 1) currently  No need to reassess PHQ-9/BREA-7 at 60 days  He also reports some stress with family issues but has resolved  He does admit to being impatient, but is also working at improving how he reacts  Walter Gaitan has been attending weekly group education classes on cardiac risk factors    He is completing 40-50 minutes of aerobic exercise and reaching 5 2 METs on different modalities such as the TRM, LifeCycle, UBE, and Nustep  He has also started a strength training program 2x/week  Patient is tolerating increasing workloads well with no concerns  Resting //80 with appropriate hemodynamic response to exercise 132//80  Telemetry continues to show NSR with frequent PVCs  Will continue to educate and progress as tolerated in the next 30 days  Will also be preparing for discharge in the next 4 weeks  Medication compliance: Yes   Comments: Pt reports to be compliant with medications  Fall Risk: Low   Comments: Ambulates with a steady gait with no assist device    EKG Interpretation: NSR with occ to freq   PVCs noted       EXERCISE ASSESSMENT and PLAN    Current Exercise Program in Rehab:       Frequency: 3 days/week Supplement with home exercise 2+ days/wk as tolerated       Minutes: 40-50        METS: 4 0-5 2           HR: 30 beats above resting   RPE: 4-6         Modalities: Treadmill, UBE, Lifecycle, NuStep and Recumbent bike      Exercise Progression 30 Day Goals :    Frequency: 3 days/week of cardiac rehab     Supplement with home exercise 2+ days/wk as tolerated    Minutes: 50-55                          >150 mins/wk of moderate intensity exercise   METS: 4 8-5 5   HR: 30 beats above resting    RPE: 4-6   Modalities: Treadmill, Airdyne bike, UBE, Lifecycle, Elliptical, Rower, NuStep and Recumbent bike    Strength trainin-3 days / week  12-15 repetitions  1-2 sets per modality   Will be added following at least 8 weeks post surgery and 8-10 monitored sessions  Will be added following 2-3 weeks of monitored exercise sessions   Modalities: Pull Downs, Lateral Raise, Arm Extension, Arm Curl, Upright Rows, Front Raises, Shoulder Shrugs and leg ext    Home Exercise: Type: walking, Frequency: 7 days/week, Duration: 60 mins, Distance 3-4 miles    Goals: 10% improvement in functional capacity - based on max METs achieved in fitness assessment, improved DASI score by 10%, Increase in exercise capacity by 40% - based on peak METs tolerated in cardiac rehab exercise session, Exercise 5 days/wk, >150mins/wk of moderate intensity exercise, Resume ADLs with increased strength and Attend Rehab regularly    Progression Toward Goals:  Pt is progressing and showing improvement  toward the following goals:  increasing functional METs, attending rehab regularly 3x/week, started strength training program, reaching >150 mins/week of moderate aerobic exercise, resumed ADLs, and attending weekly group education classes  , Patient will continue to increase intensities and duration on different modalties and continue strength traing program 2x/week in the next 30 days, Will continue to educate and progress as tolerated  Education: benefit of exercise for CAD risk factors, home exercise guidelines, AHA guidelines to achieve >150 mins/wk of moderate exercise and RPE scale   Plan:education on home exercise guidelines, home exercise 30+ mins 2 days opposite CR and Education class: Risk Factors for Heart Disease  Readiness to change: Maintenance: (Maintaining the behavior change)      NUTRITION ASSESSMENT AND PLAN    Weight control:    Starting weight: 202 lb   Current weight:   200 lbs   Waist circumference:    Startin"   Current:      Diabetes: Pre-diabetes  A1c: 5 4    last measured: 2022    Lipid management: Discussed diet and lipid management and Last lipid profile 10/27/2021  Chol 185    HDL 65      Goals:LDL <100, HDL >40, TRG <150, CHOL <200, decreased body fat% <25%   (M), reduced waist circumference <40 inches (M), Wt  loss 1-2 ppw,  goal of -10-15 lbs   and 2 5-5%  wt loss    Progression Toward Goals: Pt is progressing and showing improvement  toward the following goals:  aware of food choices and knows what to do    , Pt has not made progress toward the following goals: maintaining weight, no weight loss (personal goal)   , Patient will decrease portion sizes and educate on weight loss strategies in the next 30 days, Will continue to educate and progress as tolerated  Education: heart healthy eating  low sodium diet  hydration  nutrition for  lipid management  wt  loss   healthy choices while dining out  portion control  Plan: Education class: Reading Food Labels and Education Class: Heart Healthy Eating  Readiness to change: Preparation:  (Getting ready to change)       PSYCHOSOCIAL ASSESSMENT AND PLAN    Emotional:  Depression assessment:  PHQ-9 = 1-4 = Minimal Depression            Anxiety measure:  BREA-7 = 0-4  = Not anxious  Self-reported stress level:  2  Social support: Excellent    Goals:  increased energy and take time to relax    Progression Toward Goals: Pt is progressing and showing improvement  toward the following goals:  denies depression and anxiety  Positive attitude and motivated each session  Managing stress  , Patient will continue to manage family stress and attend group education classes in the next 30 days, Will continue to educate and progress as tolerated      Education: signs/sxs of depression, benefits of a positive support system and stress management techniques  Plan: Practice relaxation techniques, Exercise, Spend time outdoors, Enjoy a hobby and Enjoy family  Readiness to change: Maintenance: (Maintaining the behavior change)      OTHER CORE COMPONENTS     Tobacco:   Social History     Tobacco Use   Smoking Status Never Smoker   Smokeless Tobacco Never Used       Tobacco Use Intervention:   N/A:  Patient is a non-smoker     Anginal Symptoms:  None   NTG use: No prescription    Blood pressure:    Restin//80   Exercise: 132//80    Goals: consistent BP < 130/80, reduced dietary sodium <2300mg, moderate intensity exercise >150 mins/wk, medication compliance and reduce number of medications  needed for BP control    Progression Toward Goals: Pt is progressing and showing improvement  toward the following goals:  blood pressures within normal limits at rest and appropriate hemodynamic response to exercise and >150 mins/week of moderate intensity exercise  D/C blood thinner     , Patient will continue to montior dietary intakes/sodium intake/work on weight loss goal  in the next 30 days, Will continue to educate and progress as tolerated      Education:  understanding high blood pressure and it's relationship to CAD and low sodium diet and HTN  Plan: Class: Understanding Heart Disease and Class: Common Heart Medications  Readiness to change: Action:  (Changing behavior)

## 2022-05-26 ENCOUNTER — CLINICAL SUPPORT (OUTPATIENT)
Dept: CARDIAC REHAB | Facility: HOSPITAL | Age: 77
End: 2022-05-26
Payer: MEDICARE

## 2022-05-26 DIAGNOSIS — Z95.2 S/P TAVR (TRANSCATHETER AORTIC VALVE REPLACEMENT): Primary | ICD-10-CM

## 2022-05-26 PROCEDURE — 93798 PHYS/QHP OP CAR RHAB W/ECG: CPT

## 2022-05-27 ENCOUNTER — CLINICAL SUPPORT (OUTPATIENT)
Dept: CARDIAC REHAB | Facility: HOSPITAL | Age: 77
End: 2022-05-27
Payer: MEDICARE

## 2022-05-27 DIAGNOSIS — Z95.2 S/P TAVR (TRANSCATHETER AORTIC VALVE REPLACEMENT): Primary | ICD-10-CM

## 2022-05-27 PROCEDURE — 93798 PHYS/QHP OP CAR RHAB W/ECG: CPT

## 2022-05-31 ENCOUNTER — CLINICAL SUPPORT (OUTPATIENT)
Dept: CARDIAC REHAB | Facility: HOSPITAL | Age: 77
End: 2022-05-31
Payer: MEDICARE

## 2022-05-31 DIAGNOSIS — Z95.2 S/P TAVR (TRANSCATHETER AORTIC VALVE REPLACEMENT): Primary | ICD-10-CM

## 2022-05-31 PROCEDURE — 93798 PHYS/QHP OP CAR RHAB W/ECG: CPT

## 2022-06-02 ENCOUNTER — CLINICAL SUPPORT (OUTPATIENT)
Dept: CARDIAC REHAB | Facility: HOSPITAL | Age: 77
End: 2022-06-02
Payer: MEDICARE

## 2022-06-02 DIAGNOSIS — Z95.2 S/P TAVR (TRANSCATHETER AORTIC VALVE REPLACEMENT): Primary | ICD-10-CM

## 2022-06-02 PROCEDURE — 93798 PHYS/QHP OP CAR RHAB W/ECG: CPT

## 2022-06-03 ENCOUNTER — CLINICAL SUPPORT (OUTPATIENT)
Dept: CARDIAC REHAB | Facility: HOSPITAL | Age: 77
End: 2022-06-03
Payer: MEDICARE

## 2022-06-03 DIAGNOSIS — Z95.2 S/P TAVR (TRANSCATHETER AORTIC VALVE REPLACEMENT): Primary | ICD-10-CM

## 2022-06-03 PROCEDURE — 93798 PHYS/QHP OP CAR RHAB W/ECG: CPT

## 2022-06-07 ENCOUNTER — CLINICAL SUPPORT (OUTPATIENT)
Dept: CARDIAC REHAB | Facility: HOSPITAL | Age: 77
End: 2022-06-07
Payer: MEDICARE

## 2022-06-07 DIAGNOSIS — Z95.2 S/P TAVR (TRANSCATHETER AORTIC VALVE REPLACEMENT): Primary | ICD-10-CM

## 2022-06-07 PROCEDURE — 93798 PHYS/QHP OP CAR RHAB W/ECG: CPT

## 2022-06-09 ENCOUNTER — CLINICAL SUPPORT (OUTPATIENT)
Dept: CARDIAC REHAB | Facility: HOSPITAL | Age: 77
End: 2022-06-09
Payer: MEDICARE

## 2022-06-09 DIAGNOSIS — Z95.2 S/P TAVR (TRANSCATHETER AORTIC VALVE REPLACEMENT): Primary | ICD-10-CM

## 2022-06-09 PROCEDURE — 93798 PHYS/QHP OP CAR RHAB W/ECG: CPT

## 2022-06-10 ENCOUNTER — CLINICAL SUPPORT (OUTPATIENT)
Dept: CARDIAC REHAB | Facility: HOSPITAL | Age: 77
End: 2022-06-10
Payer: MEDICARE

## 2022-06-10 DIAGNOSIS — Z95.2 S/P TAVR (TRANSCATHETER AORTIC VALVE REPLACEMENT): Primary | ICD-10-CM

## 2022-06-10 PROCEDURE — 93798 PHYS/QHP OP CAR RHAB W/ECG: CPT

## 2022-06-14 ENCOUNTER — CLINICAL SUPPORT (OUTPATIENT)
Dept: CARDIAC REHAB | Facility: HOSPITAL | Age: 77
End: 2022-06-14
Payer: MEDICARE

## 2022-06-14 DIAGNOSIS — Z95.2 S/P TAVR (TRANSCATHETER AORTIC VALVE REPLACEMENT): Primary | ICD-10-CM

## 2022-06-14 PROCEDURE — 93798 PHYS/QHP OP CAR RHAB W/ECG: CPT

## 2022-06-16 ENCOUNTER — CLINICAL SUPPORT (OUTPATIENT)
Dept: CARDIAC REHAB | Facility: HOSPITAL | Age: 77
End: 2022-06-16
Payer: MEDICARE

## 2022-06-16 DIAGNOSIS — Z95.2 S/P TAVR (TRANSCATHETER AORTIC VALVE REPLACEMENT): Primary | ICD-10-CM

## 2022-06-16 PROCEDURE — 93798 PHYS/QHP OP CAR RHAB W/ECG: CPT

## 2022-06-17 ENCOUNTER — CLINICAL SUPPORT (OUTPATIENT)
Dept: CARDIAC REHAB | Facility: HOSPITAL | Age: 77
End: 2022-06-17
Payer: MEDICARE

## 2022-06-17 DIAGNOSIS — Z95.2 S/P TAVR (TRANSCATHETER AORTIC VALVE REPLACEMENT): Primary | ICD-10-CM

## 2022-06-17 PROCEDURE — 93798 PHYS/QHP OP CAR RHAB W/ECG: CPT

## 2022-06-21 ENCOUNTER — CLINICAL SUPPORT (OUTPATIENT)
Dept: CARDIAC REHAB | Facility: HOSPITAL | Age: 77
End: 2022-06-21
Payer: MEDICARE

## 2022-06-21 ENCOUNTER — APPOINTMENT (OUTPATIENT)
Dept: CARDIAC REHAB | Facility: HOSPITAL | Age: 77
End: 2022-06-21
Payer: MEDICARE

## 2022-06-21 DIAGNOSIS — Z95.2 S/P TAVR (TRANSCATHETER AORTIC VALVE REPLACEMENT): Primary | ICD-10-CM

## 2022-06-21 PROCEDURE — 93798 PHYS/QHP OP CAR RHAB W/ECG: CPT

## 2022-06-21 NOTE — PROGRESS NOTES
Cardiac Rehabilitation Plan of Care   90 Day Reassessment & Discharge           Today's date: 2022   # of Exercise Sessions Completed: 36  Patient name: Barbie Gama      : 1945  Age: 77 y o  MRN: [id number]  Referring Physician: Di Dempsey,*  Cardiologist: Dr Osiel Xiong  Provider: Deonte  Clinician: Jeanne Salazar MS, CEP         Dx:   Encounter Diagnosis   Name Primary?  S/P TAVR (transcatheter aortic valve replacement) Yes     Date of onset: 2022      SUMMARY OF PROGRESS:  Mr Sheldon Marcano attended 36 sessions his cardiac rehabilitation sessions after TAVR on 22 and has been discharged today  He continues to report he is doing very well overall with recovery  He has resumed walking 3-4 miles/day and is doing this on a daily basis with coming to rehab  He plans to continue on with a more formal exercise of joining a local gym  He enjoys the outdoors fishing, hunting, and doing yard work  His main goal for his rehab program is to lose weight with short term goal of -10-15 lbs, and long term goal of reaching 185 lbs  He did not meet his short term goal but has lost 3-4 lbs since coming to rehab  He plans to continue to work towards his weight loss goal following discharge  His current weight today is 199 lbs  He acknowledges he needs to control what he is eating to be able to lose weight  He does have a few beers here and there but is trying to be more aware of food choices  He feels his biggest problem with diet right now is snacking on sweets-ice cream  He does mange portion size at this time  He attended all the cardiac risk factor education as well  He denies depression (PHQ-9=1) and anxiety (BREA-7= 1) currently  No change to PHQ-9 and BREA-7 at this time  He is managing stress well with no concerns  He does admit to being impatient, but is also working at improving how he reacts    He completed 50-60 minutes of aerobic exercise and reaching 5 7-6 5 METs on different modalities such as the TRM, LifeCycle, UBE, and Nustep  He also completed a strength training program 2x/week  Patient tolerated increasing workloads well with no concerns  Resting //70 with appropriate hemodynamic response to exercise 138//80  He hopes to eventually get off of Metoprolol at his next Cardiologist appt in the September  Telemetry continues to show NSR with occ/frequent PVCs  Patient completed his post submax ETT terminating at 15 minutes at 7 5 METs  Improved from initial 74 4%  No symptoms reported and RPE of 5  Patient did a great job in rehab and enjoyed coming  Attached Outcomes report  Reviewed discharge instructions and verbalized understanding  Medication compliance: Yes   Comments: Pt reports to be compliant with medications  Fall Risk: Low   Comments: Ambulates with a steady gait with no assist device    EKG Interpretation: NSR with occ to freq   PVCs noted       EXERCISE ASSESSMENT and PLAN    Current Exercise Program in Rehab:       Frequency: 3 days/week Supplement with home exercise 2+ days/wk as tolerated       Minutes: 50-60       METS: 5 7-6 5          HR: 30 beats above resting   RPE: 4-6         Modalities: Treadmill, UBE, Lifecycle, NuStep and Recumbent bike      Strength trainin-3 days / week  12-15 repetitions  1-2 sets per modality   Will be added following at least 8 weeks post surgery and 8-10 monitored sessions  Will be added following 2-3 weeks of monitored exercise sessions   Modalities: Pull Downs, Lateral Raise, Arm Extension, Arm Curl, Upright Rows, Front Raises, Shoulder Shrugs and leg ext    Home Exercise: Type: walking, Frequency: 7 days/week, Duration: 60 mins, Distance 3-4 miles, WIll be joining [employer] as well    Goals: 10% improvement in functional capacity - based on max METs achieved in fitness assessment, improved DASI score by 10%, Increase in exercise capacity by 40% - based on peak METs tolerated in cardiac rehab exercise session, Exercise 5 days/wk, >150mins/wk of moderate intensity exercise, Resume ADLs with increased strength and Attend Rehab regularly    Progression Toward Goals:  Goals met: Improved 74 4% on submaxx ETT, improved DASI score 43 1%, increased in overall exercise tolerance/functional METs on different modalities, attended rehab 3x/week regularly, >150 mins/week of moderate intensity exercise with rehab and home walking program, home exercise plan for discharge, resumed ADLs, completing strength training program 2x/week, and increased overall endurance and strength  , Patient will be encouraged to focus on lifestyle modifications following discharge  Education: benefit of exercise for CAD risk factors, home exercise guidelines, AHA guidelines to achieve >150 mins/wk of moderate exercise and RPE scale   Plan:education on home exercise guidelines, home exercise 30+ mins 2 days opposite CR and Education class: Risk Factors for Heart Disease  Readiness to change: Maintenance: (Maintaining the behavior change)      NUTRITION ASSESSMENT AND PLAN    Weight control:    Starting weight: 202 lb   Current weight:   199 lbs   Waist circumference:    Startin"   Current:      Diabetes: Pre-diabetes  A1c: 5 4    last measured: 2022    Lipid management: Discussed diet and lipid management and Last lipid profile 10/27/2021  Chol 185    HDL 65      Goals:LDL <100, HDL >40, TRG <150, CHOL <200, decreased body fat% <25%   (M), reduced waist circumference <40 inches (M), Wt  loss 1-2 ppw,  goal of -10-15 lbs  and 2 5-5%  wt loss    Progression Toward Goals: Goals not met: did not meet short term weight loss goal - lost 3-4 lbs since intial evalaution  , Goals met: aware of food choices and watching portion sizes  Will continue to work on weight loss goal following discharge , Patient will be encouraged to focus on lifestyle modifications following discharge      Education: heart healthy eating  low sodium diet  hydration  nutrition for  lipid management  wt  loss   healthy choices while dining out  portion control  Plan: Education class: Reading Food Labels and Education Class: Heart Healthy Eating  Readiness to change: Action:  (Changing behavior)      PSYCHOSOCIAL ASSESSMENT AND PLAN    Emotional:  Depression assessment:  PHQ-9 = 1-4 = Minimal Depression            Anxiety measure:  BREA-7 = 0-4  = Not anxious  Self-reported stress level:  2  Social support: Excellent    Goals:  increased energy and take time to relax    Progression Toward Goals: Goals met: denies depression and anxiety  PHQ-9 and BREA-7 score stayed low score  Positive attiude and motivated  Managing stress well  , Patient will be encouraged to focus on lifestyle modifications following discharge      Education: signs/sxs of depression, benefits of a positive support system and stress management techniques  Plan: Practice relaxation techniques, Exercise, Spend time outdoors, Enjoy a hobby and Enjoy family  Readiness to change: Maintenance: (Maintaining the behavior change)      OTHER CORE COMPONENTS     Tobacco:   Social History     Tobacco Use   Smoking Status Never Smoker   Smokeless Tobacco Never Used       Tobacco Use Intervention:   N/A:  Patient is a non-smoker     Anginal Symptoms:  None   NTG use: No prescription    Blood pressure:    Restin//70   Exercise: 138//80    Goals: consistent BP < 130/80, reduced dietary sodium <2300mg, moderate intensity exercise >150 mins/wk, medication compliance and reduce number of medications  needed for BP control    Progression Toward Goals: Goals met: BPs within normal limits at rest and appropriate hemodynamic response to exercise, hopes to get off of Metoprolol at next MD visit in September, achieving moderate intensity exercise >150 mins/week, medication compliance, and watching sodium intake  , Patient will be encouraged to focus on lifestyle modifications following discharge      Education:  understanding high blood pressure and it's relationship to CAD and low sodium diet and HTN  Plan: Class: Understanding Heart Disease and Class: Common Heart Medications  Readiness to change: Action:  (Changing behavior)

## 2022-06-30 ENCOUNTER — OFFICE VISIT (OUTPATIENT)
Dept: URGENT CARE | Facility: CLINIC | Age: 77
End: 2022-06-30
Payer: MEDICARE

## 2022-06-30 VITALS
DIASTOLIC BLOOD PRESSURE: 77 MMHG | SYSTOLIC BLOOD PRESSURE: 170 MMHG | WEIGHT: 198 LBS | HEART RATE: 63 BPM | RESPIRATION RATE: 16 BRPM | BODY MASS INDEX: 29.24 KG/M2 | OXYGEN SATURATION: 97 % | TEMPERATURE: 97 F

## 2022-06-30 DIAGNOSIS — S61.211A LACERATION OF LEFT INDEX FINGER WITHOUT FOREIGN BODY WITHOUT DAMAGE TO NAIL, INITIAL ENCOUNTER: Primary | ICD-10-CM

## 2022-06-30 PROCEDURE — 99213 OFFICE O/P EST LOW 20 MIN: CPT | Performed by: PHYSICIAN ASSISTANT

## 2022-06-30 PROCEDURE — 90715 TDAP VACCINE 7 YRS/> IM: CPT

## 2022-06-30 PROCEDURE — 90471 IMMUNIZATION ADMIN: CPT | Performed by: PHYSICIAN ASSISTANT

## 2022-06-30 PROCEDURE — G0463 HOSPITAL OUTPT CLINIC VISIT: HCPCS | Performed by: PHYSICIAN ASSISTANT

## 2022-06-30 PROCEDURE — 12001 RPR S/N/AX/GEN/TRNK 2.5CM/<: CPT | Performed by: PHYSICIAN ASSISTANT

## 2022-06-30 RX ORDER — CEPHALEXIN 500 MG/1
500 CAPSULE ORAL EVERY 12 HOURS SCHEDULED
Qty: 14 CAPSULE | Refills: 0 | Status: SHIPPED | OUTPATIENT
Start: 2022-06-30 | End: 2022-07-07

## 2022-06-30 RX ORDER — LIDOCAINE HYDROCHLORIDE 10 MG/ML
5 INJECTION, SOLUTION INFILTRATION; PERINEURAL ONCE
Status: COMPLETED | OUTPATIENT
Start: 2022-06-30 | End: 2022-06-30

## 2022-06-30 RX ORDER — GINSENG 100 MG
1 CAPSULE ORAL ONCE
Status: COMPLETED | OUTPATIENT
Start: 2022-06-30 | End: 2022-06-30

## 2022-06-30 RX ADMIN — LIDOCAINE HYDROCHLORIDE 5 ML: 10 INJECTION, SOLUTION INFILTRATION; PERINEURAL at 16:24

## 2022-06-30 RX ADMIN — Medication 1 SMALL APPLICATION: at 16:25

## 2022-06-30 NOTE — PATIENT INSTRUCTIONS
Keep dressing on the wound for 48 hours  Do not get dressing wet  After 48 hours, you may remove dressing and shower allowing water to run off the wound  Do not soak in water (bath, hot tub, pool, ect) as this will increase risk of infection  Keep area clean  Monitor for signs of infection including redness, swelling, drainage, streaking up the extremity and fever  Seek medical attention if you have these symptoms  Return to urgent care PCP for suture/staple removal in 10-12  days  Go to ER if symptoms become severe

## 2022-06-30 NOTE — PROGRESS NOTES
[facility]        NAME: Sita Martinez is a 77 y o  male  : 1945    MRN: [id number]  DATE: 2022  TIME: 4:11 PM    Assessment and Plan   Laceration of left index finger without foreign body without damage to nail, initial encounter [S61 [id number]]  1  Laceration of left index finger without foreign body without damage to nail, initial encounter  Tdap Vaccine greater than or equal to 6yo    cephalexin (KEFLEX) 500 mg capsule    Laceration repair    lidocaine (XYLOCAINE) 1 % injection 5 mL    bacitracin topical ointment 1 small application     6 sutures    Patient Instructions   Keep dressing on the wound for 48 hours  Do not get dressing wet  After 48 hours, you may remove dressing and shower allowing water to run off the wound  Do not soak in water (bath, hot tub, pool, ect) as this will increase risk of infection  Keep area clean  Monitor for signs of infection including redness, swelling, drainage, streaking up the extremity and fever  Seek medical attention if you have these symptoms  Return to urgent care PCP for suture/staple removal in 10-12  days  Follow up with PCP in 3-5 days  Proceed to  ER if symptoms worsen  Chief Complaint     Chief Complaint   Patient presents with    Finger Laceration     Pt cut Left index finger with hedge trimmers  Pt not utd with tetanus  History of Present Illness       Patient is a 78-year-old male with significant past medical history of CAD, CHF, hyperlipidemia, and prediabetes presents the office after sustaining laceration to distal palmar aspect of left index finger proximally 3 hours ago using a   Denies pain  Reports he tried to stop the bleeding but was unsuccessful  Last tetanus unknown  Review of Systems   Review of Systems   Skin: Positive for wound  Neurological: Negative for numbness           Current Medications       Current Outpatient Medications:     cephalexin (KEFLEX) 500 mg capsule, Take 1 capsule (500 mg total) by mouth every 12 (twelve) hours for 7 days, Disp: 14 capsule, Rfl: 0    acetaminophen (TYLENOL) 325 mg tablet, Take 2 tablets (650 mg total) by mouth every 6 (six) hours as needed for fever (temperature greater than 101 F) (Patient not taking: Reported on 4/25/2022 ), Disp: , Rfl: 0    aspirin 81 mg chewable tablet, Chew 1 tablet (81 mg total) daily, Disp: 30 tablet, Rfl: 2    Lumigan 0 01 % ophthalmic drops, , Disp: , Rfl:     metoprolol tartrate (LOPRESSOR) 25 mg tablet, Take 0 5 tablets (12 5 mg total) by mouth every 12 (twelve) hours, Disp: 90 tablet, Rfl: 3    simvastatin (ZOCOR) 10 mg tablet, Take 1 tablet by mouth once daily, Disp: 90 tablet, Rfl: 0    Current Facility-Administered Medications:     bacitracin topical ointment 1 small application, 1 small application, Topical, Once, William Lowe PA-C    lidocaine (XYLOCAINE) 1 % injection 5 mL, 5 mL, Infiltration, Once, William Lowe PA-C    Current Allergies     Allergies as of 06/30/2022    (No Known Allergies)            The following portions of the patient's history were reviewed and updated as appropriate: allergies, current medications, past family history, past medical history, past social history, past surgical history and problem list      Past Medical History:   Diagnosis Date    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, 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    CAD (coronary artery disease)     Cataract     CHF (congestive heart failure) ([facility] [address] )     Glaucoma     Hyperlipidemia     Moderate aortic insufficiency     Prediabetes     Severe aortic stenosis     Sinus bradycardia     Umbilical hernia        Past Surgical History:   Procedure Laterality Date    ARTHROSCOPY KNEE Right     BUNIONECTOMY Right     CARDIAC CATHETERIZATION Bilateral 2/14/2022    Procedure: Cardiac catheterization;  Surgeon: Parker Duane, MD;  [facility];   Service: Cardiology    CARDIAC CATHETERIZATION N/A 2/22/2022    Procedure: CARDIAC TAVR;  Surgeon: Karlos Simpson MD;  Location: [facility];  Service: Cardiology    HEMORRHOID SURGERY      NC ECHO TRANSESOPHAG R-T 2D W/PRB IMG ACQUISJ I&R N/A 2/22/2022    Procedure: TRANSESOPHAGEAL ECHOCARDIOGRAM (ELISSA); Surgeon: Sagrario Pérez MD;  Location: [facility];  Service: Cardiac Surgery    NC REPLACE AORTIC VALVE OPENFEMORAL ARTERY APPROACH N/A 2/22/2022    Procedure: REPLACEMENT AORTIC VALVE TRANSCATHETER (TAVR) TRANSFEMORAL W/ 29MM SARKAR PAULO S3 ULTRA VALVE(ACCESS ON LEFT); Surgeon: Sagrario Pérez MD;  Location: [facility];  Service: Cardiac Surgery    SHOULDER SURGERY  2012    TONSILLECTOMY      childhood       Family History   Problem Relation Age of Onset    Colon cancer Mother     Other Father         heart problem    Parkinsonism Family          Medications have been verified  Objective   /77   Pulse 63   Temp (!) 97 °F (36 1 °C)   Resp 16   Wt 89 8 kg (198 lb)   SpO2 97%   BMI 29 24 kg/m²   No LMP for male patient  Physical Exam     Physical Exam  Vitals and nursing note reviewed  Constitutional:       Appearance: He is well-developed  HENT:      Head: Normocephalic and atraumatic  Right Ear: External ear normal       Left Ear: External ear normal       Nose: Nose normal    Eyes:      General: Lids are normal       Conjunctiva/sclera: Conjunctivae normal    Skin:     General: Skin is warm and dry  Capillary Refill: Capillary refill takes less than 2 seconds  Findings: Wound (1 5cm stellate laceration to the palmar aspect distal phalanx of left index finger  No visible tendon or vascular involvement  FAROM of digit  <2 sec cap refill ) present  Neurological:      Mental Status: He is alert  Laceration repair    Date/Time: 6/30/2022 2:50 PM  Performed by: Thomas Kaur PA-C  Authorized by: Thomas Kaur PA-C   Consent: Verbal consent obtained    Risks and benefits: risks, benefits and alternatives were discussed  Consent given by: patient  Patient understanding: patient states understanding of the procedure being performed  Patient identity confirmed: verbally with patient  Time out: Immediately prior to procedure a "time out" was called to verify the correct patient, procedure, equipment, support staff and site/side marked as required  Body area: upper extremity  Location details: left index finger  Laceration length: 1 5 cm  Foreign bodies: no foreign bodies  Tendon involvement: none  Nerve involvement: none  Vascular damage: no  Anesthesia: digital block    Anesthesia:  Local Anesthetic: lidocaine 1% without epinephrine  Anesthetic total: 6 mL      Procedure Details:  Preparation: Patient was prepped and draped in the usual sterile fashion    Irrigation solution: saline  Irrigation method: syringe  Amount of cleaning: standard  Debridement: none  Degree of undermining: none  Skin closure: 4-0 nylon  Number of sutures: 4  Approximation: close  Approximation difficulty: simple  Dressing: antibiotic ointment and gauze roll  Patient tolerance: patient tolerated the procedure well with no immediate complications

## 2022-07-11 ENCOUNTER — OFFICE VISIT (OUTPATIENT)
Dept: URGENT CARE | Facility: CLINIC | Age: 77
End: 2022-07-11
Payer: MEDICARE

## 2022-07-11 DIAGNOSIS — Z48.02 ENCOUNTER FOR REMOVAL OF SUTURES: Primary | ICD-10-CM

## 2022-07-11 PROCEDURE — G0463 HOSPITAL OUTPT CLINIC VISIT: HCPCS | Performed by: PHYSICIAN ASSISTANT

## 2022-07-11 PROCEDURE — 99213 OFFICE O/P EST LOW 20 MIN: CPT | Performed by: PHYSICIAN ASSISTANT

## 2022-07-11 NOTE — PROGRESS NOTES
Suture removal    Date/Time: 2022 10:19 AM  Performed by: Keaton Cahng PA-C  Authorized by: Keaton Chang PA-C   Universal Protocol:  Consent: Verbal consent obtained  Risks and benefits: risks, benefits and alternatives were discussed  Consent given by: patient  Patient understanding: patient states understanding of the procedure being performed  Patient consent: the patient's understanding of the procedure matches consent given  Patient identity confirmed: verbally with patient        Patient location:  Clinic  Location:     Laterality:  Left    Location:  Upper extremity    Upper extremity location:  Hand    Hand location:  L index finger  Procedure details: Tools used:  Suture removal kit    Wound appearance:  No sign(s) of infection, good wound healing and clean    Number of sutures removed:  6  Post-procedure details:     Post-removal:  No dressing applied    Patient tolerance of procedure: Tolerated well, no immediate complications        [facility]        NAME: Rhianna Pak is a 77 y o  male  : 1945    MRN: [id number]  DATE: 2022  TIME: 10:28 AM    Assessment and Plan   Encounter for removal of sutures [Z48 02]  1  Encounter for removal of sutures           Patient Instructions       Follow up with PCP in 3-5 days  Proceed to  ER if symptoms worsen  Chief Complaint   No chief complaint on file  History of Present Illness       Pt presents for suture removal of 6 sutures to L index finger  Pt denies any signs of infection  Pt cut his finger while trimming his hedges on [address]  Review of Systems   Review of Systems   Constitutional: Negative for chills and fever  Respiratory: Negative for chest tightness, shortness of breath and wheezing  Cardiovascular: Negative for chest pain and palpitations  Gastrointestinal: Negative for nausea and vomiting  Musculoskeletal: Negative for arthralgias and myalgias  Skin: Positive for wound  Negative for rash  Neurological: Negative for dizziness, weakness, light-headedness, numbness and headaches  All other systems reviewed and are negative  Current Medications       Current Outpatient Medications:     acetaminophen (TYLENOL) 325 mg tablet, Take 2 tablets (650 mg total) by mouth every 6 (six) hours as needed for fever (temperature greater than 101 F) (Patient not taking: Reported on 4/25/2022 ), Disp: , Rfl: 0    aspirin 81 mg chewable tablet, Chew 1 tablet (81 mg total) daily, Disp: 30 tablet, Rfl: 2    Lumigan 0 01 % ophthalmic drops, , Disp: , Rfl:     metoprolol tartrate (LOPRESSOR) 25 mg tablet, Take 0 5 tablets (12 5 mg total) by mouth every 12 (twelve) hours, Disp: 90 tablet, Rfl: 3    simvastatin (ZOCOR) 10 mg tablet, Take 1 tablet by mouth once daily, Disp: 90 tablet, Rfl: 0    Current Allergies     Allergies as of 07/11/2022    (No Known Allergies)            The following portions of the patient's history were reviewed and updated as appropriate: allergies, current medications, past family history, past medical history, past social history, past surgical history and problem list      Past Medical History:   Diagnosis Date    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, 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    CAD (coronary artery disease)     Cataract     CHF [facility]congestive heart failure) ([facility] [address] )     Glaucoma     Hyperlipidemia     Moderate aortic insufficiency     Prediabetes     Severe aortic stenosis     Sinus bradycardia     Umbilical hernia        Past Surgical History:   Procedure Laterality Date    ARTHROSCOPY KNEE Right     BUNIONECTOMY Right     CARDIAC CATHETERIZATION Bilateral 2/14/2022    Procedure: Cardiac catheterization;  Surgeon: Kath Herrera MD;  [facility];   Service: Cardiology    CARDIAC CATHETERIZATION N/A 2/22/2022    Procedure: CARDIAC TAVR;  Surgeon: Sher Nielsen MD;  [facility] MA[facility];  Service: Cardiology    HEMORRHOID SURGERY  IA ECHO TRANSESOPHAG R-T 2D W/PRB IMG ACQUISJ I&R N/A 2/22/2022    Procedure: TRANSESOPHAGEAL ECHOCARDIOGRAM (ELISSA); Surgeon: Jo Ann Upton MD;  [facility];  Service: Cardiac Surgery    IA REPLACE AORTIC VALVE OPENFEMORAL ARTERY APPROACH N/A 2/22/2022    Procedure: REPLACEMENT AORTIC VALVE TRANSCATHETER (TAVR) TRANSFEMORAL W/ 29MM SARKAR PAULO S3 ULTRA VALVE(ACCESS ON LEFT); Surgeon: Jo Ann Upton MD;  [facility];  Service: Cardiac Surgery    SHOULDER SURGERY  2012    TONSILLECTOMY      childhood       Family History   Problem Relation Age of Onset    Colon cancer Mother     Other Father         heart problem    Parkinsonism Family          Medications have been verified  Objective   There were no vitals taken for this visit  No LMP for male patient  Physical Exam     Physical Exam  Vitals and nursing note reviewed  Constitutional:       General: He is not in acute distress  Appearance: He is well-developed  He is not diaphoretic  HENT:      Head: Normocephalic and atraumatic  Cardiovascular:      Pulses: Normal pulses  Pulmonary:      Effort: Pulmonary effort is normal    Musculoskeletal:         General: Normal range of motion  Left hand: Laceration (1 5 cm laceration with 6 sutures in place on pad of L index finger) present  Skin:     General: Skin is warm and dry  Capillary Refill: Capillary refill takes less than 2 seconds  Findings: Laceration present  Neurological:      Mental Status: He is alert and oriented to person, place, and time

## 2022-07-11 NOTE — PATIENT INSTRUCTIONS
Stitches Removal   AMBULATORY CARE:   What you need to know about stitches removal:  Stitches are usually removed within 14 days, depending on the location of the wound  Your healthcare provider will tell you when to return to have your stitches removed  Your provider will use sterile forceps or tweezers to  the knot of each stitch  He or she will cut the stitch with scissors and pull the stitch out  You may feel a slight tug as the stitch comes out  Seek care immediately if:   Your wound splits open or is starting to come apart  You suddenly cannot move your injured joint  You have sudden numbness around your wound  You see red streaks coming from your wound  Contact your healthcare provider if:   You have a fever and chills  Your wound is red, warm, swollen, or leaking pus  There is a bad smell coming from your wound  You have increased pain in the wound area  You have questions or concerns about your condition or care  Care for the area after the stitches are removed:   Do not pull medical tape off  Your provider may place small strips of medical tape across your wound after the stitches have been removed  These strips will peel and fall of on their own  Do not pull them off  Clean the area as directed  Carefully wash the area with soap and water  Pat the area dry with a clean towel  Check the area for signs of infection, such as redness, swelling, or pus  Also check that the wound is not coming apart  Protect your wound  Your wound can swell, bleed, or split open if it is stretched or bumped  You may need to wear a bandage that supports your wound until it is completely healed  Care for a scar  You may have a scar after the stitches are removed  Use sunblock if the area is exposed to the sun  Apply it every day after the stitches are removed  This will help prevent skin discoloration   Talk to your healthcare provider about medicines you can use to make the scar less visible  Some medicines are available without a prescription  Follow up with your healthcare provider as directed: You may need to return in 3 to 5 days if the stitches are on your face  Stitches on your scalp need to be removed after 7 to 14 days  Stitches over joints may remain in place up to 14 days  Write down your questions so you remember to ask them during your visits  © Copyright [employer] 2022 Information is for End User's use only and may not be sold, redistributed or otherwise used for commercial purposes  All illustrations and images included in CareNotes® are the copyrighted property of [employer]  or [facility] [employer]   The above information is an  only  It is not intended as medical advice for individual conditions or treatments  Talk to your doctor, nurse or pharmacist before following any medical regimen to see if it is safe and effective for you

## 2022-08-09 DIAGNOSIS — E78.5 HYPERLIPIDEMIA, UNSPECIFIED HYPERLIPIDEMIA TYPE: ICD-10-CM

## 2022-08-09 RX ORDER — SIMVASTATIN 10 MG
TABLET ORAL
Qty: 90 TABLET | Refills: 0 | Status: SHIPPED | OUTPATIENT
Start: 2022-08-09

## 2022-08-26 ENCOUNTER — RA CDI HCC (OUTPATIENT)
Dept: OTHER | Facility: HOSPITAL | Age: 77
End: 2022-08-26

## 2022-08-26 NOTE — PROGRESS NOTES
[facility]  coding opportunities          Chart Reviewed number of suggestions sent to Provider: 1     Patients Insurance     Medicare Insurance: Medicare        [id number]

## 2022-09-02 ENCOUNTER — OFFICE VISIT (OUTPATIENT)
Dept: FAMILY MEDICINE CLINIC | Facility: HOSPITAL | Age: 77
End: 2022-09-02
Payer: MEDICARE

## 2022-09-02 VITALS
HEIGHT: 69 IN | OXYGEN SATURATION: 98 % | BODY MASS INDEX: 29.51 KG/M2 | WEIGHT: 199.2 LBS | HEART RATE: 57 BPM | TEMPERATURE: 97.3 F | SYSTOLIC BLOOD PRESSURE: 132 MMHG | DIASTOLIC BLOOD PRESSURE: 80 MMHG

## 2022-09-02 DIAGNOSIS — I25.10 CORONARY ARTERY DISEASE INVOLVING NATIVE HEART WITHOUT ANGINA PECTORIS, UNSPECIFIED VESSEL OR LESION TYPE: Primary | ICD-10-CM

## 2022-09-02 DIAGNOSIS — E78.2 MIXED HYPERLIPIDEMIA: ICD-10-CM

## 2022-09-02 DIAGNOSIS — I50.9 CONGESTIVE HEART FAILURE, UNSPECIFIED HF CHRONICITY, UNSPECIFIED HEART FAILURE TYPE (HCC): ICD-10-CM

## 2022-09-02 DIAGNOSIS — Z12.5 SCREENING PSA (PROSTATE SPECIFIC ANTIGEN): ICD-10-CM

## 2022-09-02 DIAGNOSIS — Z95.2 S/P TAVR (TRANSCATHETER AORTIC VALVE REPLACEMENT): ICD-10-CM

## 2022-09-02 DIAGNOSIS — R73.03 PREDIABETES: ICD-10-CM

## 2022-09-02 PROBLEM — Z01.812 ENCOUNTER FOR PRE-OPERATIVE LABORATORY TESTING: Status: RESOLVED | Noted: 2022-02-15 | Resolved: 2022-09-02

## 2022-09-02 PROCEDURE — 99214 OFFICE O/P EST MOD 30 MIN: CPT | Performed by: FAMILY MEDICINE

## 2022-09-02 RX ORDER — LATANOPROST 50 UG/ML
SOLUTION/ DROPS OPHTHALMIC
COMMUNITY
Start: 2022-08-07

## 2022-09-02 NOTE — PROGRESS NOTES
Assessment/Plan:      Problem List Items Addressed This Visit        Cardiovascular and Mediastinum    CAD (coronary artery disease) - Primary    Relevant Orders    CBC    Comprehensive metabolic panel    Lipid Panel with Direct LDL reflex    TSH, 3rd generation with Free T4 reflex    CHF (congestive heart failure) (HCC)       Other    Hyperlipidemia    Relevant Orders    TSH, 3rd generation with Free T4 reflex    Prediabetes    Relevant Orders    Lipid Panel with Direct LDL reflex    TSH, 3rd generation with Free T4 reflex    S/P TAVR (transcatheter aortic valve replacement)      Other Visit Diagnoses     Screening PSA (prostate specific antigen)        Relevant Orders    PSA, Total Screen           Plan/Discussion:  Overall Glenetta Fleischer is doing well  Continues fu with Cardiology  Update labs end of October/early November  Continue efforts with dietary control and exercise  No medication changes  Subjective:   Chief Complaint   Patient presents with    Follow-up        Patient ID: Brie Zeng is a 77 y o  male  Here for fu of chronic conditions  No new cocnerns   Feeling well  No issues with medications  The following portions of the patient's history were reviewed and updated as appropriate: allergies, current medications, past family history, past medical history, past social history, past surgical history and problem list     Review of Systems   Constitutional: Negative  Negative for activity change, appetite change, chills and diaphoresis  HENT: Negative for congestion and dental problem  Respiratory: Negative  Negative for apnea, chest tightness, shortness of breath and wheezing  Cardiovascular: Negative  Negative for chest pain, palpitations and leg swelling  Gastrointestinal: Negative  Negative for abdominal distention, abdominal pain, constipation, diarrhea and nausea  Genitourinary: Negative    Negative for difficulty urinating, dysuria and frequency  Objective:  Vitals:    09/02/22 0738   BP: 132/80   Patient Position: Sitting   Cuff Size: Large   Pulse: 57   Temp: (!) 97 3 °F (36 3 °C)   TempSrc: Tympanic   SpO2: 98%   Weight: 90 4 kg (199 lb 3 2 oz)   Height: 5' 9" (1 753 m)     BP Readings from Last 6 Encounters:   09/02/22 132/80   06/30/22 170/77   04/25/22 128/60   04/01/22 142/76   04/01/22 144/64   03/08/22 142/76      Wt Readings from Last 6 Encounters:   09/02/22 90 4 kg (199 lb 3 2 oz)   06/30/22 89 8 kg (198 lb)   04/25/22 91 7 kg (202 lb 3 2 oz)   04/01/22 92 5 kg (204 lb)   04/01/22 93 8 kg (206 lb 11 2 oz)   03/08/22 92 6 kg (204 lb 3 2 oz)             Physical Exam  Vitals and nursing note reviewed  Constitutional:       General: He is not in acute distress  Appearance: Normal appearance  He is well-developed and normal weight  He is not ill-appearing  HENT:      Head: Normocephalic and atraumatic  Right Ear: Tympanic membrane, ear canal and external ear normal       Left Ear: Tympanic membrane, ear canal and external ear normal       Nose: Nose normal  No congestion or rhinorrhea  Mouth/Throat:      Mouth: Mucous membranes are moist       Pharynx: No oropharyngeal exudate or posterior oropharyngeal erythema  Eyes:      Extraocular Movements: Extraocular movements intact  Conjunctiva/sclera: Conjunctivae normal       Pupils: Pupils are equal, round, and reactive to light  Cardiovascular:      Rate and Rhythm: Normal rate and regular rhythm  Heart sounds: Normal heart sounds  No murmur heard  No friction rub  No gallop  Pulmonary:      Effort: Pulmonary effort is normal  No respiratory distress  Breath sounds: Normal breath sounds  No wheezing or rales  Chest:      Chest wall: No tenderness  Abdominal:      General: Bowel sounds are normal  There is no distension  Palpations: Abdomen is soft  There is no mass  Tenderness: There is no abdominal tenderness   There is no guarding or rebound  Musculoskeletal:         General: Normal range of motion  Cervical back: Normal range of motion and neck supple  Skin:     General: Skin is warm  Capillary Refill: Capillary refill takes less than 2 seconds  Neurological:      Mental Status: He is alert and oriented to person, place, and time     Psychiatric:         Mood and Affect: Mood normal          Behavior: Behavior normal

## 2022-10-28 ENCOUNTER — OFFICE VISIT (OUTPATIENT)
Dept: CARDIOLOGY CLINIC | Facility: CLINIC | Age: 77
End: 2022-10-28
Payer: MEDICARE

## 2022-10-28 VITALS
SYSTOLIC BLOOD PRESSURE: 126 MMHG | HEART RATE: 61 BPM | HEIGHT: 69 IN | OXYGEN SATURATION: 97 % | WEIGHT: 207.2 LBS | DIASTOLIC BLOOD PRESSURE: 78 MMHG | BODY MASS INDEX: 30.69 KG/M2

## 2022-10-28 DIAGNOSIS — Z95.2 S/P TAVR (TRANSCATHETER AORTIC VALVE REPLACEMENT): Primary | ICD-10-CM

## 2022-10-28 DIAGNOSIS — E78.2 MIXED HYPERLIPIDEMIA: ICD-10-CM

## 2022-10-28 PROCEDURE — 99214 OFFICE O/P EST MOD 30 MIN: CPT | Performed by: INTERNAL MEDICINE

## 2022-10-28 NOTE — PROGRESS NOTES
Cardiology Follow Up    Germaine Cantu  1945  [id number]  [facility] Neelam Terrell  [address] [facility] [address]  [phone]  [phone]    1  S/P TAVR (transcatheter aortic valve replacement)     2  Mixed hyperlipidemia         Interval History: Followup TAVR    Doing well  No chest pain, dyspnea or palpitations  Medical Problems             Problem List     Cataract    Hyperlipidemia    Umbilical hernia without obstruction and without gangrene    Prediabetes    Severe aortic stenosis    Rectus diastasis    Obesity (BMI 30 0-34  9)    Laceration of right palm    S/P TAVR (transcatheter aortic valve replacement)    Hyperchloremia    CAD (coronary artery disease)    CHF (congestive heart failure) ([facility])    Wt Readings from Last 3 Encounters:   10/28/22 94 kg (207 lb 3 2 oz)   09/02/22 90 4 kg (199 lb 3 2 oz)   06/30/22 89 8 kg (198 lb)                           Past Medical History:   Diagnosis Date   • Aneurysm of abdominal vessel ([facility])     hypogastric artery, 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   • CAD (coronary artery disease)    • Cataract    • CHF (congestive heart failure) ([facility] [address] )    • Encounter for pre-operative laboratory testing 2/15/2022   • Glaucoma    • Hyperlipidemia    • Moderate aortic insufficiency    • Prediabetes    • Severe aortic stenosis    • Sinus bradycardia    • Umbilical hernia      Social History     Socioeconomic History   • Marital status: /Civil Union     Spouse name: Not on file   • Number of children: Not on file   • Years of education: Not on file   • Highest education level: Not on file   Occupational History   • Not on file   Tobacco Use   • Smoking status: Never Smoker   • Smokeless tobacco: Never Used   Vaping Use   • Vaping Use: Never used   Substance and Sexual Activity   • Alcohol use:  Yes     Alcohol/week: 2 0 standard drinks     Types: 2 Cans of beer per week     Comment: twice a wk    • Drug use: No   • Sexual activity: Not on file   Other Topics Concern   • Not on file   Social History Narrative   • Not on file     Social Determinants of Health     Financial Resource Strain: Not on file   Food Insecurity: Not on file   Transportation Needs: Not on file   Physical Activity: Not on file   Stress: Not on file   Social Connections: Not on file   Intimate Partner Violence: Not on file   Housing Stability: Not on file      Family History   Problem Relation Age of Onset   • Colon cancer Mother    • Other Father         heart problem   • Parkinsonism Family      Past Surgical History:   Procedure Laterality Date   • ARTHROSCOPY KNEE Right    • BUNIONECTOMY Right    • CARDIAC CATHETERIZATION Bilateral 2/14/2022    Procedure: Cardiac catheterization;  Surgeon: Davey Carney MD;  [facility]; Service: Cardiology   • CARDIAC CATHETERIZATION N/A 2/22/2022    Procedure: CARDIAC TAVR;  Surgeon: Terrell Gardiner MD;  [facility];  Service: Cardiology   • HEMORRHOID SURGERY     • ID ECHO TRANSESOPHAG R-T 2D W/PRB IMG ACQUISJ I&R N/A 2/22/2022    Procedure: TRANSESOPHAGEAL ECHOCARDIOGRAM (ELISSA); Surgeon: Chery Moreno MD;  [facility];  Service: Cardiac Surgery   • ID REPLACE AORTIC VALVE OPENFEMORAL ARTERY APPROACH N/A 2/22/2022    Procedure: REPLACEMENT AORTIC VALVE TRANSCATHETER (TAVR) TRANSFEMORAL W/ 29MM SARKAR PAULO S3 ULTRA VALVE(ACCESS ON LEFT);   Surgeon: Chery Moreno MD;  [facility];  Service: Cardiac Surgery   • SHOULDER SURGERY  2012   • TONSILLECTOMY      childhood       Current Outpatient Medications:   •  acetaminophen (TYLENOL) 325 mg tablet, Take 2 tablets (650 mg total) by mouth every 6 (six) hours as needed for fever (temperature greater than 101 F), Disp: , Rfl: 0  •  aspirin 81 mg chewable tablet, Chew 1 tablet (81 mg total) daily, Disp: 30 tablet, Rfl: 2  •  latanoprost (XALATAN) 0 005 % ophthalmic solution, INSTILL 1 DROP INTO EACH EYE ONCE DAILY AT NIGHT, Disp: , Rfl:   •  metoprolol tartrate (LOPRESSOR) 25 mg tablet, Take 0 5 tablets (12 5 mg total) by mouth every 12 (twelve) hours, Disp: 90 tablet, Rfl: 3  •  simvastatin (ZOCOR) 10 mg tablet, Take 1 tablet by mouth once daily, Disp: 90 tablet, Rfl: 0  •  Lumigan 0 01 % ophthalmic drops, , Disp: , Rfl:   No Known Allergies    Labs:     Chemistry        Component Value Date/Time     11/20/2014 0905    K 4 1 03/30/2022 0712    K 4 4 11/20/2014 0905     (H) 03/30/2022 0712     11/20/2014 0905    CO2 27 03/30/2022 0712    CO2 24 02/22/2022 1039    BUN 28 (H) 03/30/2022 0712    BUN 17 11/20/2014 0905    CREATININE 1 11 03/30/2022 0712    CREATININE 0 77 11/20/2014 0905        Component Value Date/Time    CALCIUM 9 2 03/30/2022 0712    CALCIUM 8 8 11/20/2014 0905    ALKPHOS 51 02/02/2022 1131    ALKPHOS 71 11/20/2014 0905    AST 25 02/02/2022 1131    AST 27 11/20/2014 0905    ALT 44 02/02/2022 1131    ALT 46 11/20/2014 0905    BILITOT 0 5 11/20/2014 0905            Lab Results   Component Value Date    CHOL 246 11/20/2014     Lab Results   Component Value Date    HDL 65 10/27/2021    HDL 55 05/22/2020    HDL 61 (H) 04/05/2019     Lab Results   Component Value Date    LDLCALC 100 10/27/2021    LDLCALC 93 05/22/2020    LDLCALC 109 (H) 04/05/2019     Lab Results   Component Value Date    TRIG 102 10/27/2021    TRIG 61 05/22/2020    TRIG 93 04/05/2019     No results found for: CHOLHDL    Imaging: No results found  Review of Systems   Constitutional: Negative  HENT: Negative  Eyes: Negative  Cardiovascular: Negative  Respiratory: Negative  Endocrine: Negative  Hematologic/Lymphatic: Negative  Skin: Negative  Musculoskeletal: Negative  Gastrointestinal: Negative  Genitourinary: Negative  Neurological: Negative  Psychiatric/Behavioral: Negative  Allergic/Immunologic: Negative          Vitals:    10/28/22 0808   BP: 126/78   Pulse: 61   SpO2: 97% Physical Exam  Vitals reviewed  Constitutional:       Appearance: Normal appearance  HENT:      Head: Normocephalic  Nose: Nose normal       Mouth/Throat:      Mouth: Mucous membranes are moist       Pharynx: Oropharynx is clear  Eyes:      General: No scleral icterus  Conjunctiva/sclera: Conjunctivae normal    Cardiovascular:      Rate and Rhythm: Normal rate and regular rhythm  Heart sounds: No murmur heard  No friction rub  No gallop  Pulmonary:      Effort: Pulmonary effort is normal  No respiratory distress  Breath sounds: Normal breath sounds  No wheezing or rales  Abdominal:      General: Abdomen is flat  Bowel sounds are normal  There is no distension  Palpations: Abdomen is soft  Tenderness: There is no abdominal tenderness  There is no guarding  Musculoskeletal:      Cervical back: Normal range of motion and neck supple  Right lower leg: No edema  Left lower leg: No edema  Skin:     General: Skin is warm and dry  Neurological:      General: No focal deficit present  Mental Status: He is alert and oriented to person, place, and time  Psychiatric:         Mood and Affect: Mood normal          Behavior: Behavior normal          Discussion/Summary:    Severe Aortic Stenosis SP  TAVR: Doing very well          Continue with aspirin, metoprolol and simvastatin  His last LDL was 100  The patient was counseled regarding diagnostic results, instructions for management, risk factor reductions, impressions  total time of encounter was 25 minutes and 15 minutes was spent counseling

## 2022-10-31 ENCOUNTER — APPOINTMENT (OUTPATIENT)
Dept: LAB | Facility: HOSPITAL | Age: 77
End: 2022-10-31

## 2022-10-31 DIAGNOSIS — I25.10 CORONARY ARTERY DISEASE INVOLVING NATIVE HEART WITHOUT ANGINA PECTORIS, UNSPECIFIED VESSEL OR LESION TYPE: ICD-10-CM

## 2022-10-31 DIAGNOSIS — E78.2 MIXED HYPERLIPIDEMIA: ICD-10-CM

## 2022-10-31 DIAGNOSIS — R73.03 PREDIABETES: ICD-10-CM

## 2022-10-31 DIAGNOSIS — Z12.5 SCREENING PSA (PROSTATE SPECIFIC ANTIGEN): ICD-10-CM

## 2022-10-31 LAB
ALBUMIN SERPL BCP-MCNC: 3.3 G/DL (ref 3.5–5)
ALP SERPL-CCNC: 61 U/L (ref 46–116)
ALT SERPL W P-5'-P-CCNC: 37 U/L (ref 12–78)
ANION GAP SERPL CALCULATED.3IONS-SCNC: 2 MMOL/L (ref 4–13)
AST SERPL W P-5'-P-CCNC: 22 U/L (ref 5–45)
BILIRUB SERPL-MCNC: 0.49 MG/DL (ref 0.2–1)
BUN SERPL-MCNC: 26 MG/DL (ref 5–25)
CALCIUM ALBUM COR SERPL-MCNC: 9.9 MG/DL (ref 8.3–10.1)
CALCIUM SERPL-MCNC: 9.3 MG/DL (ref 8.3–10.1)
CHLORIDE SERPL-SCNC: 111 MMOL/L (ref 96–108)
CHOLEST SERPL-MCNC: 181 MG/DL
CO2 SERPL-SCNC: 27 MMOL/L (ref 21–32)
CREAT SERPL-MCNC: 1.1 MG/DL (ref 0.6–1.3)
ERYTHROCYTE [DISTWIDTH] IN BLOOD BY AUTOMATED COUNT: 11.9 % (ref 11.6–15.1)
GFR SERPL CREATININE-BSD FRML MDRD: 65 ML/MIN/1.73SQ M
GLUCOSE P FAST SERPL-MCNC: 115 MG/DL (ref 65–99)
HCT VFR BLD AUTO: 45.6 % (ref 36.5–49.3)
HDLC SERPL-MCNC: 58 MG/DL
HGB BLD-MCNC: 14 G/DL (ref 12–17)
LDLC SERPL CALC-MCNC: 105 MG/DL (ref 0–100)
MCH RBC QN AUTO: 32.6 PG (ref 26.8–34.3)
MCHC RBC AUTO-ENTMCNC: 30.7 G/DL (ref 31.4–37.4)
MCV RBC AUTO: 106 FL (ref 82–98)
PLATELET # BLD AUTO: 232 THOUSANDS/UL (ref 149–390)
PMV BLD AUTO: 10.8 FL (ref 8.9–12.7)
POTASSIUM SERPL-SCNC: 4.1 MMOL/L (ref 3.5–5.3)
PROT SERPL-MCNC: 7.3 G/DL (ref 6.4–8.4)
PSA SERPL-MCNC: 1 NG/ML (ref 0–4)
RBC # BLD AUTO: 4.29 MILLION/UL (ref 3.88–5.62)
SODIUM SERPL-SCNC: 140 MMOL/L (ref 135–147)
TRIGL SERPL-MCNC: 89 MG/DL
TSH SERPL DL<=0.05 MIU/L-ACNC: 1.08 UIU/ML (ref 0.45–4.5)
WBC # BLD AUTO: 6.27 THOUSAND/UL (ref 4.31–10.16)

## 2022-11-10 DIAGNOSIS — E78.5 HYPERLIPIDEMIA, UNSPECIFIED HYPERLIPIDEMIA TYPE: ICD-10-CM

## 2022-11-10 RX ORDER — SIMVASTATIN 10 MG
TABLET ORAL
Qty: 90 TABLET | Refills: 0 | Status: SHIPPED | OUTPATIENT
Start: 2022-11-10

## 2023-01-09 ENCOUNTER — OFFICE VISIT (OUTPATIENT)
Dept: FAMILY MEDICINE CLINIC | Facility: HOSPITAL | Age: 78
End: 2023-01-09

## 2023-01-09 VITALS
SYSTOLIC BLOOD PRESSURE: 130 MMHG | BODY MASS INDEX: 30.87 KG/M2 | WEIGHT: 208.4 LBS | TEMPERATURE: 97.1 F | HEIGHT: 69 IN | HEART RATE: 62 BPM | DIASTOLIC BLOOD PRESSURE: 82 MMHG

## 2023-01-09 DIAGNOSIS — I10 BENIGN ESSENTIAL HTN: ICD-10-CM

## 2023-01-09 DIAGNOSIS — I50.9 CONGESTIVE HEART FAILURE, UNSPECIFIED HF CHRONICITY, UNSPECIFIED HEART FAILURE TYPE (HCC): ICD-10-CM

## 2023-01-09 DIAGNOSIS — I25.10 CORONARY ARTERY DISEASE INVOLVING NATIVE HEART WITHOUT ANGINA PECTORIS, UNSPECIFIED VESSEL OR LESION TYPE: ICD-10-CM

## 2023-01-09 DIAGNOSIS — Z00.00 MEDICARE ANNUAL WELLNESS VISIT, SUBSEQUENT: Primary | ICD-10-CM

## 2023-01-09 PROBLEM — E87.8 HYPERCHLOREMIA: Status: RESOLVED | Noted: 2022-02-22 | Resolved: 2023-01-09

## 2023-01-09 RX ORDER — LOSARTAN POTASSIUM 25 MG/1
25 TABLET ORAL DAILY
Qty: 30 TABLET | Refills: 1 | Status: SHIPPED | OUTPATIENT
Start: 2023-01-09

## 2023-01-09 NOTE — PROGRESS NOTES
Assessment and Plan:     Problem List Items Addressed This Visit        Cardiovascular and Mediastinum    CAD (coronary artery disease)    CHF (congestive heart failure) ([address] )   Other Visit Diagnoses     Medicare annual wellness visit, subsequent    -  Primary    Benign essential HTN        Relevant Medications    losartan (COZAAR) 25 mg tablet          Htn  Not optimal ( and higher readings at home)  /   Continue with metoprolol  Add losartan 25 po daily  Monitor bp  Monitor salt intake  Work on weight loss  Fu in 3 months  Cad s table  Chf  Due to AS  Now s/p tavr  Asytmpomatic  Preventive health issues were discussed with patient, and age appropriate screening tests were ordered as noted in patient's After Visit Summary  Personalized health advice and appropriate referrals for health education or preventive services given if needed, as noted in patient's After Visit Summary  History of Present Illness:     Patient presents for a Medicare Wellness Visit    Here for fu of htn  Has had elevated Bp readings at home  Mostly in the 150s  Occasional few seconds of a lightheadedness sensation  No cp, no palpitations, no headaches, no dizziness  Taking metoprolol regularly  Patient Care Team:  Colton Ochoa MD as PCP - General     Review of Systems:     Review of Systems   Constitutional: Negative  Negative for activity change, appetite change, chills and diaphoresis  HENT: Negative for congestion and dental problem  Respiratory: Negative  Negative for apnea, chest tightness, shortness of breath and wheezing  Cardiovascular: Negative  Negative for chest pain, palpitations and leg swelling  Gastrointestinal: Negative  Negative for abdominal distention, abdominal pain, constipation, diarrhea and nausea  Genitourinary: Negative  Negative for difficulty urinating, dysuria and frequency          Problem List:     Patient Active Problem List   Diagnosis   • Cataract   • Hyperlipidemia   • Umbilical hernia without obstruction and without gangrene   • Prediabetes   • Severe aortic stenosis   • Rectus diastasis   • Obesity (BMI 30 0-34  9)   • Laceration of right palm   • S/P TAVR (transcatheter aortic valve replacement)   • CAD (coronary artery disease)   • CHF (congestive heart failure) (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)    • Encounter for pre-operative laboratory testing 2/15/2022   • Glaucoma    • Hyperchloremia 2/22/2022   • Hyperlipidemia    • Moderate aortic insufficiency    • Prediabetes    • Severe aortic stenosis    • Sinus bradycardia    • Umbilical hernia      Past Surgical History:   Procedure Laterality Date   • ARTHROSCOPY KNEE Right    • BUNIONECTOMY Right    • CARDIAC CATHETERIZATION Bilateral 2/14/2022    Procedure: Cardiac catheterization;  Surgeon: Martir Foster MD;  [facility]; Service: Cardiology   • CARDIAC CATHETERIZATION N/A 2/22/2022    Procedure: CARDIAC TAVR;  Surgeon: Winston Austin MD;  Location: [facility];  Service: Cardiology   • HEMORRHOID SURGERY     • WV ECHO TRANSESOPHAG R-T 2D W/PRB IMG ACQUISJ I&R N/A 2/22/2022    Procedure: TRANSESOPHAGEAL ECHOCARDIOGRAM (ELISSA); Surgeon: Rohit James MD;  Location[facility];  Service: Cardiac Surgery   • WV REPLACE AORTIC VALVE OPENFEMORAL ARTERY APPROACH N/A 2/22/2022    Procedure: REPLACEMENT AORTIC VALVE TRANSCATHETER (TAVR) TRANSFEMORAL W/ 29MM SARKAR PAULO S3 ULTRA VALVE(ACCESS ON LEFT);   Surgeon: Rohit James MD;  Location[facility];  Service: Cardiac Surgery   • SHOULDER SURGERY  2012   • TONSILLECTOMY      childhood      Family History:     Family History   Problem Relation Age of Onset   • Colon cancer Mother    • Other Father         heart problem   • Parkinsonism Family       Social History:     Social History     Socioeconomic History   • Marital status: /Civil Union     Spouse name: None   • Number of children: None   • Years of education: None   • Highest education level: None   Occupational History   • None   Tobacco Use   • Smoking status: Never   • Smokeless tobacco: Never   Vaping Use   • Vaping Use: Never used   Substance and Sexual Activity   • Alcohol use: Yes     Alcohol/week: 2 0 standard drinks     Types: 2 Cans of beer per week     Comment: twice a wk    • Drug use: No   • Sexual activity: None   Other Topics Concern   • None   Social History Narrative   • None     Social Determinants of Health     Financial Resource Strain: Low Risk    • Difficulty of Paying Living Expenses: Not hard at all   Food Insecurity: Not on file   Transportation Needs: No Transportation Needs   • Lack of Transportation (Medical): No   • Lack of Transportation (Non-Medical): No   Physical Activity: Not on file   Stress: Not on file   Social Connections: Not on file   Intimate Partner Violence: Not on file   Housing Stability: Not on file      Medications and Allergies:     Current Outpatient Medications   Medication Sig Dispense Refill   • aspirin 81 mg chewable tablet Chew 1 tablet (81 mg total) daily 30 tablet 2   • latanoprost (XALATAN) 0 005 % ophthalmic solution INSTILL 1 DROP INTO EACH EYE ONCE DAILY AT NIGHT     • losartan (COZAAR) 25 mg tablet Take 1 tablet (25 mg total) by mouth daily 30 tablet 1   • metoprolol tartrate (LOPRESSOR) 25 mg tablet Take 0 5 tablets (12 5 mg total) by mouth every 12 (twelve) hours 90 tablet 3   • simvastatin (ZOCOR) 10 mg tablet Take 1 tablet by mouth once daily 90 tablet 0   • Lumigan 0 01 % ophthalmic drops  (Patient not taking: Reported on 10/28/2022)       No current facility-administered medications for this visit       No Known Allergies   Immunizations:     Immunization History   Administered Date(s) Administered   • COVID-19 PFIZER VACCINE 0 3 ML IM 03/09/2021, 03/30/2021, 11/08/2021   • COVID-19 Pfizer Vac BIVALENT Bassam-sucrose 12 Yr+ IM (BOOSTER ONLY) 10/26/2022   • INFLUENZA 10/11/2021, 12/05/2022   • Influenza Split High Dose Preservative Free IM 10/22/2014   • Influenza, high dose seasonal 0 7 mL 10/14/2020   • Influenza, seasonal, injectable 09/23/2017   • Pneumococcal Conjugate 13-Valent 03/05/2018   • Pneumococcal Polysaccharide PPV23 11/06/2012, 04/05/2019   • Tdap 11/06/2008, 06/30/2022   • Zoster Vaccine Recombinant 04/05/2019, 08/06/2019      Health Maintenance:         Topic Date Due   • Hepatitis C Screening  Completed   • Colorectal Cancer Screening  Discontinued         Topic Date Due   • Hepatitis B Vaccine (1 of 3 - 3-dose series) Never done   • COVID-19 Vaccine (4 - Booster for Pfizer series) 01/03/2022      Medicare Screening Tests and Risk Assessments:     Mariya Sotelo is here for his Subsequent Wellness visit  Health Risk Assessment:   Patient rates overall health as very good  Patient feels that their physical health rating is slightly better  Patient is very satisfied with their life  Eyesight was rated as same  Hearing was rated as same  Patient feels that their emotional and mental health rating is same  Patients states they are never, rarely angry  Patient states they are never, rarely unusually tired/fatigued  Pain experienced in the last 7 days has been none  Patient states that he has experienced no weight loss or gain in last 6 months  Depression Screening:   PHQ-2 Score: 0      Fall Risk Screening: In the past year, patient has experienced: no history of falling in past year      Home Safety:  Patient does not have trouble with stairs inside or outside of their home  Patient has working smoke alarms and has no working carbon monoxide detector  Home safety hazards include: none  Nutrition:   Current diet is Regular       Medications:   Patient is currently taking over-the-counter supplements  OTC medications include: see medication list  Patient is able to manage medications  Activities of Daily Living (ADLs)/Instrumental Activities of Daily Living (IADLs):   Walk and transfer into and out of bed and chair?: Yes  Dress and groom yourself?: Yes    Bathe or shower yourself?: Yes    Feed yourself? Yes  Do your laundry/housekeeping?: Yes  Manage your money, pay your bills and track your expenses?: Yes  Make your own meals?: Yes    Do your own shopping?: Yes    Previous Hospitalizations:   Any hospitalizations or ED visits within the last 12 months?: No      Advance Care Planning:   Living will: Yes    Durable POA for healthcare: Yes    Advanced directive: Yes    Advanced directive counseling given: Yes      Cognitive Screening:   Provider or family/friend/caregiver concerned regarding cognition?: No    PREVENTIVE SCREENINGS      Cardiovascular Screening:    General: Screening Not Indicated and History Lipid Disorder      Diabetes Screening:     General: Screening Current      Colorectal Cancer Screening:     General: Risks and Benefits Discussed      Prostate Cancer Screening:    General: Screening Not Indicated      Osteoporosis Screening:    General: Screening Not Indicated and Patient Declines      Abdominal Aortic Aneurysm (AAA) Screening:        General: Screening Not Indicated      Lung Cancer Screening:     General: Screening Not Indicated      Hepatitis C Screening:    General: Screening Current    Screening, Brief Intervention, and Referral to Treatment (SBIRT)    Screening  Typical number of drinks in a day: 0  Typical number of drinks in a week: 7  Interpretation: Low risk drinking behavior  AUDIT-C Screenin) How often did you have a drink containing alcohol in the past year? 2 to 3 times a week  2) How many drinks did you have on a typical day when you were drinking in the past year?  3 to 4  3) How often did you have 6 or more drinks on one occasion in the past year? never    AUDIT-C Score: 3  Interpretation: Score 0-3 (male): Negative screen for alcohol misuse    Single Item Drug Screening:  How often have you used an illegal drug (including marijuana) or a prescription medication for non-medical reasons in the past year? never    Single Item Drug Screen Score: 0  Interpretation: Negative screen for possible drug use disorder    Vision Screening    Right eye Left eye Both eyes   Without correction      With correction 20/40 20/30 20/25        Physical Exam:     /82   Pulse 62   Temp (!) 97 1 °F (36 2 °C)   Ht 5' 9" (1 753 m)   Wt 94 5 kg (208 lb 6 4 oz)   BMI 30 78 kg/m²     Physical Exam  Vitals and nursing note reviewed  Constitutional:       General: He is not in acute distress  Appearance: Normal appearance  He is well-developed  He is obese  HENT:      Head: Normocephalic and atraumatic  Nose: Nose normal       Mouth/Throat:      Mouth: Mucous membranes are moist    Eyes:      Conjunctiva/sclera: Conjunctivae normal    Cardiovascular:      Rate and Rhythm: Normal rate and regular rhythm  Heart sounds: No murmur heard  Pulmonary:      Effort: Pulmonary effort is normal  No respiratory distress  Breath sounds: Normal breath sounds  Abdominal:      Palpations: Abdomen is soft  Tenderness: There is no abdominal tenderness  Musculoskeletal:         General: No swelling  Cervical back: Neck supple  Skin:     General: Skin is warm and dry  Capillary Refill: Capillary refill takes less than 2 seconds  Neurological:      Mental Status: He is alert  Psychiatric:         Mood and Affect: Mood normal          Behavior: Behavior normal          Thought Content:  Thought content normal          Judgment: Judgment normal           Marcin Reyes MD

## 2023-01-09 NOTE — PATIENT INSTRUCTIONS
Medicare Preventive Visit Patient Instructions  Thank you for completing your Welcome to Medicare Visit or Medicare Annual Wellness Visit today  Your next wellness visit will be due in one year (1/10/2024)  The screening/preventive services that you may require over the next 5-10 years are detailed below  Some tests may not apply to you based off risk factors and/or age  Screening tests ordered at today's visit but not completed yet may show as past due  Also, please note that scanned in results may not display below  Preventive Screenings:  Service Recommendations Previous Testing/Comments   Colorectal Cancer Screening  · Colonoscopy    · Fecal Occult Blood Test (FOBT)/Fecal Immunochemical Test (FIT)  · Fecal DNA/Cologuard Test  · Flexible Sigmoidoscopy Age: 39-70 years old   Colonoscopy: every 10 years (May be performed more frequently if at higher risk)  OR  FOBT/FIT: every 1 year  OR  Cologuard: every 3 years  OR  Sigmoidoscopy: every 5 years  Screening may be recommended earlier than age 39 if at higher risk for colorectal cancer  Also, an individualized decision between you and your healthcare provider will decide whether screening between the ages of 74-80 would be appropriate   Colonoscopy: 06/17/2021  FOBT/FIT: Not on file  Cologuard: Not on file  Sigmoidoscopy: Not on file          Prostate Cancer Screening Individualized decision between patient and health care provider in men between ages of 53-78   Medicare will cover every 12 months beginning on the day after your 50th birthday PSA: 1 0 ng/mL     Screening Not Indicated     Hepatitis C Screening Once for adults born between 1945 and 1965  More frequently in patients at high risk for Hepatitis C Hep C Antibody: 10/27/2021    Screening Current   Diabetes Screening 1-2 times per year if you're at risk for diabetes or have pre-diabetes Fasting glucose: 115 mg/dL (10/31/2022)  A1C: 5 4 % (5/22/2020)  Screening Current   Cholesterol Screening Once every 5 years if you don't have a lipid disorder  May order more often based on risk factors  Lipid panel: 10/31/2022  Screening Not Indicated  History Lipid Disorder      Other Preventive Screenings Covered by Medicare:  1  Abdominal Aortic Aneurysm (AAA) Screening: covered once if your at risk  You're considered to be at risk if you have a family history of AAA or a male between the age of 73-68 who smoking at least 100 cigarettes in your lifetime  2  Lung Cancer Screening: covers low dose CT scan once per year if you meet all of the following conditions: (1) Age 50-69; (2) No signs or symptoms of lung cancer; (3) Current smoker or have quit smoking within the last 15 years; (4) You have a tobacco smoking history of at least 20 pack years (packs per day x number of years you smoked); (5) You get a written order from a healthcare provider  3  Glaucoma Screening: covered annually if you're considered high risk: (1) You have diabetes OR (2) Family history of glaucoma OR (3)  aged 48 and older OR (3)  American aged 72 and older  3  Osteoporosis Screening: covered every 2 years if you meet one of the following conditions: (1) Have a vertebral abnormality; (2) On glucocorticoid therapy for more than 3 months; (3) Have primary hyperparathyroidism; (4) On osteoporosis medications and need to assess response to drug therapy  5  HIV Screening: covered annually if you're between the age of 12-76  Also covered annually if you are younger than 13 and older than 72 with risk factors for HIV infection  For pregnant patients, it is covered up to 3 times per pregnancy      Immunizations:  Immunization Recommendations   Influenza Vaccine Annual influenza vaccination during flu season is recommended for all persons aged >= 6 months who do not have contraindications   Pneumococcal Vaccine   * Pneumococcal conjugate vaccine = PCV13 (Prevnar 13), PCV15 (Vaxneuvance), PCV20 (Prevnar 20)  * Pneumococcal polysaccharide vaccine = PPSV23 (Pneumovax) Adults 2364 years old: 1-3 doses may be recommended based on certain risk factors  Adults 72 years old: 1-2 doses may be recommended based off what pneumonia vaccine you previously received   Hepatitis B Vaccine 3 dose series if at intermediate or high risk (ex: diabetes, end stage renal disease, liver disease)   Tetanus (Td) Vaccine - COST NOT COVERED BY MEDICARE PART B Following completion of primary series, a booster dose should be given every 10 years to maintain immunity against tetanus  Td may also be given as tetanus wound prophylaxis  Tdap Vaccine - COST NOT COVERED BY MEDICARE PART B Recommended at least once for all adults  For pregnant patients, recommended with each pregnancy  Shingles Vaccine (Shingrix) - COST NOT COVERED BY MEDICARE PART B  2 shot series recommended in those aged 48 and above     Health Maintenance Due:      Topic Date Due   • Hepatitis C Screening  Completed   • Colorectal Cancer Screening  Discontinued     Immunizations Due:      Topic Date Due   • Hepatitis B Vaccine (1 of 3 - 3-dose series) Never done   • COVID-19 Vaccine (4 - Booster for Mathew Peter series) 01/03/2022     Advance Directives   What are advance directives? Advance directives are legal documents that state your wishes and plans for medical care  These plans are made ahead of time in case you lose your ability to make decisions for yourself  Advance directives can apply to any medical decision, such as the treatments you want, and if you want to donate organs  What are the types of advance directives? There are many types of advance directives, and each state has rules about how to use them  You may choose a combination of any of the following:  · Living will: This is a written record of the treatment you want  You can also choose which treatments you do not want, which to limit, and which to stop at a certain time  This includes surgery, medicine, IV fluid, and tube feedings  · Durable power of  for healthcare [facility]): This is a written record that states who you want to make healthcare choices for you when you are unable to make them for yourself  This person, called a proxy, is usually a family member or a friend  You may choose more than 1 proxy  · Do not resuscitate (DNR) order:  A DNR order is used in case your heart stops beating or you stop breathing  It is a request not to have certain forms of treatment, such as CPR  A DNR order may be included in other types of advance directives  · Medical directive: This covers the care that you want if you are in a coma, near death, or unable to make decisions for yourself  You can list the treatments you want for each condition  Treatment may include pain medicine, surgery, blood transfusions, dialysis, IV or tube feedings, and a ventilator (breathing machine)  · Values history: This document has questions about your views, beliefs, and how you feel and think about life  This information can help others choose the care that you would choose  Why are advance directives important? An advance directive helps you control your care  Although spoken wishes may be used, it is better to have your wishes written down  Spoken wishes can be misunderstood, or not followed  Treatments may be given even if you do not want them  An advance directive may make it easier for your family to make difficult choices about your care  Weight Management   Why it is important to manage your weight:  Being overweight increases your risk of health conditions such as heart disease, high blood pressure, type 2 diabetes, and certain types of cancer  It can also increase your risk for osteoarthritis, sleep apnea, and other respiratory problems  Aim for a slow, steady weight loss  Even a small amount of weight loss can lower your risk of health problems    How to lose weight safely:  A safe and healthy way to lose weight is to eat fewer calories and get regular exercise  You can lose up about 1 pound a week by decreasing the number of calories you eat by 500 calories each day  Healthy meal plan for weight management:  A healthy meal plan includes a variety of foods, contains fewer calories, and helps you stay healthy  A healthy meal plan includes the following:  · Eat whole-grain foods more often  A healthy meal plan should contain fiber  Fiber is the part of grains, fruits, and vegetables that is not broken down by your body  Whole-grain foods are healthy and provide extra fiber in your diet  Some examples of whole-grain foods are whole-wheat breads and pastas, oatmeal, brown rice, and bulgur  · Eat a variety of vegetables every day  Include dark, leafy greens such as spinach, kale, pj greens, and mustard greens  Eat yellow and orange vegetables such as carrots, sweet potatoes, and winter squash  · Eat a variety of fruits every day  Choose fresh or canned fruit (canned in its own juice or light syrup) instead of juice  Fruit juice has very little or no fiber  · Eat low-fat dairy foods  Drink fat-free (skim) milk or 1% milk  Eat fat-free yogurt and low-fat cottage cheese  Try low-fat cheeses such as mozzarella and other reduced-fat cheeses  · Choose meat and other protein foods that are low in fat  Choose beans or other legumes such as split peas or lentils  Choose fish, skinless poultry (chicken or turkey), or lean cuts of red meat (beef or pork)  Before you cook meat or poultry, cut off any visible fat  · Use less fat and oil  Try baking foods instead of frying them  Add less fat, such as margarine, sour cream, regular salad dressing and mayonnaise to foods  Eat fewer high-fat foods  Some examples of high-fat foods include french fries, doughnuts, ice cream, and cakes  · Eat fewer sweets  Limit foods and drinks that are high in sugar  This includes candy, cookies, regular soda, and sweetened drinks    Exercise:  Exercise at least 30 minutes per day on most days of the week  Some examples of exercise include walking, biking, dancing, and swimming  You can also fit in more physical activity by taking the stairs instead of the elevator or parking farther away from stores  Ask your healthcare provider about the best exercise plan for you  © Copyright [employer] 2018 Information is for End User's use only and may not be sold, redistributed or otherwise used for commercial purposes   All illustrations and images included in CareNotes® are the copyrighted property of [employer]  or [address] [place] [address] [employer]

## 2023-01-13 ENCOUNTER — TELEPHONE (OUTPATIENT)
Dept: CARDIAC SURGERY | Facility: CLINIC | Age: 78
End: 2023-01-13

## 2023-01-13 DIAGNOSIS — I35.0 NONRHEUMATIC AORTIC VALVE STENOSIS: ICD-10-CM

## 2023-01-13 DIAGNOSIS — Z95.2 S/P TAVR (TRANSCATHETER AORTIC VALVE REPLACEMENT): Primary | ICD-10-CM

## 2023-02-02 DIAGNOSIS — E78.5 HYPERLIPIDEMIA, UNSPECIFIED HYPERLIPIDEMIA TYPE: ICD-10-CM

## 2023-02-02 RX ORDER — SIMVASTATIN 10 MG
10 TABLET ORAL DAILY
Qty: 90 TABLET | Refills: 0 | Status: SHIPPED | OUTPATIENT
Start: 2023-02-02

## 2023-02-03 DIAGNOSIS — I10 BENIGN ESSENTIAL HTN: ICD-10-CM

## 2023-02-03 RX ORDER — LOSARTAN POTASSIUM 25 MG/1
25 TABLET ORAL DAILY
Qty: 90 TABLET | Refills: 1 | Status: SHIPPED | OUTPATIENT
Start: 2023-02-03

## 2023-02-06 ENCOUNTER — TELEPHONE (OUTPATIENT)
Dept: FAMILY MEDICINE CLINIC | Facility: HOSPITAL | Age: 78
End: 2023-02-06

## 2023-02-06 NOTE — TELEPHONE ENCOUNTER
Called pt to reschedule because he is scheduled on a day you will no longer be working  He asked for appt to be moved sooner  States the new BP med he is on doesn't seem to be doing anything    Appt moved to 2/17

## 2023-02-10 ENCOUNTER — RA CDI HCC (OUTPATIENT)
Dept: OTHER | Facility: HOSPITAL | Age: 78
End: 2023-02-10

## 2023-02-10 NOTE — PROGRESS NOTES
[address]  coding opportunities       Chart reviewed, no opportunity found: CHART REVIEWED, NO OPPORTUNITY FOUND        Patients Insurance     Medicare Insurance: Medicare

## 2023-02-17 ENCOUNTER — OFFICE VISIT (OUTPATIENT)
Dept: FAMILY MEDICINE CLINIC | Facility: HOSPITAL | Age: 78
End: 2023-02-17

## 2023-02-17 VITALS
HEART RATE: 60 BPM | SYSTOLIC BLOOD PRESSURE: 138 MMHG | HEIGHT: 69 IN | DIASTOLIC BLOOD PRESSURE: 82 MMHG | WEIGHT: 208 LBS | BODY MASS INDEX: 30.81 KG/M2 | TEMPERATURE: 97.7 F

## 2023-02-17 DIAGNOSIS — I50.9 CONGESTIVE HEART FAILURE, UNSPECIFIED HF CHRONICITY, UNSPECIFIED HEART FAILURE TYPE (HCC): ICD-10-CM

## 2023-02-17 DIAGNOSIS — I35.0 SEVERE AORTIC STENOSIS: ICD-10-CM

## 2023-02-17 DIAGNOSIS — I25.10 CORONARY ARTERY DISEASE INVOLVING NATIVE HEART WITHOUT ANGINA PECTORIS, UNSPECIFIED VESSEL OR LESION TYPE: Primary | ICD-10-CM

## 2023-02-17 DIAGNOSIS — R73.03 PREDIABETES: ICD-10-CM

## 2023-02-17 DIAGNOSIS — E78.2 MIXED HYPERLIPIDEMIA: ICD-10-CM

## 2023-02-17 DIAGNOSIS — Z95.2 S/P TAVR (TRANSCATHETER AORTIC VALVE REPLACEMENT): ICD-10-CM

## 2023-02-20 NOTE — PROGRESS NOTES
Name: Brie Zeng      :       MRN: [id number]  Encounter Provider: Ace Thomson MD  Encounter Date: 2023   Encounter department: [facility]      1  Coronary artery disease involving native heart without angina pectoris, unspecified vessel or lesion type    2  S/P TAVR (transcatheter aortic valve replacement)    3  Congestive heart failure, unspecified HF chronicity, unspecified heart failure type ([address] )    4  Prediabetes    5  Mixed hyperlipidemia    6  Severe aortic stenosis       Glenetta Fleischer has been doing well  Continue the same medications  Working on dietary troll and exercise  Continue follow-up with cardiology  Subjective      Patient here for follow-up of chronic disease  Overall he has been feeling well  No issues with medications  Trying to keep active  Working on improving his diet  Review of Systems   Constitutional: Negative  Negative for activity change, appetite change, chills and diaphoresis  HENT: Negative for congestion and dental problem  Respiratory: Negative  Negative for apnea, chest tightness, shortness of breath and wheezing  Cardiovascular: Negative  Negative for chest pain, palpitations and leg swelling  Gastrointestinal: Negative  Negative for abdominal distention, abdominal pain, constipation, diarrhea and nausea  Genitourinary: Negative  Negative for difficulty urinating, dysuria and frequency  Current Outpatient Medications on File Prior to Visit   Medication Sig   • aspirin 81 mg chewable tablet Chew 1 tablet (81 mg total) daily   • latanoprost (XALATAN) 0 005 % ophthalmic solution INSTILL 1 DROP INTO EACH EYE ONCE DAILY AT NIGHT   • losartan (COZAAR) 25 mg tablet TAKE 1 TABLET (25 MG TOTAL) BY MOUTH DAILY     • metoprolol tartrate (LOPRESSOR) 25 mg tablet Take 0 5 tablets (12 5 mg total) by mouth every 12 (twelve) hours   • simvastatin (ZOCOR) 10 mg tablet Take 1 tablet (10 mg total) by mouth daily   • [DISCONTINUED] Lumigan 0 01 % ophthalmic drops  (Patient not taking: Reported on 10/28/2022)       Objective     /82   Pulse 60   Temp 97 7 °F (36 5 °C)   Ht 5' 9" (1 753 m)   Wt 94 3 kg (208 lb)   BMI 30 72 kg/m²     Physical Exam  Vitals and nursing note reviewed  Constitutional:       General: He is not in acute distress  Appearance: Normal appearance  He is well-developed  He is obese  He is not ill-appearing  HENT:      Head: Normocephalic and atraumatic  Right Ear: Tympanic membrane, ear canal and external ear normal       Left Ear: Tympanic membrane, ear canal and external ear normal       Nose: Nose normal  No congestion or rhinorrhea  Mouth/Throat:      Mouth: Mucous membranes are moist       Pharynx: No oropharyngeal exudate or posterior oropharyngeal erythema  Eyes:      Extraocular Movements: Extraocular movements intact  Conjunctiva/sclera: Conjunctivae normal       Pupils: Pupils are equal, round, and reactive to light  Cardiovascular:      Rate and Rhythm: Normal rate and regular rhythm  Heart sounds: Normal heart sounds  No murmur heard  No friction rub  No gallop  Pulmonary:      Effort: Pulmonary effort is normal  No respiratory distress  Breath sounds: Normal breath sounds  No wheezing or rales  Chest:      Chest wall: No tenderness  Abdominal:      General: Bowel sounds are normal  There is no distension  Palpations: Abdomen is soft  There is no mass  Tenderness: There is no abdominal tenderness  There is no guarding or rebound  Musculoskeletal:         General: Normal range of motion  Cervical back: Normal range of motion and neck supple  Skin:     General: Skin is warm  Capillary Refill: Capillary refill takes less than 2 seconds  Neurological:      Mental Status: He is alert and oriented to person, place, and time     Psychiatric:         Mood and Affect: Mood normal  Behavior: Behavior normal        Austin Martinez MD

## 2023-02-27 ENCOUNTER — APPOINTMENT (OUTPATIENT)
Dept: LAB | Facility: HOSPITAL | Age: 78
End: 2023-02-27

## 2023-02-27 DIAGNOSIS — I35.0 NONRHEUMATIC AORTIC VALVE STENOSIS: ICD-10-CM

## 2023-02-27 DIAGNOSIS — Z95.2 S/P TAVR (TRANSCATHETER AORTIC VALVE REPLACEMENT): ICD-10-CM

## 2023-02-27 LAB
ANION GAP SERPL CALCULATED.3IONS-SCNC: 4 MMOL/L (ref 4–13)
BASOPHILS # BLD AUTO: 0.05 THOUSANDS/ÂΜL (ref 0–0.1)
BASOPHILS NFR BLD AUTO: 1 % (ref 0–1)
BUN SERPL-MCNC: 25 MG/DL (ref 5–25)
CALCIUM SERPL-MCNC: 9.1 MG/DL (ref 8.3–10.1)
CHLORIDE SERPL-SCNC: 111 MMOL/L (ref 96–108)
CO2 SERPL-SCNC: 26 MMOL/L (ref 21–32)
CREAT SERPL-MCNC: 1.07 MG/DL (ref 0.6–1.3)
EOSINOPHIL # BLD AUTO: 0.13 THOUSAND/ÂΜL (ref 0–0.61)
EOSINOPHIL NFR BLD AUTO: 2 % (ref 0–6)
ERYTHROCYTE [DISTWIDTH] IN BLOOD BY AUTOMATED COUNT: 11.7 % (ref 11.6–15.1)
GFR SERPL CREATININE-BSD FRML MDRD: 67 ML/MIN/1.73SQ M
GLUCOSE P FAST SERPL-MCNC: 121 MG/DL (ref 65–99)
HCT VFR BLD AUTO: 42.2 % (ref 36.5–49.3)
HGB BLD-MCNC: 13.6 G/DL (ref 12–17)
IMM GRANULOCYTES # BLD AUTO: 0.04 THOUSAND/UL (ref 0–0.2)
IMM GRANULOCYTES NFR BLD AUTO: 1 % (ref 0–2)
LYMPHOCYTES # BLD AUTO: 1.56 THOUSANDS/ÂΜL (ref 0.6–4.47)
LYMPHOCYTES NFR BLD AUTO: 25 % (ref 14–44)
MCH RBC QN AUTO: 33.2 PG (ref 26.8–34.3)
MCHC RBC AUTO-ENTMCNC: 32.2 G/DL (ref 31.4–37.4)
MCV RBC AUTO: 103 FL (ref 82–98)
MONOCYTES # BLD AUTO: 0.66 THOUSAND/ÂΜL (ref 0.17–1.22)
MONOCYTES NFR BLD AUTO: 11 % (ref 4–12)
NEUTROPHILS # BLD AUTO: 3.84 THOUSANDS/ÂΜL (ref 1.85–7.62)
NEUTS SEG NFR BLD AUTO: 60 % (ref 43–75)
NRBC BLD AUTO-RTO: 0 /100 WBCS
PLATELET # BLD AUTO: 201 THOUSANDS/UL (ref 149–390)
PMV BLD AUTO: 10.4 FL (ref 8.9–12.7)
POTASSIUM SERPL-SCNC: 4.2 MMOL/L (ref 3.5–5.3)
RBC # BLD AUTO: 4.1 MILLION/UL (ref 3.88–5.62)
SODIUM SERPL-SCNC: 141 MMOL/L (ref 135–147)
WBC # BLD AUTO: 6.28 THOUSAND/UL (ref 4.31–10.16)

## 2023-03-01 ENCOUNTER — HOSPITAL ENCOUNTER (OUTPATIENT)
Dept: NON INVASIVE DIAGNOSTICS | Age: 78
Discharge: HOME | End: 2023-03-01

## 2023-03-01 VITALS
DIASTOLIC BLOOD PRESSURE: 82 MMHG | BODY MASS INDEX: 30.81 KG/M2 | HEART RATE: 57 BPM | WEIGHT: 208 LBS | HEIGHT: 69 IN | SYSTOLIC BLOOD PRESSURE: 138 MMHG

## 2023-03-01 DIAGNOSIS — Z95.2 S/P TAVR (TRANSCATHETER AORTIC VALVE REPLACEMENT): ICD-10-CM

## 2023-03-01 DIAGNOSIS — I35.0 NONRHEUMATIC AORTIC VALVE STENOSIS: ICD-10-CM

## 2023-03-01 LAB
AORTIC ROOT: 3.7 CM
AORTIC VALVE MEAN VELOCITY: 16.7 M/S
APICAL FOUR CHAMBER EJECTION FRACTION: 62 %
ASCENDING AORTA: 3 CM
AV AREA BY CONTINUOUS VTI: 1.4 CM2
AV AREA PEAK VELOCITY: 1.4 CM2
AV LVOT MEAN GRADIENT: 1 MMHG
AV LVOT PEAK GRADIENT: 3 MMHG
AV MEAN GRADIENT: 13 MMHG
AV PEAK GRADIENT: 24 MMHG
AV VALVE AREA: 1.44 CM2
AV VELOCITY RATIO: 0.33
DOP CALC AO PEAK VEL: 2.43 M/S
DOP CALC AO VTI: 62.32 CM
DOP CALC LVOT AREA: 4.15 CM2
DOP CALC LVOT DIAMETER: 2.3 CM
DOP CALC LVOT PEAK VEL VTI: 21.57 CM
DOP CALC LVOT PEAK VEL: 0.79 M/S
DOP CALC LVOT STROKE INDEX: 41.9 ML/M2
DOP CALC LVOT STROKE VOLUME: 89.57 CM3
E WAVE DECELERATION TIME: 180 MS
FRACTIONAL SHORTENING: 27 % (ref 28–44)
INTERVENTRICULAR SEPTUM IN DIASTOLE (PARASTERNAL SHORT AXIS VIEW): 1.2 CM
INTERVENTRICULAR SEPTUM: 1.2 CM (ref 0.6–1.1)
LAAS-AP2: 20.6 CM2
LAAS-AP4: 22.6 CM2
LEFT ATRIUM SIZE: 4.4 CM
LEFT INTERNAL DIMENSION IN SYSTOLE: 4 CM (ref 2.1–4)
LEFT VENTRICLE DIASTOLIC VOLUME (MOD BIPLANE): 198 ML
LEFT VENTRICLE SYSTOLIC VOLUME (MOD BIPLANE): 75 ML
LEFT VENTRICULAR INTERNAL DIMENSION IN DIASTOLE: 5.5 CM (ref 3.5–6)
LEFT VENTRICULAR POSTERIOR WALL IN END DIASTOLE: 1.2 CM
LEFT VENTRICULAR STROKE VOLUME: 80 ML
LV EF: 62 %
LVSV (TEICH): 80 ML
MV E'TISSUE VEL-SEP: 5 CM/S
MV PEAK A VEL: 0.8 M/S
MV PEAK E VEL: 52 CM/S
MV STENOSIS PRESSURE HALF TIME: 52 MS
MV VALVE AREA P 1/2 METHOD: 4.23 CM2
RIGHT ATRIUM AREA SYSTOLE A4C: 19.7 CM2
RIGHT VENTRICLE ID DIMENSION: 3.7 CM
SL CV LEFT ATRIUM LENGTH A2C: 5.1 CM
SL CV LV EF: 55
SL CV PED ECHO LEFT VENTRICLE DIASTOLIC VOLUME (MOD BIPLANE) 2D: 150 ML
SL CV PED ECHO LEFT VENTRICLE SYSTOLIC VOLUME (MOD BIPLANE) 2D: 70 ML
TR MAX PG: 25 MMHG
TR PEAK VELOCITY: 2.5 M/S
TRICUSPID ANNULAR PLANE SYSTOLIC EXCURSION: 2.7 CM
TRICUSPID VALVE PEAK REGURGITATION VELOCITY: 2.48 M/S

## 2023-03-02 LAB
ATRIAL RATE: 58 BPM
P AXIS: 11 DEGREES
PR INTERVAL: 182 MS
QRS AXIS: -35 DEGREES
QRSD INTERVAL: 144 MS
QT INTERVAL: 440 MS
QTC INTERVAL: 431 MS
T WAVE AXIS: 64 DEGREES
VENTRICULAR RATE: 58 BPM

## 2023-03-06 NOTE — PROGRESS NOTES
1 YEAR FOLLOW UP VISIT S/P TAVR    Procedure: S/P transfemoral transcatheter aortic valve replacement, performed on 2/22/22    History of Present Illness: Betina Hobbs is a 69y o  year old male who presents to our office today for one year follow up care from transfemoral transcatheter aortic valve replacement  He was evaluated in our office for his 30 day post TAVR visit in April 2022, and at that time he was doing well, and had started cardiac rehab  He has followed up with his PCP and with Dr Sejal Martinez, most recently seen by cardiology in October  He reports that he recently started on cosopt eye drops due to glaucoma  His PCP recently started him on losartan due to suboptimally controlled blood pressures  Patient checks his blood pressure twice daily, and mostly has SBP in 130's, but occasionally up to 150's  Reviewed prior visit notes from the last year prior to this visit  Patient is accompanied by his wife today  He states that he is feeling well overall  He is working on losing weight  He walks every day, and goes to the gym 5 days a week  No BALES, orthopnea, PND, LE edema, chest pain, palpitations  He is compliant with his medications  He is a lifelong nonsmoker, and drinks alcohol occasionally  Overall, he is satisfied with his life following TAVR       Review of Systems:  Review of Systems - History obtained from the patient  General ROS: negative  Psychological ROS: negative  Ophthalmic ROS: negative  ENT ROS: negative  Allergy and Immunology ROS: negative  Hematological and Lymphatic ROS: negative  Endocrine ROS: negative  Respiratory ROS: no cough, shortness of breath, or wheezing  Cardiovascular ROS: no chest pain or dyspnea on exertion  Gastrointestinal ROS: no abdominal pain, change in bowel habits, or black or bloody stools  Genito-Urinary ROS: no dysuria, trouble voiding, or hematuria  Musculoskeletal ROS: negative  Neurological ROS: no TIA or stroke symptoms  Dermatological ROS: negative Past Medical History:    Past Medical History:   Diagnosis Date   • Aneurysm of abdominal vessel     hypogastric artery, 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   • CAD (coronary artery disease)    • Cataract    • CHF (congestive heart failure) ([facility])    • Encounter for pre-operative laboratory testing 2/15/2022   • Glaucoma    • Hyperchloremia 2/22/2022   • Hyperlipidemia    • Moderate aortic insufficiency    • Prediabetes    • Severe aortic stenosis    • Sinus bradycardia    • Umbilical hernia        Past Surgical History:    Past Surgical History:   Procedure Laterality Date   • ARTHROSCOPY KNEE Right    • BUNIONECTOMY Right    • CARDIAC CATHETERIZATION Bilateral 2/14/2022    Procedure: Cardiac catheterization;  Surgeon: Alex Ambrose MD;  Location: [facility]; Service: Cardiology   • CARDIAC CATHETERIZATION N/A 2/22/2022    Procedure: CARDIAC TAVR;  Surgeon: Lynette Aleman MD;  Location: [facility] MAIN [facility];  Service: Cardiology   • HEMORRHOID SURGERY     • OH ECHO TRANSESOPHAG R-T 2D W/PRB IMG ACQUISJ I&R N/A 2/22/2022    Procedure: TRANSESOPHAGEAL ECHOCARDIOGRAM (ELISSA); Surgeon: Alaina Lopez MD;  Location: [facility];  Service: Cardiac Surgery   • OH REPLACE AORTIC VALVE OPENFEMORAL ARTERY APPROACH N/A 2/22/2022    Procedure: REPLACEMENT AORTIC VALVE TRANSCATHETER (TAVR) TRANSFEMORAL W/ 29MM SARKAR PAULO S3 ULTRA VALVE(ACCESS ON LEFT); Surgeon: Alaina Lopez MD;  Location: [facility];  Service: Cardiac Surgery   • SHOULDER SURGERY  2012   • TONSILLECTOMY      childhood       Vital Signs: There were no vitals filed for this visit  Home Medications:     Prior to Admission medications    Medication Sig Start Date End Date Taking?  Authorizing Provider   aspirin 81 mg chewable tablet Chew 1 tablet (81 mg total) daily 2/24/22   Pati Howell PA-C   latanoprost (XALATAN) 0 005 % ophthalmic solution INSTILL 1 DROP INTO [facility] EYE ONCE DAILY AT NIGHT 8/7/22   Historical Provider, MD   losartan (COZAAR) 25 mg tablet TAKE 1 TABLET (25 MG TOTAL) BY MOUTH DAILY  2/3/23   Chet Delarosa MD   metoprolol tartrate (LOPRESSOR) 25 mg tablet Take 0 5 tablets (12 5 mg total) by mouth every 12 (twelve) hours 4/25/22   Macy Randall MD   simvastatin (ZOCOR) 10 mg tablet Take 1 tablet (10 mg total) by mouth daily 2/2/23   Adryan Griffin MD       Allergies:    [unfilled]    Physical Exam:    General: alert, oriented, NAD   HEENT/NECK:  PERRL  No jugular venous distention  Cardiac:Regular rate and rhythm  Pulmonary:Breath sounds clear bilaterally  Abdomen:  Non-tender, Non-distended  Positive bowel sounds  Upper extremities: 2+ radial pulses; brisk capillary refill  Lower extremities: Extremities warm/dry  PT/DP pulses 2+ bilaterally  No edema B/L  Musculoskeletal: AWAN   Neuro: Alert and oriented X 3  Sensation is grossly intact  No focal deficits  Skin: Warm/Dry, without rashes or lesions  Lab Results:   Lab Results   Component Value Date    WBC 6 28 02/27/2023    HGB 13 6 02/27/2023    HCT 42 2 02/27/2023     (H) 02/27/2023     02/27/2023     Lab Results   Component Value Date    GLUCOSE 99 02/22/2022    CALCIUM 9 1 02/27/2023     11/20/2014    K 4 2 02/27/2023    CO2 26 02/27/2023     (H) 02/27/2023    BUN 25 02/27/2023    CREATININE 1 07 02/27/2023       Imaging Studies:     Echocardiogram: 3/1/23  Interpretation Summary       •  Left Ventricle: Left ventricular cavity size is normal  Wall thickness is mildly increased  The left ventricular ejection fraction is 55%  Systolic function is normal  Wall motion is normal  Diastolic function is mildly abnormal, consistent with grade I (abnormal) relaxation  There is mild concentric hypertrophy  •  Right Ventricle: Right ventricular cavity size is normal  Systolic function is normal   •  Left Atrium: The atrium is mildly dilated  •  Aortic Valve: There is an Pan PAULO 3 Ultra 29 mm TAVR bioprosthetic valve   The prosthetic valve appears well-seated and appears to be functioning normally  There is no evidence of paravalvular regurgitation  There is no evidence of transvalvular regurgitation  The mean gradient is 13 mmHg and the calculated MEHUL = 1 4 cm2  •  Mitral Valve: There is mild annular calcification  There is mild regurgitation      Findings    Left Ventricle Left ventricular cavity size is normal  Wall thickness is mildly increased  There is mild concentric hypertrophy  The left ventricular ejection fraction is 55%  Systolic function is normal   Wall motion is normal  Diastolic function is mildly abnormal, consistent with grade I (abnormal) relaxation  Right Ventricle Right ventricular cavity size is normal  Systolic function is normal  Wall thickness is normal    Left Atrium The atrium is mildly dilated  Right Atrium The atrium is normal in size  Aortic Valve There is an Pan PAULO 3 Ultra 29 mm TAVR bioprosthetic valve  The prosthetic valve appears well-seated and appears to be functioning normally  There is no evidence of paravalvular regurgitation  There is no evidence of transvalvular regurgitation  The mean gradient is 13 mmHg and the calculated MEHUL = 1 4 cm2  Mitral Valve There is mild calcification  There is mild annular calcification  There is mild regurgitation  There is no evidence of stenosis  Tricuspid Valve Tricuspid valve structure is normal  There is trace regurgitation  There is no evidence of stenosis  Pulmonic Valve Pulmonic valve structure is normal  There is no evidence of regurgitation  There is no evidence of stenosis  Ascending Aorta The aortic root is normal in size  IVC/SVC The inferior vena cava is normal in size  Respirophasic changes were normal    Pericardium There is no pericardial effusion  The pericardium is normal in appearance       Left Ventricle Measurements    Function/Volumes   A4C EF 62 %         LVOT stroke volume 89 57 cm3         LVOT stroke volume index 41 9 ml/m2 LV Diastolic Volume (BP) 013 mL         LV Systolic Volume (BP) 75 mL         EF 62 %         Dimensions   LVIDd 5 5 cm         LVIDS 4 cm         IVSd 1 2 cm         LVPWd 1 2 cm         LVOT area 4 15 cm2         FS 27 %         Diastolic Filling   MV E' Tissue Velocity Septal 5 cm/s         E wave deceleration time 180 ms         MV Peak E Dominic 52 cm/s         MV Peak A Dominic 0 8 m/s          Report Measurements   AV LVOT peak gradient 3 mmHg              Interventricular Septum Measurements    Shunt Ratio   LVOT peak VTI 21 57 cm         LVOT peak dominic 0 79 m/s              Right Ventricle Measurements    Dimensions   RVID d 3 7 cm         Tricuspid annular plane systolic excursion 2 7 cm               Left Atrium Measurements    Dimensions   LA size 4 4 cm         LA length (A2C) 5 1 cm               Right Atrium Measurements    Dimensions   RAA A4C 19 7 cm2               Atrial Septum Measurements    Shunt Ratio   LVOT peak VTI 21 57 cm         LVOT peak dominic 0 79 m/s               Aortic Valve Measurements    Stenosis   Aortic valve peak velocity 2 43 m/s         LVOT peak dominic 0 79 m/s         Ao VTI 62 32 cm         LVOT peak VTI 21 57 cm         AV mean gradient 13 mmHg         LVOT mn grad 1 mmHg         AV peak gradient 24 mmHg         AV LVOT peak gradient 3 mmHg         Area/Dimensions   DVI 0 33          AV valve area 1 44 cm2         AV area by cont VTI 1 4 cm2         AV area peak dominic 1 4 cm2         LVOT diameter 2 3 cm         LVOT area 4 15 cm2               Mitral Valve Measurements    Stenosis   MV stenosis pressure 1/2 time 52 ms         MV valve area p 1/2 method 4 23 cm2               Tricuspid Valve Measurements    RVSP Parameters   TR Peak Dominic 2 5 m/s         Triscuspid Valve Regurgitation Peak Gradient 25 mmHg               Aorta Measurements    Aortic Dimensions   Ao root 3 7 cm         Asc Ao 3 cm                  EKG: 3/7/23  Pending     I have personally reviewed pertinent reports  TAVR evaluation Assessment:     Roxanna Mendez 122: I    [id number] completed     Assessment: Aortic stenosis, Non-Rheumatic  S/P transfemoral transcatheter aortic valve replacement;    Plan:     Princess Pérez returns to our office today for 1 year routine follow up s/p  transfemoral transcatheter aortic valve replacement   Their NYHA functional status is class I  They are Asymptomatic with activities  Weight and VS are stable  Recent echocardiogram demonstrates well seated valve, no regurgitation  ECG, CBC and BMP are WNL  I reviewed medications and made no changes  We recommend continued aspirin and low dose statin therapy as tolerated  Princess Pérez does not need to return to our office for follow up in the future but will continue to be managed by their primary care physician and cardiologist for ongoing medical care  We recommend yearly echocardiograms which can be ordered and monitored by their cardiologist   Princess Pérez was comfortable with our recommendations and their questions were answered to their satisfaction      Routine referral to gastroenterology for colonoscopy screening was not indicated, as the patient is over 77years old    Tiffanie Duque PA-C  [unfilled]  4:13 PM

## 2023-03-07 ENCOUNTER — OFFICE VISIT (OUTPATIENT)
Dept: CARDIAC SURGERY | Facility: CLINIC | Age: 78
End: 2023-03-07

## 2023-03-07 VITALS
SYSTOLIC BLOOD PRESSURE: 155 MMHG | DIASTOLIC BLOOD PRESSURE: 72 MMHG | WEIGHT: 208.1 LBS | HEIGHT: 69 IN | TEMPERATURE: 97.1 F | HEART RATE: 57 BPM | BODY MASS INDEX: 30.82 KG/M2 | OXYGEN SATURATION: 99 %

## 2023-03-07 DIAGNOSIS — Z95.2 S/P TAVR (TRANSCATHETER AORTIC VALVE REPLACEMENT): Primary | ICD-10-CM

## 2023-03-07 RX ORDER — DORZOLAMIDE HYDROCHLORIDE AND TIMOLOL MALEATE 20; 5 MG/ML; MG/ML
SOLUTION/ DROPS OPHTHALMIC
COMMUNITY
Start: 2023-02-24

## 2023-03-13 DIAGNOSIS — E78.5 HYPERLIPIDEMIA, UNSPECIFIED HYPERLIPIDEMIA TYPE: ICD-10-CM

## 2023-03-13 RX ORDER — SIMVASTATIN 10 MG
TABLET ORAL
Qty: 90 TABLET | Refills: 0 | Status: SHIPPED | OUTPATIENT
Start: 2023-03-13

## 2023-03-22 ENCOUNTER — OFFICE VISIT (OUTPATIENT)
Dept: FAMILY MEDICINE CLINIC | Facility: HOSPITAL | Age: 78
End: 2023-03-22

## 2023-03-22 VITALS
WEIGHT: 205.8 LBS | BODY MASS INDEX: 30.48 KG/M2 | SYSTOLIC BLOOD PRESSURE: 148 MMHG | HEART RATE: 58 BPM | DIASTOLIC BLOOD PRESSURE: 78 MMHG | TEMPERATURE: 96.7 F | HEIGHT: 69 IN

## 2023-03-22 DIAGNOSIS — H61.22 IMPACTED CERUMEN OF LEFT EAR: Primary | ICD-10-CM

## 2023-03-22 NOTE — PROGRESS NOTES
Name: Carrol Antunez      :       MRN: [id number]  Encounter Provider: Desmond Green MD  Encounter Date: 3/22/2023   Encounter department: [facility] [place] [address]     Mark  Impacted cerumen of left ear  -     Ear cerumen removal     improved after left ear irrigation  Subjective      Ear feels clogged  Decreased hearing for a few days now  Has been using debrox  No feve,r no chills  No allergies  No sinus pressure  Review of Systems    Current Outpatient Medications on File Prior to Visit   Medication Sig   • aspirin 81 mg chewable tablet Chew 1 tablet (81 mg total) daily   • dorzolamide-timolol (COSOPT) 22 3-6 8 MG/ML ophthalmic solution INSTILL 1 DROP INTO BOTH EYES TWICE A DAY   • latanoprost (XALATAN) 0 005 % ophthalmic solution INSTILL 1 DROP INTO EACH EYE ONCE DAILY AT NIGHT   • losartan (COZAAR) 25 mg tablet TAKE 1 TABLET (25 MG TOTAL) BY MOUTH DAILY  • metoprolol tartrate (LOPRESSOR) 25 mg tablet Take 0 5 tablets (12 5 mg total) by mouth every 12 (twelve) hours   • simvastatin (ZOCOR) 10 mg tablet TAKE 1 TABLET BY MOUTH EVERY DAY       Objective     /78   Pulse 58   Temp (!) 96 7 °F (35 9 °C)   Ht 5' 9" (1 753 m)   Wt 93 4 kg (205 lb 12 8 oz)   BMI 30 39 kg/m²     Physical Exam  Vitals and nursing note reviewed  Constitutional:       Appearance: Normal appearance  HENT:      Head: Normocephalic  Right Ear: Tympanic membrane, ear canal and external ear normal       Left Ear: Tympanic membrane and ear canal normal  There is impacted cerumen  Ears:      Comments: Slight erythema of TM after irrigation  Neurological:      Mental Status: He is alert  Ear cerumen removal    Date/Time: 3/22/2023 8:53 AM  Performed by: Desmond Green MD  Authorized by: Desmond Green MD   Universal Protocol:  Consent: Verbal consent obtained    Risks and benefits: risks, benefits and alternatives were discussed  Consent given by: patient  Patient understanding: patient states understanding of the procedure being performed  Patient identity confirmed: verbally with patient      Patient location:  Bedside  Procedure details:     Local anesthetic:  None    Location:  R ear    Procedure type: irrigation with instrumentation      Instrumentation: curette      Approach:  Natural orifice  Post-procedure details:     Complication:  None    Hearing quality:  Improved    Patient tolerance of procedure:   Tolerated well, no immediate complications        Austin Martinez MD

## 2023-04-03 ENCOUNTER — RA CDI HCC (OUTPATIENT)
Dept: OTHER | Facility: HOSPITAL | Age: 78
End: 2023-04-03

## 2023-04-07 DIAGNOSIS — Z95.2 S/P TAVR (TRANSCATHETER AORTIC VALVE REPLACEMENT): ICD-10-CM

## 2023-05-10 ENCOUNTER — OFFICE VISIT (OUTPATIENT)
Dept: FAMILY MEDICINE CLINIC | Facility: HOSPITAL | Age: 78
End: 2023-05-10

## 2023-05-10 VITALS
BODY MASS INDEX: 30.33 KG/M2 | TEMPERATURE: 97.3 F | WEIGHT: 204.8 LBS | HEIGHT: 69 IN | DIASTOLIC BLOOD PRESSURE: 72 MMHG | SYSTOLIC BLOOD PRESSURE: 106 MMHG | HEART RATE: 50 BPM

## 2023-05-10 DIAGNOSIS — R73.03 PREDIABETES: ICD-10-CM

## 2023-05-10 DIAGNOSIS — I25.10 CORONARY ARTERY DISEASE INVOLVING NATIVE HEART WITHOUT ANGINA PECTORIS, UNSPECIFIED VESSEL OR LESION TYPE: ICD-10-CM

## 2023-05-10 DIAGNOSIS — I50.9 CONGESTIVE HEART FAILURE, UNSPECIFIED HF CHRONICITY, UNSPECIFIED HEART FAILURE TYPE (HCC): ICD-10-CM

## 2023-05-10 DIAGNOSIS — E78.5 HYPERLIPIDEMIA, UNSPECIFIED HYPERLIPIDEMIA TYPE: Primary | ICD-10-CM

## 2023-05-10 DIAGNOSIS — Z95.2 S/P TAVR (TRANSCATHETER AORTIC VALVE REPLACEMENT): ICD-10-CM

## 2023-05-10 NOTE — PROGRESS NOTES
Name: Rosy Gonzalez      :       MRN: [id number]  Encounter Provider: Mary Vizcaino MD  Encounter Date: 5/10/2023   [facility]      1  Hyperlipidemia, unspecified hyperlipidemia type  -     CBC; Future  -     Comprehensive metabolic panel; Future  -     Lipid Panel with Direct LDL reflex; Future  -     TSH, 3rd generation with Free T4 reflex; Future    2  S/P TAVR (transcatheter aortic valve replacement)    3  Congestive heart failure, unspecified HF chronicity, unspecified heart failure type (HCC)  -     TSH, 3rd generation with Free T4 reflex; Future    4  Coronary artery disease involving native heart without angina pectoris, unspecified vessel or lesion type    5  Prediabetes  -     Lipid Panel with Direct LDL reflex; Future  -     Hemoglobin A1C; Future  -     TSH, 3rd generation with Free T4 reflex; Future       Overall Aidan Hardwick is doing well  Blood pressure and heart rate are on the lower side but asymptomatic  Continue with the same medication regimen  Update blood work as outlined  Continue follow-up with cardiology  Patient is currently euvolemic  Continue working on dietary control and exercise  Recommend following diabetic friendly diet  Subjective      Patient is here for follow-up of chronic disease  He is doing well  Occasional lightheadedness in the morning  No chest pain no shortness of breath no orthopnea no PND no leg swelling  No issues with medication otherwise  Blood pressure on his blood pressure cuff ranging into the 130s to 140s  Diastolic seems to be in the 60s and 70s  Review of Systems   Constitutional: Negative  Negative for activity change, appetite change, chills and diaphoresis  HENT: Negative for congestion and dental problem  Respiratory: Negative  Negative for apnea, chest tightness, shortness of breath and wheezing  Cardiovascular: Negative    Negative for "chest pain, palpitations and leg swelling  Gastrointestinal: Negative  Negative for abdominal distention, abdominal pain, constipation, diarrhea and nausea  Genitourinary: Negative  Negative for difficulty urinating, dysuria and frequency  Current Outpatient Medications on File Prior to Visit   Medication Sig   • aspirin 81 mg chewable tablet Chew 1 tablet (81 mg total) daily   • dorzolamide-timolol (COSOPT) 22 3-6 8 MG/ML ophthalmic solution INSTILL 1 DROP INTO BOTH EYES TWICE A DAY   • latanoprost (XALATAN) 0 005 % ophthalmic solution INSTILL 1 DROP INTO EACH EYE ONCE DAILY AT NIGHT   • losartan (COZAAR) 25 mg tablet TAKE 1 TABLET (25 MG TOTAL) BY MOUTH DAILY  • metoprolol tartrate (LOPRESSOR) 25 mg tablet TAKE 1/2 TABLET BY MOUTH EVERY 12 HOURS   • simvastatin (ZOCOR) 10 mg tablet TAKE 1 TABLET BY MOUTH EVERY DAY       Objective     /72   Pulse (!) 50   Temp (!) 97 3 °F (36 3 °C)   Ht 5' 9\" (1 753 m)   Wt 92 9 kg (204 lb 12 8 oz)   BMI 30 24 kg/m²     Physical Exam  Vitals and nursing note reviewed  Constitutional:       General: He is not in acute distress  Appearance: He is well-developed  He is not ill-appearing  HENT:      Head: Normocephalic and atraumatic  Right Ear: Tympanic membrane, ear canal and external ear normal       Left Ear: Tympanic membrane, ear canal and external ear normal       Nose: Nose normal  No congestion or rhinorrhea  Mouth/Throat:      Mouth: Mucous membranes are moist       Pharynx: No oropharyngeal exudate or posterior oropharyngeal erythema  Eyes:      Extraocular Movements: Extraocular movements intact  Conjunctiva/sclera: Conjunctivae normal       Pupils: Pupils are equal, round, and reactive to light  Cardiovascular:      Rate and Rhythm: Normal rate and regular rhythm  Heart sounds: Normal heart sounds  No murmur heard  No friction rub  No gallop     Pulmonary:      Effort: Pulmonary effort is normal  No respiratory " distress  Breath sounds: Normal breath sounds  No wheezing or rales  Chest:      Chest wall: No tenderness  Abdominal:      General: Bowel sounds are normal  There is no distension  Palpations: Abdomen is soft  There is no mass  Tenderness: There is no abdominal tenderness  There is no guarding or rebound  Musculoskeletal:         General: Normal range of motion  Cervical back: Normal range of motion and neck supple  Skin:     General: Skin is warm  Capillary Refill: Capillary refill takes less than 2 seconds  Neurological:      Mental Status: He is alert and oriented to person, place, and time     Psychiatric:         Mood and Affect: Mood normal          Behavior: Behavior normal        Jaylon Flores MD

## 2023-05-25 DIAGNOSIS — E78.5 HYPERLIPIDEMIA, UNSPECIFIED HYPERLIPIDEMIA TYPE: ICD-10-CM

## 2023-05-25 RX ORDER — SIMVASTATIN 10 MG
TABLET ORAL
Qty: 90 TABLET | Refills: 0 | Status: SHIPPED | OUTPATIENT
Start: 2023-05-25

## 2023-07-17 DIAGNOSIS — I10 BENIGN ESSENTIAL HTN: ICD-10-CM

## 2023-07-17 RX ORDER — LOSARTAN POTASSIUM 25 MG/1
25 TABLET ORAL DAILY
Qty: 90 TABLET | Refills: 1 | Status: SHIPPED | OUTPATIENT
Start: 2023-07-17

## 2023-08-11 ENCOUNTER — RA CDI HCC (OUTPATIENT)
Dept: OTHER | Facility: HOSPITAL | Age: 78
End: 2023-08-11

## 2023-08-11 NOTE — PROGRESS NOTES
[address] [facility] coding opportunities          Chart Reviewed number of suggestions sent to Provider: 1  I11.0     Patients Insurance     Medicare Insurance: [employer]

## 2023-08-21 ENCOUNTER — APPOINTMENT (OUTPATIENT)
Dept: LAB | Facility: HOSPITAL | Age: 78
End: 2023-08-21
Payer: MEDICARE

## 2023-08-21 DIAGNOSIS — E78.5 HYPERLIPIDEMIA, UNSPECIFIED HYPERLIPIDEMIA TYPE: ICD-10-CM

## 2023-08-21 DIAGNOSIS — R73.03 PREDIABETES: ICD-10-CM

## 2023-08-21 DIAGNOSIS — I50.9 CONGESTIVE HEART FAILURE, UNSPECIFIED HF CHRONICITY, UNSPECIFIED HEART FAILURE TYPE (HCC): ICD-10-CM

## 2023-08-21 LAB
ALBUMIN SERPL BCP-MCNC: 3.6 G/DL (ref 3.5–5)
ALP SERPL-CCNC: 53 U/L (ref 46–116)
ALT SERPL W P-5'-P-CCNC: 31 U/L (ref 12–78)
ANION GAP SERPL CALCULATED.3IONS-SCNC: 6 MMOL/L
AST SERPL W P-5'-P-CCNC: 19 U/L (ref 5–45)
BILIRUB SERPL-MCNC: 0.48 MG/DL (ref 0.2–1)
BUN SERPL-MCNC: 26 MG/DL (ref 5–25)
CALCIUM SERPL-MCNC: 9.1 MG/DL (ref 8.3–10.1)
CHLORIDE SERPL-SCNC: 112 MMOL/L (ref 96–108)
CHOLEST SERPL-MCNC: 158 MG/DL
CO2 SERPL-SCNC: 25 MMOL/L (ref 21–32)
CREAT SERPL-MCNC: 1.15 MG/DL (ref 0.6–1.3)
ERYTHROCYTE [DISTWIDTH] IN BLOOD BY AUTOMATED COUNT: 11.8 % (ref 11.6–15.1)
EST. AVERAGE GLUCOSE BLD GHB EST-MCNC: 117 MG/DL
GFR SERPL CREATININE-BSD FRML MDRD: 61 ML/MIN/1.73SQ M
GLUCOSE P FAST SERPL-MCNC: 133 MG/DL (ref 65–99)
HBA1C MFR BLD: 5.7 %
HCT VFR BLD AUTO: 40.2 % (ref 36.5–49.3)
HDLC SERPL-MCNC: 54 MG/DL
HGB BLD-MCNC: 13.2 G/DL (ref 12–17)
LDLC SERPL CALC-MCNC: 83 MG/DL (ref 0–100)
MCH RBC QN AUTO: 33.8 PG (ref 26.8–34.3)
MCHC RBC AUTO-ENTMCNC: 32.8 G/DL (ref 31.4–37.4)
MCV RBC AUTO: 103 FL (ref 82–98)
PLATELET # BLD AUTO: 194 THOUSANDS/UL (ref 149–390)
PMV BLD AUTO: 11.1 FL (ref 8.9–12.7)
POTASSIUM SERPL-SCNC: 4.2 MMOL/L (ref 3.5–5.3)
PROT SERPL-MCNC: 6.7 G/DL (ref 6.4–8.4)
RBC # BLD AUTO: 3.91 MILLION/UL (ref 3.88–5.62)
SODIUM SERPL-SCNC: 143 MMOL/L (ref 135–147)
TRIGL SERPL-MCNC: 105 MG/DL
TSH SERPL DL<=0.05 MIU/L-ACNC: 0.95 UIU/ML (ref 0.45–4.5)
WBC # BLD AUTO: 6.26 THOUSAND/UL (ref 4.31–10.16)

## 2023-08-21 PROCEDURE — 36415 COLL VENOUS BLD VENIPUNCTURE: CPT

## 2023-08-21 PROCEDURE — 83036 HEMOGLOBIN GLYCOSYLATED A1C: CPT

## 2023-08-21 PROCEDURE — 84443 ASSAY THYROID STIM HORMONE: CPT

## 2023-08-21 PROCEDURE — 85027 COMPLETE CBC AUTOMATED: CPT

## 2023-08-21 PROCEDURE — 80053 COMPREHEN METABOLIC PANEL: CPT

## 2023-08-21 PROCEDURE — 80061 LIPID PANEL: CPT

## 2023-09-06 ENCOUNTER — OFFICE VISIT (OUTPATIENT)
Dept: FAMILY MEDICINE CLINIC | Facility: HOSPITAL | Age: 78
End: 2023-09-06
Payer: MEDICARE

## 2023-09-06 VITALS
DIASTOLIC BLOOD PRESSURE: 78 MMHG | BODY MASS INDEX: 29.83 KG/M2 | SYSTOLIC BLOOD PRESSURE: 118 MMHG | HEART RATE: 68 BPM | WEIGHT: 201.4 LBS | HEIGHT: 69 IN | TEMPERATURE: 99 F

## 2023-09-06 DIAGNOSIS — I50.9 CONGESTIVE HEART FAILURE, UNSPECIFIED HF CHRONICITY, UNSPECIFIED HEART FAILURE TYPE (HCC): ICD-10-CM

## 2023-09-06 DIAGNOSIS — Z95.2 S/P TAVR (TRANSCATHETER AORTIC VALVE REPLACEMENT): ICD-10-CM

## 2023-09-06 DIAGNOSIS — I25.10 CORONARY ARTERY DISEASE INVOLVING NATIVE HEART WITHOUT ANGINA PECTORIS, UNSPECIFIED VESSEL OR LESION TYPE: ICD-10-CM

## 2023-09-06 DIAGNOSIS — I10 BENIGN ESSENTIAL HTN: Primary | ICD-10-CM

## 2023-09-06 PROCEDURE — 99214 OFFICE O/P EST MOD 30 MIN: CPT | Performed by: FAMILY MEDICINE

## 2023-09-06 NOTE — PROGRESS NOTES
Name: Oralia Blandon      :       MRN: [id number]  Encounter Provider: Lesly De Santiago MD  Encounter Date: 2023   Encounter department: [address]     1. Benign essential HTN    2. Coronary artery disease involving native heart without angina pectoris, unspecified vessel or lesion type    3. Congestive heart failure, unspecified HF chronicity, unspecified heart failure type ([address])    4. S/P TAVR (transcatheter aortic valve replacement)       diastolic Bp readings at home have been on the low side ( 60) with occasional lightheadedness. Will dc losartan. cocnitnue with low dosing of beta blocker as prescrived. Continue with simvastatin. Continue with asa. Prediabetes. Discussed. Work on i[employer]. Fu in 6 months. Subjective      Pt here for fu. Last visit he had a lower heart rate and medications adjusted. Today reports an occasional lightheadedness. Bp readings at home have had readings of 77'R diastolic. No chest pain, no sob. Otherwise he has been feeling well. Review of Systems   Constitutional: Negative. Negative for activity change, appetite change, chills and diaphoresis. HENT: Negative for congestion and dental problem. Respiratory: Negative. Negative for apnea, chest tightness, shortness of breath and wheezing. Cardiovascular: Negative. Negative for chest pain, palpitations and leg swelling. Gastrointestinal: Negative. Negative for abdominal distention, abdominal pain, constipation, diarrhea and nausea. Genitourinary: Negative. Negative for difficulty urinating, dysuria and frequency.        Current Outpatient Medications on File Prior to Visit   Medication Sig   • aspirin 81 mg chewable tablet Chew 1 tablet (81 mg total) daily   • dorzolamide-timolol (COSOPT) 22.3-6.8 MG/ML ophthalmic solution INSTILL 1 DROP INTO BOTH EYES TWICE A DAY   • latanoprost (XALATAN) 0.005 % ophthalmic solution INSTILL 1 DROP INTO EACH EYE ONCE DAILY AT NIGHT   • metoprolol tartrate (LOPRESSOR) 25 mg tablet TAKE 1/2 TABLET BY MOUTH EVERY 12 HOURS   • simvastatin (ZOCOR) 10 mg tablet TAKE 1 TABLET BY MOUTH EVERY DAY   • [DISCONTINUED] losartan (COZAAR) 25 mg tablet TAKE 1 TABLET (25 MG TOTAL) BY MOUTH DAILY. Objective     /78   Pulse 68   Temp 99 °F (37.2 °C)   Ht 5' 9" (1.753 m)   Wt 91.4 kg (201 lb 6.4 oz)   BMI 29.74 kg/m²     Physical Exam  Vitals and nursing note reviewed. Constitutional:       General: He is not in acute distress. Appearance: Normal appearance. He is well-developed and normal weight. He is not ill-appearing. HENT:      Head: Normocephalic and atraumatic. Right Ear: External ear normal.      Left Ear: External ear normal.      Nose: Nose normal. No congestion or rhinorrhea. Mouth/Throat:      Mouth: Mucous membranes are moist.      Pharynx: No oropharyngeal exudate or posterior oropharyngeal erythema. Eyes:      Extraocular Movements: Extraocular movements intact. Conjunctiva/sclera: Conjunctivae normal.      Pupils: Pupils are equal, round, and reactive to light. Cardiovascular:      Rate and Rhythm: Normal rate and regular rhythm. Pulses: Normal pulses. Heart sounds: Murmur heard. No friction rub. No gallop. Pulmonary:      Effort: Pulmonary effort is normal. No respiratory distress. Breath sounds: Normal breath sounds. No wheezing or rales. Chest:      Chest wall: No tenderness. Abdominal:      General: Bowel sounds are normal. There is no distension. Palpations: Abdomen is soft. There is no mass. Tenderness: There is no abdominal tenderness. There is no guarding or rebound. Musculoskeletal:         General: Normal range of motion. Cervical back: Normal range of motion and neck supple. Skin:     General: Skin is warm. Capillary Refill: Capillary refill takes less than 2 seconds. Neurological:      General: No focal deficit present. Mental Status: He is alert and oriented to person, place, and time.    Psychiatric:         Mood and Affect: Mood normal.         Behavior: Behavior normal.       Meghan Forrester MD

## 2023-09-25 ENCOUNTER — TELEPHONE (OUTPATIENT)
Dept: FAMILY MEDICINE CLINIC | Facility: HOSPITAL | Age: 78
End: 2023-09-25

## 2023-09-25 NOTE — TELEPHONE ENCOUNTER
Please call. Yes, restart the losartan. Continue to monitor Blood pressure.    Exercise however will increase your blood pressure but should go down after some rest.

## 2023-09-25 NOTE — TELEPHONE ENCOUNTER
Patient reports that his blood pressure has gone up again. 170/79 as of this morning. He stated he was at the gym and he felt ok. Should he start the med again?     Please call to advise

## 2023-09-28 DIAGNOSIS — E78.5 HYPERLIPIDEMIA, UNSPECIFIED HYPERLIPIDEMIA TYPE: ICD-10-CM

## 2023-09-28 RX ORDER — SIMVASTATIN 10 MG
TABLET ORAL
Qty: 90 TABLET | Refills: 0 | Status: SHIPPED | OUTPATIENT
Start: 2023-09-28

## 2023-10-31 ENCOUNTER — OFFICE VISIT (OUTPATIENT)
Dept: CARDIOLOGY CLINIC | Facility: CLINIC | Age: 78
End: 2023-10-31
Payer: MEDICARE

## 2023-10-31 VITALS
DIASTOLIC BLOOD PRESSURE: 78 MMHG | SYSTOLIC BLOOD PRESSURE: 128 MMHG | HEART RATE: 67 BPM | WEIGHT: 207.2 LBS | BODY MASS INDEX: 30.6 KG/M2

## 2023-10-31 DIAGNOSIS — I10 PRIMARY HYPERTENSION: ICD-10-CM

## 2023-10-31 DIAGNOSIS — Z95.2 S/P TAVR (TRANSCATHETER AORTIC VALVE REPLACEMENT): Primary | ICD-10-CM

## 2023-10-31 PROCEDURE — 93000 ELECTROCARDIOGRAM COMPLETE: CPT | Performed by: INTERNAL MEDICINE

## 2023-10-31 PROCEDURE — 99214 OFFICE O/P EST MOD 30 MIN: CPT | Performed by: INTERNAL MEDICINE

## 2023-10-31 RX ORDER — LOSARTAN POTASSIUM 25 MG/1
25 TABLET ORAL DAILY
COMMUNITY

## 2023-10-31 NOTE — PROGRESS NOTES
Cardiology Follow Up    [facility]  1945  [id number]  [address] [place] [address] [facility] Naeem Haysgio Baez  [address] [facility] [address] [phone]    1. S/P TAVR (transcatheter aortic valve replacement)  POCT ECG      2. Primary hypertension            Interval History: Followup HTN and TAVR    Doing well. No chest pain, dyspnea or palpitations. Medical Problems       Problem List       Cataract    Hyperlipidemia    Umbilical hernia without obstruction and without gangrene    Prediabetes    Severe aortic stenosis    Rectus diastasis    Obesity (BMI 30.0-34. 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    CAD (coronary artery disease)     Cataract     CHF (congestive heart failure) ([address])     Encounter for pre-operative laboratory testing 2/15/2022    Glaucoma     Hyperchloremia 2/22/2022    Hyperlipidemia     Moderate aortic insufficiency     Prediabetes     Severe aortic stenosis     Sinus bradycardia     Umbilical hernia      Social History     Socioeconomic History    Marital status: /Civil Union     Spouse name: Not on file    Number of children: Not on file    Years of education: Not on file    Highest education level: Not on file   Occupational History    Not on file   Tobacco Use    Smoking status: Never    Smokeless tobacco: Never   Vaping Use    Vaping Use: Never used   Substance and Sexual Activity    Alcohol use:  Yes     Alcohol/week: 2.0 standard drinks of alcohol     Types: 2 Cans of beer per week     Comment: twice a wk Drug use: No    Sexual activity: Not on file   Other Topics Concern    Not on file   Social History Narrative    Not on file     Social Determinants of Health     Financial Resource Strain: Low Risk  (1/9/2023)    Overall Financial Resource Strain (CARDIA)     Difficulty of Paying Living Expenses: Not hard at all   Food Insecurity: Not on file   Transportation Needs: No Transportation Needs (1/9/2023)    PRAPARE - Transportation     Lack of Transportation (Medical): No     Lack of Transportation (Non-Medical): No   Physical Activity: Not on file   Stress: Not on file   Social Connections: Not on file   Intimate Partner Violence: Not on file   Housing Stability: Not on file      Family History   Problem Relation Age of Onset    Colon cancer Mother     Other Father         heart problem    Parkinsonism Family      Past Surgical History:   Procedure Laterality Date    ARTHROSCOPY KNEE Right     BUNIONECTOMY Right     CARDIAC CATHETERIZATION Bilateral 2/14/2022    Procedure: Cardiac catheterization;  Surgeon: Rashi Andrade MD;  Location: [facility]; Service: Cardiology    CARDIAC CATHETERIZATION N/A 2/22/2022    Procedure: CARDIAC TAVR;  Surgeon: Rubi Walls MD;  Location: [facility];  Service: Cardiology    HEMORRHOID SURGERY      MD ECHO TRANSESOPHAG R-T 2D W/PRB IMG ACQUISJ I&R N/A 2/22/2022    Procedure: TRANSESOPHAGEAL ECHOCARDIOGRAM (ELISSA); Surgeon: Charity Arteaga MD;  Location: [facility];  Service: Cardiac Surgery    MD REPLACE AORTIC VALVE OPENFEMORAL ARTERY APPROACH N/A 2/22/2022    Procedure: REPLACEMENT AORTIC VALVE TRANSCATHETER (TAVR) TRANSFEMORAL W/ 29MM SARKAR PAULO S3 ULTRA VALVE(ACCESS ON LEFT);   Surgeon: Charity Arteaga MD;  Location: [facility];  Service: Cardiac Surgery    SHOULDER SURGERY  2012    TONSILLECTOMY      childhood       Current Outpatient Medications:     aspirin 81 mg chewable tablet, Chew 1 tablet (81 mg total) daily, Disp: 30 tablet, Rfl: 2 dorzolamide-timolol (COSOPT) 22.3-6.8 MG/ML ophthalmic solution, INSTILL 1 DROP INTO BOTH EYES TWICE A DAY, Disp: , Rfl:     latanoprost (XALATAN) 0.005 % ophthalmic solution, INSTILL 1 DROP INTO EACH EYE ONCE DAILY AT NIGHT, Disp: , Rfl:     losartan (COZAAR) 25 mg tablet, Take 25 mg by mouth daily, Disp: , Rfl:     metoprolol tartrate (LOPRESSOR) 25 mg tablet, TAKE 1/2 TABLET BY MOUTH EVERY 12 HOURS, Disp: 90 tablet, Rfl: 3    simvastatin (ZOCOR) 10 mg tablet, TAKE 1 TABLET BY MOUTH EVERY DAY, Disp: 90 tablet, Rfl: 0  No Known Allergies    Labs:     Chemistry        Component Value Date/Time     11/20/2014 0905    K 4.2 08/21/2023 0829    K 4.4 11/20/2014 0905     (H) 08/21/2023 0829     11/20/2014 0905    CO2 25 08/21/2023 0829    CO2 24 02/22/2022 1039    BUN 26 (H) 08/21/2023 0829    BUN 17 11/20/2014 0905    CREATININE 1.15 08/21/2023 0829    CREATININE 0.77 11/20/2014 0905        Component Value Date/Time    CALCIUM 9.1 08/21/2023 0829    CALCIUM 8.8 11/20/2014 0905    ALKPHOS 53 08/21/2023 0829    ALKPHOS 71 11/20/2014 0905    AST 19 08/21/2023 0829    AST 27 11/20/2014 0905    ALT 31 08/21/2023 0829    ALT 46 11/20/2014 0905    BILITOT 0.5 11/20/2014 0905            Lab Results   Component Value Date    CHOL 246 11/20/2014     Lab Results   Component Value Date    HDL 54 08/21/2023    HDL 58 10/31/2022    HDL 65 10/27/2021     Lab Results   Component Value Date    LDLCALC 83 08/21/2023    LDLCALC 105 (H) 10/31/2022    LDLCALC 100 10/27/2021     Lab Results   Component Value Date    TRIG 105 08/21/2023    TRIG 89 10/31/2022    TRIG 102 10/27/2021     No results found for: "CHOLHDL"    Imaging: No results found. EKG: NSR Normal ECG. Review of Systems   Constitutional: Negative. HENT: Negative. Eyes: Negative. Cardiovascular: Negative. Respiratory: Negative. Endocrine: Negative. Hematologic/Lymphatic: Negative. Skin: Negative. Musculoskeletal: Negative. Gastrointestinal: Negative. Genitourinary: Negative. Neurological: Negative. Psychiatric/Behavioral: Negative. Allergic/Immunologic: Negative. Vitals:    10/31/23 0955   BP: 128/78   Pulse: 67           Physical Exam  Vitals reviewed. Constitutional:       Appearance: Normal appearance. HENT:      Head: Normocephalic. Nose: Nose normal.      Mouth/Throat:      Mouth: Mucous membranes are moist.      Pharynx: Oropharynx is clear. Eyes:      General: No scleral icterus. Conjunctiva/sclera: Conjunctivae normal.   Cardiovascular:      Rate and Rhythm: Normal rate and regular rhythm. Heart sounds: No murmur heard. No friction rub. No gallop. Pulmonary:      Effort: Pulmonary effort is normal. No respiratory distress. Breath sounds: Normal breath sounds. No wheezing or rales. Abdominal:      General: Abdomen is flat. Bowel sounds are normal. There is no distension. Palpations: Abdomen is soft. Tenderness: There is no abdominal tenderness. There is no guarding. Musculoskeletal:      Cervical back: Normal range of motion and neck supple. Right lower leg: No edema. Left lower leg: No edema. Skin:     General: Skin is warm and dry. Neurological:      General: No focal deficit present. Mental Status: He is alert and oriented to person, place, and time. Psychiatric:         Mood and Affect: Mood normal.         Behavior: Behavior normal.         Discussion/Summary:    Severe Aortic Stenosis SP  TAVR: Doing very well. Normal function on last echocardiogram.     HTN: Continue with losartan. BP is currently controlled. The patient was counseled regarding diagnostic results, instructions for management, risk factor reductions, impressions. total time of encounter was 25 minutes and 15 minutes was spent counseling.

## 2023-11-10 DIAGNOSIS — I10 BENIGN ESSENTIAL HTN: Primary | ICD-10-CM

## 2023-11-10 RX ORDER — LOSARTAN POTASSIUM 25 MG/1
25 TABLET ORAL DAILY
Qty: 90 TABLET | Refills: 1 | Status: SHIPPED | OUTPATIENT
Start: 2023-11-10 | End: 2024-05-08

## 2024-02-05 DIAGNOSIS — E78.5 HYPERLIPIDEMIA, UNSPECIFIED HYPERLIPIDEMIA TYPE: ICD-10-CM

## 2024-02-05 RX ORDER — SIMVASTATIN 10 MG
TABLET ORAL
Qty: 90 TABLET | Refills: 0 | Status: SHIPPED | OUTPATIENT
Start: 2024-02-05

## 2024-02-21 PROBLEM — S61.411A LACERATION OF RIGHT PALM: Status: RESOLVED | Noted: 2020-11-07 | Resolved: 2024-02-21

## 2024-02-28 ENCOUNTER — RA CDI HCC (OUTPATIENT)
Dept: OTHER | Facility: HOSPITAL | Age: 79
End: 2024-02-28

## 2024-03-06 ENCOUNTER — OFFICE VISIT (OUTPATIENT)
Dept: FAMILY MEDICINE CLINIC | Facility: HOSPITAL | Age: 79
End: 2024-03-06
Payer: MEDICARE

## 2024-03-06 VITALS
SYSTOLIC BLOOD PRESSURE: 130 MMHG | TEMPERATURE: 97.1 F | WEIGHT: 212 LBS | BODY MASS INDEX: 31.4 KG/M2 | HEART RATE: 67 BPM | OXYGEN SATURATION: 97 % | HEIGHT: 69 IN | DIASTOLIC BLOOD PRESSURE: 70 MMHG

## 2024-03-06 DIAGNOSIS — Z95.2 S/P TAVR (TRANSCATHETER AORTIC VALVE REPLACEMENT): ICD-10-CM

## 2024-03-06 DIAGNOSIS — I25.10 CORONARY ARTERY DISEASE INVOLVING NATIVE HEART WITHOUT ANGINA PECTORIS, UNSPECIFIED VESSEL OR LESION TYPE: ICD-10-CM

## 2024-03-06 DIAGNOSIS — Z12.5 SCREENING PSA (PROSTATE SPECIFIC ANTIGEN): ICD-10-CM

## 2024-03-06 DIAGNOSIS — I50.9 CONGESTIVE HEART FAILURE, UNSPECIFIED HF CHRONICITY, UNSPECIFIED HEART FAILURE TYPE (HCC): ICD-10-CM

## 2024-03-06 DIAGNOSIS — I10 BENIGN ESSENTIAL HTN: ICD-10-CM

## 2024-03-06 DIAGNOSIS — E78.5 HYPERLIPIDEMIA, UNSPECIFIED HYPERLIPIDEMIA TYPE: Primary | ICD-10-CM

## 2024-03-06 DIAGNOSIS — R73.03 PREDIABETES: ICD-10-CM

## 2024-03-06 PROCEDURE — 99214 OFFICE O/P EST MOD 30 MIN: CPT | Performed by: FAMILY MEDICINE

## 2024-03-06 PROCEDURE — G0439 PPPS, SUBSEQ VISIT: HCPCS | Performed by: FAMILY MEDICINE

## 2024-03-06 NOTE — ASSESSMENT & PLAN NOTE
Wt Readings from Last 3 Encounters:   03/06/24 96.2 kg (212 lb)   10/31/23 94 kg (207 lb 3.2 oz)   09/06/23 91.4 kg (201 lb 6.4 oz)     Stable. Continue to monitor.   He is doing well. Euvolemic.   Fu with cardiology.

## 2024-03-06 NOTE — PATIENT INSTRUCTIONS
Medicare Preventive Visit Patient Instructions  Thank you for completing your Welcome to Medicare Visit or Medicare Annual Wellness Visit today. Your next wellness visit will be due in one year (3/7/2025).  The screening/preventive services that you may require over the next 5-10 years are detailed below. Some tests may not apply to you based off risk factors and/or age. Screening tests ordered at today's visit but not completed yet may show as past due. Also, please note that scanned in results may not display below.  Preventive Screenings:  Service Recommendations Previous Testing/Comments   Colorectal Cancer Screening  Colonoscopy    Fecal Occult Blood Test (FOBT)/Fecal Immunochemical Test (FIT)  Fecal DNA/Cologuard Test  Flexible Sigmoidoscopy Age: 45-75 years old   Colonoscopy: every 10 years (May be performed more frequently if at higher risk)  OR  FOBT/FIT: every 1 year  OR  Cologuard: every 3 years  OR  Sigmoidoscopy: every 5 years  Screening may be recommended earlier than age 45 if at higher risk for colorectal cancer. Also, an individualized decision between you and your healthcare provider will decide whether screening between the ages of 76-85 would be appropriate. Colonoscopy: 06/17/2021  FOBT/FIT: Not on file  Cologuard: Not on file  Sigmoidoscopy: Not on file          Prostate Cancer Screening Individualized decision between patient and health care provider in men between ages of 55-69   Medicare will cover every 12 months beginning on the day after your 50th birthday PSA: 1.0 ng/mL     Screening Not Indicated     Hepatitis C Screening Once for adults born between 1945 and 1965  More frequently in patients at high risk for Hepatitis C Hep C Antibody: 10/27/2021    Screening Current   Diabetes Screening 1-2 times per year if you're at risk for diabetes or have pre-diabetes Fasting glucose: 133 mg/dL (8/21/2023)  A1C: 5.7 % (8/21/2023)  Screening Current   Cholesterol Screening Once every 5 years if  you don't have a lipid disorder. May order more often based on risk factors. Lipid panel: 08/21/2023  Screening Not Indicated  History Lipid Disorder      Other Preventive Screenings Covered by Medicare:  Abdominal Aortic Aneurysm (AAA) Screening: covered once if your at risk. You're considered to be at risk if you have a family history of AAA or a male between the age of 65-75 who smoking at least 100 cigarettes in your lifetime.  Lung Cancer Screening: covers low dose CT scan once per year if you meet all of the following conditions: (1) Age 55-77; (2) No signs or symptoms of lung cancer; (3) Current smoker or have quit smoking within the last 15 years; (4) You have a tobacco smoking history of at least 20 pack years (packs per day x number of years you smoked); (5) You get a written order from a healthcare provider.  Glaucoma Screening: covered annually if you're considered high risk: (1) You have diabetes OR (2) Family history of glaucoma OR (3)  aged 50 and older OR (4)  American aged 65 and older  Osteoporosis Screening: covered every 2 years if you meet one of the following conditions: (1) Have a vertebral abnormality; (2) On glucocorticoid therapy for more than 3 months; (3) Have primary hyperparathyroidism; (4) On osteoporosis medications and need to assess response to drug therapy.  HIV Screening: covered annually if you're between the age of 15-65. Also covered annually if you are younger than 15 and older than 65 with risk factors for HIV infection. For pregnant patients, it is covered up to 3 times per pregnancy.    Immunizations:  Immunization Recommendations   Influenza Vaccine Annual influenza vaccination during flu season is recommended for all persons aged >= 6 months who do not have contraindications   Pneumococcal Vaccine   * Pneumococcal conjugate vaccine = PCV13 (Prevnar 13), PCV15 (Vaxneuvance), PCV20 (Prevnar 20)  * Pneumococcal polysaccharide vaccine = PPSV23  (Pneumovax) Adults 19-63 yo with certain risk factors or if 65+ yo  If never received any pneumonia vaccine: recommend Prevnar 20 (PCV20)  Give PCV20 if previously received 1 dose of PCV13 or PPSV23   Hepatitis B Vaccine 3 dose series if at intermediate or high risk (ex: diabetes, end stage renal disease, liver disease)   Respiratory syncytial virus (RSV) Vaccine - COVERED BY MEDICARE PART D  * RSVPreF3 (Arexvy) CDC recommends that adults 60 years of age and older may receive a single dose of RSV vaccine using shared clinical decision-making (SCDM)   Tetanus (Td) Vaccine - COST NOT COVERED BY MEDICARE PART B Following completion of primary series, a booster dose should be given every 10 years to maintain immunity against tetanus. Td may also be given as tetanus wound prophylaxis.   Tdap Vaccine - COST NOT COVERED BY MEDICARE PART B Recommended at least once for all adults. For pregnant patients, recommended with each pregnancy.   Shingles Vaccine (Shingrix) - COST NOT COVERED BY MEDICARE PART B  2 shot series recommended in those 19 years and older who have or will have weakened immune systems or those 50 years and older     Health Maintenance Due:      Topic Date Due   • Hepatitis C Screening  Completed   • Colorectal Cancer Screening  Discontinued     Immunizations Due:      Topic Date Due   • Influenza Vaccine (1) 09/01/2023   • COVID-19 Vaccine (6 - 2023-24 season) 01/24/2024     Advance Directives   What are advance directives?  Advance directives are legal documents that state your wishes and plans for medical care. These plans are made ahead of time in case you lose your ability to make decisions for yourself. Advance directives can apply to any medical decision, such as the treatments you want, and if you want to donate organs.   What are the types of advance directives?  There are many types of advance directives, and each state has rules about how to use them. You may choose a combination of any of the  following:  Living will:  This is a written record of the treatment you want. You can also choose which treatments you do not want, which to limit, and which to stop at a certain time. This includes surgery, medicine, IV fluid, and tube feedings.   Durable power of  for healthcare (DPAHC):  This is a written record that states who you want to make healthcare choices for you when you are unable to make them for yourself. This person, called a proxy, is usually a family member or a friend. You may choose more than 1 proxy.  Do not resuscitate (DNR) order:  A DNR order is used in case your heart stops beating or you stop breathing. It is a request not to have certain forms of treatment, such as CPR. A DNR order may be included in other types of advance directives.  Medical directive:  This covers the care that you want if you are in a coma, near death, or unable to make decisions for yourself. You can list the treatments you want for each condition. Treatment may include pain medicine, surgery, blood transfusions, dialysis, IV or tube feedings, and a ventilator (breathing machine).  Values history:  This document has questions about your views, beliefs, and how you feel and think about life. This information can help others choose the care that you would choose.  Why are advance directives important?  An advance directive helps you control your care. Although spoken wishes may be used, it is better to have your wishes written down. Spoken wishes can be misunderstood, or not followed. Treatments may be given even if you do not want them. An advance directive may make it easier for your family to make difficult choices about your care.   Weight Management   Why it is important to manage your weight:  Being overweight increases your risk of health conditions such as heart disease, high blood pressure, type 2 diabetes, and certain types of cancer. It can also increase your risk for osteoarthritis, sleep apnea, and  other respiratory problems. Aim for a slow, steady weight loss. Even a small amount of weight loss can lower your risk of health problems.  How to lose weight safely:  A safe and healthy way to lose weight is to eat fewer calories and get regular exercise. You can lose up about 1 pound a week by decreasing the number of calories you eat by 500 calories each day.   Healthy meal plan for weight management:  A healthy meal plan includes a variety of foods, contains fewer calories, and helps you stay healthy. A healthy meal plan includes the following:  Eat whole-grain foods more often.  A healthy meal plan should contain fiber. Fiber is the part of grains, fruits, and vegetables that is not broken down by your body. Whole-grain foods are healthy and provide extra fiber in your diet. Some examples of whole-grain foods are whole-wheat breads and pastas, oatmeal, brown rice, and bulgur.  Eat a variety of vegetables every day.  Include dark, leafy greens such as spinach, kale, pj greens, and mustard greens. Eat yellow and orange vegetables such as carrots, sweet potatoes, and winter squash.   Eat a variety of fruits every day.  Choose fresh or canned fruit (canned in its own juice or light syrup) instead of juice. Fruit juice has very little or no fiber.  Eat low-fat dairy foods.  Drink fat-free (skim) milk or 1% milk. Eat fat-free yogurt and low-fat cottage cheese. Try low-fat cheeses such as mozzarella and other reduced-fat cheeses.  Choose meat and other protein foods that are low in fat.  Choose beans or other legumes such as split peas or lentils. Choose fish, skinless poultry (chicken or turkey), or lean cuts of red meat (beef or pork). Before you cook meat or poultry, cut off any visible fat.   Use less fat and oil.  Try baking foods instead of frying them. Add less fat, such as margarine, sour cream, regular salad dressing and mayonnaise to foods. Eat fewer high-fat foods. Some examples of high-fat foods  include french fries, doughnuts, ice cream, and cakes.  Eat fewer sweets.  Limit foods and drinks that are high in sugar. This includes candy, cookies, regular soda, and sweetened drinks.  Exercise:  Exercise at least 30 minutes per day on most days of the week. Some examples of exercise include walking, biking, dancing, and swimming. You can also fit in more physical activity by taking the stairs instead of the elevator or parking farther away from stores. Ask your healthcare provider about the best exercise plan for you.      © Copyright [employer] 2018 Information is for End User's use only and may not be sold, redistributed or otherwise used for commercial purposes. All illustrations and images included in CareNotes® are the copyrighted property of [employer] or [employer]

## 2024-03-06 NOTE — PROGRESS NOTES
Assessment and Plan:     Problem List Items Addressed This Visit        Cardiovascular and Mediastinum    Benign essential HTN     Stable. Doing well. Cotinue with losartan and metoprolol         Relevant Orders    CBC    Comprehensive metabolic panel    Lipid Panel with Direct LDL reflex    TSH, 3rd generation with Free T4 reflex    CAD (coronary artery disease)     No current sytmpoms.   Fu with Cardiology.          CHF (congestive heart failure) ([facility])     Wt Readings from Last 3 Encounters:   03/06/24 96.2 kg (212 lb)   10/31/23 94 kg (207 lb 3.2 oz)   09/06/23 91.4 kg (201 lb 6.4 oz)     Stable. Continue to monitor.   He is doing well. Euvolemic.   Fu with cardiology.              Relevant Orders    CBC    Comprehensive metabolic panel    Lipid Panel with Direct LDL reflex    TSH, 3rd generation with Free T4 reflex       Other    Hyperlipidemia - Primary    Prediabetes    S/P TAVR (transcatheter aortic valve replacement)   Other Visit Diagnoses     Screening PSA (prostate specific antigen)        Relevant Orders    PSA, Total Screen           Preventive health issues were discussed with patient, and age appropriate screening tests were ordered as noted in patient's After Visit Summary.  Personalized health advice and appropriate referrals for health education or preventive services given if needed, as noted in patient's After Visit Summary.     History of Present Illness:     Patient presents for a Medicare Wellness Visit    Bill is here for fu of chronic conditions and awv.   No new concerns. Overall feeling well. On occasion he does have some dizziness. No association with Bp readings.   May happen with sudden movements of the head. No falls. No numbness, tingling, weakness.        Patient Care Team:  Chet Delarosa MD as PCP - General     Review of Systems:     Review of Systems   Constitutional: Negative.  Negative for activity change, appetite change, chills and diaphoresis.   HENT:  Negative for  congestion and dental problem.    Respiratory: Negative.  Negative for apnea, chest tightness, shortness of breath and wheezing.    Cardiovascular: Negative.  Negative for chest pain, palpitations and leg swelling.   Gastrointestinal: Negative.  Negative for abdominal distention, abdominal pain, constipation, diarrhea and nausea.   Genitourinary: Negative.  Negative for difficulty urinating, dysuria and frequency.   All other systems reviewed and are negative.       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   • CAD (coronary artery disease)    • Cataract    • CHF (congestive heart failure) ([facility])    • Encounter for pre-operative laboratory testing 2/15/2022   • Glaucoma    • Hyperchloremia 2/22/2022   • Hyperlipidemia    • Moderate aortic insufficiency    • Prediabetes    • Severe aortic stenosis    • Sinus bradycardia    • Umbilical hernia      Past Surgical History:   Procedure Laterality Date   • ARTHROSCOPY KNEE Right    • BUNIONECTOMY Right    • CARDIAC CATHETERIZATION Bilateral 2/14/2022    Procedure: Cardiac catheterization;  Surgeon: Mohan Perez MD;  [facility];  Service: Cardiology   • CARDIAC CATHETERIZATION N/A 2/22/2022    Procedure: CARDIAC TAVR;  Surgeon: Randall Webb MD;  [facility];  Service: Cardiology   • HEMORRHOID SURGERY     • OR ECHO TRANSESOPHAG R-T 2D W/PRB IMG ACQUISJ I&R N/A 2/22/2022    Procedure: TRANSESOPHAGEAL ECHOCARDIOGRAM (ELISSA);  Surgeon: CHRISTOPHER Pereira  MD Caren;  [facility];  Service: Cardiac Surgery   • [place] REPLACE AORTIC VALVE OPENFEMORAL ARTERY APPROACH N/A 2/22/2022    Procedure: REPLACEMENT AORTIC VALVE TRANSCATHETER (TAVR) TRANSFEMORAL W/ 29MM SARKAR PAULO S3 ULTRA VALVE(ACCESS ON LEFT);  Surgeon: CHRISTOPHER Fabian MD;  [facility];  Service: Cardiac Surgery   • SHOULDER SURGERY  2012   • TONSILLECTOMY      childhood      Family History:     Family History   Problem Relation Age of Onset   • Colon cancer Mother    • Other Father         heart problem   • Parkinsonism Family       Social History:     Social History     Socioeconomic History   • Marital status: /Civil Union     Spouse name: None   • Number of children: None   • Years of education: None   • Highest education level: None   Occupational History   • None   Tobacco Use   • Smoking status: Never   • Smokeless tobacco: Never   Vaping Use   • Vaping status: Never Used   Substance and Sexual Activity   • Alcohol use: Yes     Alcohol/week: 2.0 standard drinks of alcohol     Types: 2 Cans of beer per week     Comment: twice a wk    • Drug use: No   • Sexual activity: None   Other Topics Concern   • None   Social History Narrative   • None     Social Determinants of Health     Financial Resource Strain: Low Risk  (3/6/2024)    Overall Financial Resource Strain (CARDIA)    • Difficulty of Paying Living Expenses: Not hard at all   Food Insecurity: Not on file   Transportation Needs: No Transportation Needs (3/6/2024)    PRAPARE - Transportation    • Lack of Transportation (Medical): No    • Lack of Transportation (Non-Medical): No   Physical Activity: Not on file   Stress: Not on file   Social Connections: Not on file   Intimate Partner Violence: Not on file   Housing Stability: Not on file      Medications and Allergies:     Current Outpatient Medications   Medication Sig Dispense Refill   • aspirin 81 mg chewable tablet Chew 1 tablet (81 mg total) daily 30 tablet 2    • dorzolamide-timolol (COSOPT) 22.3-6.8 MG/ML ophthalmic solution INSTILL 1 DROP INTO BOTH EYES TWICE A DAY     • latanoprost (XALATAN) 0.005 % ophthalmic solution INSTILL 1 DROP INTO EACH EYE ONCE DAILY AT NIGHT     • losartan (COZAAR) 25 mg tablet Take 1 tablet (25 mg total) by mouth daily 90 tablet 1   • metoprolol tartrate (LOPRESSOR) 25 mg tablet TAKE 1/2 TABLET BY MOUTH EVERY 12 HOURS 90 tablet 3   • simvastatin (ZOCOR) 10 mg tablet TAKE 1 TABLET BY MOUTH EVERY DAY 90 tablet 0     No current facility-administered medications for this visit.     No Known Allergies   Immunizations:     Immunization History   Administered Date(s) Administered   • COVID-19 Moderna mRNA Vaccine 12 Yr+ 50 mcg/0.5 mL (Spikevax) 11/29/2023   • COVID-19 PFIZER VACCINE 0.3 ML IM 03/09/2021, 03/30/2021, 11/08/2021   • COVID-19 Pfizer Vac BIVALENT Bassam-sucrose 12 Yr+ IM 10/26/2022   • INFLUENZA 11/16/2005, 10/11/2021, 12/05/2022   • Influenza Split High Dose Preservative Free IM 10/22/2014   • Influenza, high dose seasonal 0.7 mL 10/14/2020   • Influenza, seasonal, injectable 09/23/2017   • Pneumococcal Conjugate 13-Valent 03/05/2018   • Pneumococcal Polysaccharide PPV23 11/06/2012, 04/05/2019   • Tdap 11/06/2008, 06/30/2022   • Zoster Vaccine Recombinant 04/05/2019, 08/06/2019      Health Maintenance:         Topic Date Due   • Hepatitis C Screening  Completed   • Colorectal Cancer Screening  Discontinued         Topic Date Due   • Influenza Vaccine (1) 09/01/2023   • COVID-19 Vaccine (6 - 2023-24 season) 01/24/2024      Medicare Screening Tests and Risk Assessments:     Akhil is here for his Subsequent Wellness visit.     Health Risk Assessment:   Patient rates overall health as good. Patient feels that their physical health rating is same. Patient is very satisfied with their life. Eyesight was rated as same. Hearing was rated as same. Patient feels that their emotional and mental health rating is same. Patients states they are  never, rarely angry. Patient states they are never, rarely unusually tired/fatigued. Pain experienced in the last 7 days has been none. Patient states that he has experienced no weight loss or gain in last 6 months.     Depression Screening:   PHQ-2 Score: 0      Fall Risk Screening:   In the past year, patient has experienced: no history of falling in past year      Home Safety:  Patient does not have trouble with stairs inside or outside of their home. Patient has working smoke alarms and has no working carbon monoxide detector. Home safety hazards include: none.     Nutrition:   Current diet is Regular.     Medications:   Patient is not currently taking any over-the-counter supplements. Patient is able to manage medications.     Activities of Daily Living (ADLs)/Instrumental Activities of Daily Living (IADLs):   Walk and transfer into and out of bed and chair?: Yes  Dress and groom yourself?: Yes    Bathe or shower yourself?: Yes    Feed yourself? Yes  Do your laundry/housekeeping?: Yes  Manage your money, pay your bills and track your expenses?: Yes  Make your own meals?: Yes    Do your own shopping?: Yes    Previous Hospitalizations:   Any hospitalizations or ED visits within the last 12 months?: No      Advance Care Planning:   Living will: Yes    Durable POA for healthcare: Yes    Advanced directive: Yes      Cognitive Screening:   Provider or family/friend/caregiver concerned regarding cognition?: No    PREVENTIVE SCREENINGS      Cardiovascular Screening:    General: Screening Not Indicated and History Lipid Disorder      Diabetes Screening:     General: Screening Current      Colorectal Cancer Screening:     General: Screening Current      Prostate Cancer Screening:    General: Screening Not Indicated      Osteoporosis Screening:    General: Screening Not Indicated      Abdominal Aortic Aneurysm (AAA) Screening:        General: Screening Not Indicated      Lung Cancer Screening:     General: Screening Not  "Indicated      Hepatitis C Screening:    General: Screening Current    Screening, Brief Intervention, and Referral to Treatment (SBIRT)    Screening      Single Item Drug Screening:  How often have you used an illegal drug (including marijuana) or a prescription medication for non-medical reasons in the past year? never    Single Item Drug Screen Score: 0  Interpretation: Negative screen for possible drug use disorder    Vision Screening    Right eye Left eye Both eyes   Without correction      With correction 20/25 20/20 20/20        Physical Exam:     /70   Pulse 67   Temp (!) 97.1 °F (36.2 °C)   Ht 5' 9\" (1.753 m)   Wt 96.2 kg (212 lb)   SpO2 97%   BMI 31.31 kg/m²     Physical Exam  Vitals and nursing note reviewed.   Constitutional:       General: He is not in acute distress.     Appearance: He is well-developed.   HENT:      Head: Normocephalic and atraumatic.      Right Ear: Tympanic membrane, ear canal and external ear normal.      Left Ear: Tympanic membrane, ear canal and external ear normal.      Nose: Nose normal.   Eyes:      Extraocular Movements: Extraocular movements intact.      Conjunctiva/sclera: Conjunctivae normal.      Pupils: Pupils are equal, round, and reactive to light.   Cardiovascular:      Rate and Rhythm: Normal rate and regular rhythm.      Heart sounds: No murmur heard.  Pulmonary:      Effort: Pulmonary effort is normal. No respiratory distress.      Breath sounds: Normal breath sounds.   Abdominal:      Palpations: Abdomen is soft.      Tenderness: There is no abdominal tenderness.   Musculoskeletal:         General: No swelling.      Cervical back: Neck supple.   Skin:     General: Skin is warm and dry.      Capillary Refill: Capillary refill takes less than 2 seconds.   Neurological:      General: No focal deficit present.      Mental Status: He is alert and oriented to person, place, and time.   Psychiatric:         Mood and Affect: Mood normal.         Behavior: " Behavior normal.          Chet Delarosa MD

## 2024-03-08 ENCOUNTER — APPOINTMENT (OUTPATIENT)
Dept: LAB | Facility: HOSPITAL | Age: 79
End: 2024-03-08
Payer: MEDICARE

## 2024-03-08 DIAGNOSIS — Z12.5 SCREENING PSA (PROSTATE SPECIFIC ANTIGEN): ICD-10-CM

## 2024-03-08 DIAGNOSIS — I10 BENIGN ESSENTIAL HTN: ICD-10-CM

## 2024-03-08 DIAGNOSIS — I50.9 CONGESTIVE HEART FAILURE, UNSPECIFIED HF CHRONICITY, UNSPECIFIED HEART FAILURE TYPE (HCC): ICD-10-CM

## 2024-03-08 LAB
ALBUMIN SERPL BCP-MCNC: 4 G/DL (ref 3.5–5)
ALP SERPL-CCNC: 50 U/L (ref 34–104)
ALT SERPL W P-5'-P-CCNC: 21 U/L (ref 7–52)
ANION GAP SERPL CALCULATED.3IONS-SCNC: 7 MMOL/L
AST SERPL W P-5'-P-CCNC: 21 U/L (ref 13–39)
BILIRUB SERPL-MCNC: 0.58 MG/DL (ref 0.2–1)
BUN SERPL-MCNC: 22 MG/DL (ref 5–25)
CALCIUM SERPL-MCNC: 8.7 MG/DL (ref 8.4–10.2)
CHLORIDE SERPL-SCNC: 108 MMOL/L (ref 96–108)
CHOLEST SERPL-MCNC: 150 MG/DL
CO2 SERPL-SCNC: 25 MMOL/L (ref 21–32)
CREAT SERPL-MCNC: 1.04 MG/DL (ref 0.6–1.3)
ERYTHROCYTE [DISTWIDTH] IN BLOOD BY AUTOMATED COUNT: 11.7 % (ref 11.6–15.1)
GFR SERPL CREATININE-BSD FRML MDRD: 68 ML/MIN/1.73SQ M
GLUCOSE P FAST SERPL-MCNC: 116 MG/DL (ref 65–99)
HCT VFR BLD AUTO: 41.5 % (ref 36.5–49.3)
HDLC SERPL-MCNC: 51 MG/DL
HGB BLD-MCNC: 13.3 G/DL (ref 12–17)
LDLC SERPL CALC-MCNC: 84 MG/DL (ref 0–100)
MCH RBC QN AUTO: 33.4 PG (ref 26.8–34.3)
MCHC RBC AUTO-ENTMCNC: 32 G/DL (ref 31.4–37.4)
MCV RBC AUTO: 104 FL (ref 82–98)
PLATELET # BLD AUTO: 183 THOUSANDS/UL (ref 149–390)
PMV BLD AUTO: 10.4 FL (ref 8.9–12.7)
POTASSIUM SERPL-SCNC: 4.5 MMOL/L (ref 3.5–5.3)
PROT SERPL-MCNC: 6.5 G/DL (ref 6.4–8.4)
PSA SERPL-MCNC: 0.8 NG/ML (ref 0–4)
RBC # BLD AUTO: 3.98 MILLION/UL (ref 3.88–5.62)
SODIUM SERPL-SCNC: 140 MMOL/L (ref 135–147)
TRIGL SERPL-MCNC: 75 MG/DL
TSH SERPL DL<=0.05 MIU/L-ACNC: 0.86 UIU/ML (ref 0.45–4.5)
WBC # BLD AUTO: 6.28 THOUSAND/UL (ref 4.31–10.16)

## 2024-03-08 PROCEDURE — 84443 ASSAY THYROID STIM HORMONE: CPT

## 2024-03-08 PROCEDURE — 36415 COLL VENOUS BLD VENIPUNCTURE: CPT

## 2024-03-08 PROCEDURE — 80061 LIPID PANEL: CPT

## 2024-03-08 PROCEDURE — 85027 COMPLETE CBC AUTOMATED: CPT

## 2024-03-08 PROCEDURE — 80053 COMPREHEN METABOLIC PANEL: CPT

## 2024-03-08 PROCEDURE — G0103 PSA SCREENING: HCPCS

## 2024-04-19 DIAGNOSIS — Z95.2 S/P TAVR (TRANSCATHETER AORTIC VALVE REPLACEMENT): ICD-10-CM

## 2024-05-02 DIAGNOSIS — E78.5 HYPERLIPIDEMIA, UNSPECIFIED HYPERLIPIDEMIA TYPE: ICD-10-CM

## 2024-05-02 RX ORDER — SIMVASTATIN 10 MG
TABLET ORAL
Qty: 90 TABLET | Refills: 0 | Status: SHIPPED | OUTPATIENT
Start: 2024-05-02

## 2024-05-04 DIAGNOSIS — I10 BENIGN ESSENTIAL HTN: ICD-10-CM

## 2024-05-05 RX ORDER — LOSARTAN POTASSIUM 25 MG/1
25 TABLET ORAL DAILY
Qty: 90 TABLET | Refills: 1 | Status: SHIPPED | OUTPATIENT
Start: 2024-05-05 | End: 2024-11-01

## 2024-08-01 DIAGNOSIS — E78.5 HYPERLIPIDEMIA, UNSPECIFIED HYPERLIPIDEMIA TYPE: ICD-10-CM

## 2024-08-01 RX ORDER — SIMVASTATIN 10 MG
TABLET ORAL
Qty: 90 TABLET | Refills: 1 | Status: SHIPPED | OUTPATIENT
Start: 2024-08-01

## 2024-09-28 DIAGNOSIS — Z95.2 S/P TAVR (TRANSCATHETER AORTIC VALVE REPLACEMENT): ICD-10-CM

## 2024-09-30 RX ORDER — METOPROLOL TARTRATE 25 MG/1
TABLET, FILM COATED ORAL
Qty: 90 TABLET | Refills: 0 | Status: SHIPPED | OUTPATIENT
Start: 2024-09-30

## 2024-10-27 DIAGNOSIS — I10 BENIGN ESSENTIAL HTN: ICD-10-CM

## 2024-10-28 RX ORDER — LOSARTAN POTASSIUM 25 MG/1
25 TABLET ORAL DAILY
Qty: 90 TABLET | Refills: 0 | Status: SHIPPED | OUTPATIENT
Start: 2024-10-28 | End: 2025-04-26

## 2024-12-26 DIAGNOSIS — Z95.2 S/P TAVR (TRANSCATHETER AORTIC VALVE REPLACEMENT): ICD-10-CM

## 2024-12-27 RX ORDER — METOPROLOL TARTRATE 25 MG/1
12.5 TABLET, FILM COATED ORAL EVERY 12 HOURS
Qty: 90 TABLET | Refills: 0 | Status: SHIPPED | OUTPATIENT
Start: 2024-12-27

## 2025-01-08 ENCOUNTER — OFFICE VISIT (OUTPATIENT)
Dept: CARDIOLOGY CLINIC | Facility: CLINIC | Age: 80
End: 2025-01-08
Payer: MEDICARE

## 2025-01-08 VITALS
DIASTOLIC BLOOD PRESSURE: 84 MMHG | SYSTOLIC BLOOD PRESSURE: 148 MMHG | WEIGHT: 216 LBS | HEIGHT: 69 IN | BODY MASS INDEX: 31.99 KG/M2 | HEART RATE: 63 BPM

## 2025-01-08 DIAGNOSIS — I10 PRIMARY HYPERTENSION: ICD-10-CM

## 2025-01-08 DIAGNOSIS — E78.2 MIXED HYPERLIPIDEMIA: ICD-10-CM

## 2025-01-08 DIAGNOSIS — Z95.2 S/P TAVR (TRANSCATHETER AORTIC VALVE REPLACEMENT): Primary | ICD-10-CM

## 2025-01-08 PROCEDURE — 93000 ELECTROCARDIOGRAM COMPLETE: CPT

## 2025-01-08 PROCEDURE — 99214 OFFICE O/P EST MOD 30 MIN: CPT

## 2025-01-08 NOTE — PROGRESS NOTES
Cardiology Follow Up    Akhil Gómez  1945  [id number]  [facility]  [address]  [facility] [place]  [facility] [id number] [phone]    1. S/P TAVR (transcatheter aortic valve replacement)  POCT ECG      2. Primary hypertension        3. Mixed hyperlipidemia           Discussion/Summary:  1 TAVR.): Patient with history of 29mm Pan transcatheter replacement on 2/23/2022.  Last echocardiogram on 3/4/2023 showed normally functioning valve, EF 55%.     Patient with complaints of episodes of lightheadedness, dizziness occurring with ambulation lasting less than a minute, patient states he has noticed these episodes occurring over the last 3 months and have only occurred a handful of times.      Patient exercises at the gym regularly, states he does 30 minutes on stationary bike, then lifts weights with machines.  He has not had any of these episodes with exercise.  Patient also notes in the last 3 months some wheezing with exercise. Patient denies chest pain, chest pressure, or any other symptoms at this time.  Cardiac cath from 2022, showed mild coronary artery disease.    He has recently gained weight secondary to dietary indiscretions over the holidays, he has no lower extremity edema.    At this time we will check an echocardiogram to assess valve functioning, and overall heart function.  If echocardiogram is normal and if these episodes continue or increased in frequency may consider an extended ambulatory monitor, or loop monitor.    2.)  Hypertension: Patient maintained on Cozaar 25 mg daily, metoprolol succinate 25 mg daily.  His blood pressure slightly elevated in the office today 148/84.  He takes his blood pressure at home and notes that it is typically 130s over 70s.  I recommended patient continue to take his blood pressure a few times a week and let the office know if it starts to be above goal of less than  140/80.  No changes at this time.    3.) Hyperlipidemia: Lipid profile from 3/8/2024: With total cholesterol 150, LDL 84.  Patient maintained on simvastatin 10 mg daily.  Will recheck cholesterol panel in the next few months.    Patient will follow-up in 3 months or sooner if necessary.    Interval History:   Akhil Gómez is a 79 y.o. male with a past medical history of coronary artery disease, congestive heart failure, hyperlipidemia TAVR in 2022 here for routine follow-up.    Patient is a patient of Dr. Druand, he is overdue for his yearly visit.  Since his last visit he has been noticing in the last 3 months that he has had rare episodes of lightheadedness and dizziness with walking.  Resolves within a minute and is not associated with any other symptoms.  He does state that over the last 3 months he also noticed some occasional wheezing.  He exercises regularly at the gym and has not experienced any of these episodes while exercising.    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    CAD (coronary artery disease)     Cataract     CHF (congestive heart failure) ([facility])     Encounter for pre-operative laboratory testing 2/15/2022    Glaucoma     Hyperchloremia 2/22/2022    Hyperlipidemia     Moderate aortic insufficiency     Prediabetes     Severe aortic stenosis     Sinus bradycardia     Umbilical hernia      Social History     Socioeconomic History    Marital status: /Civil Union     Spouse name: Not on file    Number of children: Not on file    Years of education: Not  on file    Highest education level: Not on file   Occupational History    Not on file   Tobacco Use    Smoking status: Never    Smokeless tobacco: Never   Vaping Use    Vaping status: Never Used   Substance and Sexual Activity    Alcohol use: Yes     Alcohol/week: 2.0 standard drinks of alcohol     Types: 2 Cans of beer per week     Comment: twice a wk     Drug use: No    Sexual activity: Not on file   Other Topics Concern    Not on file   Social History Narrative    Not on file     Social Drivers of Health     Financial Resource Strain: Low Risk  (3/6/2024)    Overall Financial Resource Strain (CARDIA)     Difficulty of Paying Living Expenses: Not hard at all   Food Insecurity: Not on file   Transportation Needs: No Transportation Needs (3/6/2024)    PRAPARE - Transportation     Lack of Transportation (Medical): No     Lack of Transportation (Non-Medical): No   Physical Activity: Not on file   Stress: Not on file   Social Connections: Not on file   Intimate Partner Violence: Not on file   Housing Stability: Not on file      Family History   Problem Relation Age of Onset    Colon cancer Mother     Other Father         heart problem    Parkinsonism Family      Past Surgical History:   Procedure Laterality Date    ARTHROSCOPY KNEE Right     BUNIONECTOMY Right     CARDIAC CATHETERIZATION Bilateral 2/14/2022    Procedure: Cardiac catheterization;  Surgeon: Mohan Perez MD;  [facility];  Service: Cardiology    CARDIAC CATHETERIZATION N/A 2/22/2022    Procedure: CARDIAC TAVR;  Surgeon: Randall Webb MD;  [facility];  Service: Cardiology    HEMORRHOID SURGERY      [place] ECHO TRANSESOPHAG R-T 2D W/PRB IMG ACQUISJ I&R N/A 2/22/2022    Procedure: TRANSESOPHAGEAL ECHOCARDIOGRAM (ELISSA);  Surgeon: CHRISTOPHER Fabian MD;  [facility];  Service: Cardiac Surgery    [place] REPLACE AORTIC VALVE OPENFEMORAL ARTERY APPROACH N/A 2/22/2022    Procedure: REPLACEMENT AORTIC VALVE TRANSCATHETER (TAVR)  TRANSFEMORAL W/ 29MM SARKAR PAULO S3 ULTRA VALVE(ACCESS ON LEFT);  Surgeon: CHRISTOPHER Fabian MD;  [facility];  Service: Cardiac Surgery    SHOULDER SURGERY  2012    TONSILLECTOMY      childhood       Current Outpatient Medications:     aspirin 81 mg chewable tablet, Chew 1 tablet (81 mg total) daily, Disp: 30 tablet, Rfl: 2    dorzolamide-timolol (COSOPT) 22.3-6.8 MG/ML ophthalmic solution, INSTILL 1 DROP INTO BOTH EYES TWICE A DAY, Disp: , Rfl:     latanoprost (XALATAN) 0.005 % ophthalmic solution, INSTILL 1 DROP INTO EACH EYE ONCE DAILY AT NIGHT, Disp: , Rfl:     losartan (COZAAR) 25 mg tablet, TAKE 1 TABLET (25 MG TOTAL) BY MOUTH DAILY., Disp: 90 tablet, Rfl: 0    metoprolol tartrate (LOPRESSOR) 25 mg tablet, TAKE 1/2 TABLET BY MOUTH EVERY 12 HOURS, Disp: 90 tablet, Rfl: 0    simvastatin (ZOCOR) 10 mg tablet, TAKE 1 TABLET BY MOUTH EVERY DAY, Disp: 90 tablet, Rfl: 1  No Known Allergies    Labs:     Chemistry        Component Value Date/Time     11/20/2014 0905    K 4.5 03/08/2024 0803    K 4.4 11/20/2014 0905     03/08/2024 0803     11/20/2014 0905    CO2 25 03/08/2024 0803    CO2 24 02/22/2022 1039    BUN 22 03/08/2024 0803    BUN 17 11/20/2014 0905    CREATININE 1.04 03/08/2024 0803    CREATININE 0.77 11/20/2014 0905        Component Value Date/Time    CALCIUM 8.7 03/08/2024 0803    CALCIUM 8.8 11/20/2014 0905    ALKPHOS 50 03/08/2024 0803    ALKPHOS 71 11/20/2014 0905    AST 21 03/08/2024 0803    AST 27 11/20/2014 0905    ALT 21 03/08/2024 0803    ALT 46 11/20/2014 0905    BILITOT 0.5 11/20/2014 0905            Lab Results   Component Value Date    CHOL 246 11/20/2014     Lab Results   Component Value Date    HDL 51 03/08/2024    HDL 54 08/21/2023    HDL 58 10/31/2022     Lab Results   Component Value Date    LDLCALC 84 03/08/2024    LDLCALC 83 08/21/2023    LDLCALC 105 (H) 10/31/2022     Lab Results   Component Value Date    TRIG 75 03/08/2024    TRIG 105 08/21/2023    TRIG  "89 10/31/2022     Imaging: No results found.    ECG: Normal sinus rhythm, pulse 63    Review of Systems   Constitutional: Positive for weight gain.   Cardiovascular:  Negative for chest pain, dyspnea on exertion, irregular heartbeat, leg swelling, near-syncope, palpitations and paroxysmal nocturnal dyspnea.   Respiratory:  Positive for wheezing. Negative for shortness of breath.    Gastrointestinal:  Negative for constipation, diarrhea, hematochezia, melena, nausea and vomiting.   Genitourinary:  Negative for hematuria.   Neurological:  Positive for dizziness and light-headedness. Negative for focal weakness, headaches, loss of balance and weakness.       Vitals:    01/08/25 0803   BP: 148/84   Pulse: 63     Vitals:    01/08/25 0803   Weight: 98 kg (216 lb)     Height: 5' 9\" (175.3 cm)   Body mass index is 31.9 kg/m².    Physical Exam  Constitutional:       Appearance: Normal appearance.   HENT:      Mouth/Throat:      Mouth: Mucous membranes are moist.   Neck:      Vascular: No carotid bruit.   Cardiovascular:      Rate and Rhythm: Normal rate and regular rhythm.      Pulses: Normal pulses.      Heart sounds: Murmur heard.   Pulmonary:      Effort: Pulmonary effort is normal.      Breath sounds: Normal breath sounds.   Abdominal:      General: Abdomen is protuberant. Bowel sounds are normal.      Palpations: Abdomen is soft.   Musculoskeletal:      Right lower leg: No edema.      Left lower leg: No edema.   Skin:     General: Skin is warm and dry.   Neurological:      General: No focal deficit present.      Mental Status: He is alert and oriented to person, place, and time.         "

## 2025-01-14 ENCOUNTER — APPOINTMENT (OUTPATIENT)
Dept: LAB | Facility: HOSPITAL | Age: 80
End: 2025-01-14
Payer: MEDICARE

## 2025-01-14 DIAGNOSIS — E78.2 MIXED HYPERLIPIDEMIA: ICD-10-CM

## 2025-01-14 LAB
CHOLEST SERPL-MCNC: 176 MG/DL (ref ?–200)
HDLC SERPL-MCNC: 56 MG/DL
LDLC SERPL CALC-MCNC: 98 MG/DL (ref 0–100)
NONHDLC SERPL-MCNC: 120 MG/DL
TRIGL SERPL-MCNC: 109 MG/DL (ref ?–150)

## 2025-01-14 PROCEDURE — 80061 LIPID PANEL: CPT

## 2025-01-14 PROCEDURE — 36415 COLL VENOUS BLD VENIPUNCTURE: CPT

## 2025-01-17 ENCOUNTER — HOSPITAL ENCOUNTER (OUTPATIENT)
Dept: NON INVASIVE DIAGNOSTICS | Age: 80
Discharge: HOME | End: 2025-01-17
Payer: MEDICARE

## 2025-01-17 ENCOUNTER — RESULTS FOLLOW-UP (OUTPATIENT)
Dept: NON INVASIVE DIAGNOSTICS | Facility: HOSPITAL | Age: 80
End: 2025-01-17

## 2025-01-17 VITALS
SYSTOLIC BLOOD PRESSURE: 148 MMHG | BODY MASS INDEX: 31.99 KG/M2 | WEIGHT: 216 LBS | DIASTOLIC BLOOD PRESSURE: 84 MMHG | HEART RATE: 67 BPM | HEIGHT: 69 IN

## 2025-01-17 DIAGNOSIS — Z95.2 S/P TAVR (TRANSCATHETER AORTIC VALVE REPLACEMENT): ICD-10-CM

## 2025-01-17 LAB
AORTIC ROOT: 3.2 CM
AORTIC VALVE MEAN VELOCITY: 15.4 M/S
ASCENDING AORTA: 3.2 CM
AV AREA BY CONTINUOUS VTI: 2.1 CM2
AV AREA PEAK VELOCITY: 1.8 CM2
AV LVOT MEAN GRADIENT: 2 MMHG
AV LVOT PEAK GRADIENT: 4 MMHG
AV MEAN PRESS GRAD SYS DOP V1V2: 11 MMHG
AV ORIFICE AREA US: 2.72 CM2
AV PEAK GRADIENT: 20 MMHG
AV VELOCITY RATIO: 0.51
AV VMAX SYS DOP: 2.21 M/S
BSA FOR ECHO PROCEDURE: 2.13 M2
DOP CALC AO VTI: 48.05 CM
DOP CALC LVOT AREA: 5.31 CM2
DOP CALC LVOT CARDIAC INDEX: 3.63 L/MIN/M2
DOP CALC LVOT CARDIAC OUTPUT: 7.73 L/MIN
DOP CALC LVOT DIAMETER: 2.6 CM
DOP CALC LVOT PEAK VEL VTI: 24.59 CM
DOP CALC LVOT PEAK VEL: 0.96 M/S
DOP CALC LVOT STROKE INDEX: 49.3 ML/M2
DOP CALC LVOT STROKE VOLUME: 130.49 CM3
E WAVE DECELERATION TIME: 194 MS
E/A RATIO: 0.56
FRACTIONAL SHORTENING: 24 (ref 28–44)
INTERVENTRICULAR SEPTUM IN DIASTOLE (PARASTERNAL SHORT AXIS VIEW): 1.2 CM
INTERVENTRICULAR SEPTUM: 1.2 CM (ref 0.6–1.1)
LAAS-AP2: 22.3 CM2
LAAS-AP4: 23 CM2
LEFT ATRIUM SIZE: 4.6 CM
LEFT ATRIUM VOLUME (MOD BIPLANE): 83 ML
LEFT ATRIUM VOLUME INDEX (MOD BIPLANE): 39 ML/M2
LEFT INTERNAL DIMENSION IN SYSTOLE: 4.1 CM (ref 2.1–4)
LEFT VENTRICLE DIASTOLIC VOLUME (MOD BIPLANE): 196 ML
LEFT VENTRICLE DIASTOLIC VOLUME INDEX (MOD BIPLANE): 92 ML/M2
LEFT VENTRICLE SYSTOLIC VOLUME (MOD BIPLANE): 87 ML
LEFT VENTRICLE SYSTOLIC VOLUME INDEX (MOD BIPLANE): 40.8 ML/M2
LEFT VENTRICULAR INTERNAL DIMENSION IN DIASTOLE: 5.4 CM (ref 3.5–6)
LEFT VENTRICULAR POSTERIOR WALL IN END DIASTOLE: 1.2 CM
LEFT VENTRICULAR STROKE VOLUME: 69 ML
LV EF BIPLANE MOD: 56 %
LV EF US.2D.A4C+ESTIMATED: 59 %
LVSV (TEICH): 69 ML
MV E'TISSUE VEL-LAT: 7 CM/S
MV E'TISSUE VEL-SEP: 7 CM/S
MV PEAK A VEL: 0.88 M/S
MV PEAK E VEL: 49 CM/S
MV STENOSIS PRESSURE HALF TIME: 56 MS
MV VALVE AREA P 1/2 METHOD: 3.93
RIGHT ATRIUM AREA SYSTOLE A4C: 15.2 CM2
RIGHT VENTRICLE ID DIMENSION: 3.4 CM
SL CV LEFT ATRIUM LENGTH A2C: 5 CM
SL CV LV EF: 55
SL CV PED ECHO LEFT VENTRICLE DIASTOLIC VOLUME (MOD BIPLANE) 2D: 141 ML
SL CV PED ECHO LEFT VENTRICLE SYSTOLIC VOLUME (MOD BIPLANE) 2D: 72 ML
TR MAX PG: 17 MMHG
TR PEAK VELOCITY: 2.1 M/S
TRICUSPID ANNULAR PLANE SYSTOLIC EXCURSION: 2.1 CM
TRICUSPID VALVE PEAK REGURGITATION VELOCITY: 2.07 M/S

## 2025-01-17 PROCEDURE — 93306 TTE W/DOPPLER COMPLETE: CPT

## 2025-01-17 PROCEDURE — 93306 TTE W/DOPPLER COMPLETE: CPT | Performed by: STUDENT IN AN ORGANIZED HEALTH CARE EDUCATION/TRAINING PROGRAM

## 2025-01-20 NOTE — TELEPHONE ENCOUNTER
Called patient to give him the results of his echocardiogram and lipid profile. All questions answered.

## 2025-01-23 DIAGNOSIS — E78.5 HYPERLIPIDEMIA, UNSPECIFIED HYPERLIPIDEMIA TYPE: ICD-10-CM

## 2025-01-23 RX ORDER — SIMVASTATIN 10 MG
10 TABLET ORAL DAILY
Qty: 90 TABLET | Refills: 1 | Status: SHIPPED | OUTPATIENT
Start: 2025-01-23

## 2025-01-30 DIAGNOSIS — I10 BENIGN ESSENTIAL HTN: ICD-10-CM

## 2025-01-30 RX ORDER — LOSARTAN POTASSIUM 25 MG/1
25 TABLET ORAL DAILY
Qty: 90 TABLET | Refills: 0 | Status: SHIPPED | OUTPATIENT
Start: 2025-01-30 | End: 2025-07-29

## 2025-02-24 ENCOUNTER — APPOINTMENT (OUTPATIENT)
Dept: RADIOLOGY | Facility: HOSPITAL | Age: 80
End: 2025-02-24
Payer: MEDICARE

## 2025-02-24 ENCOUNTER — HOSPITAL ENCOUNTER (EMERGENCY)
Facility: HOSPITAL | Age: 80
Discharge: HOME | End: 2025-02-24
Attending: EMERGENCY MEDICINE | Admitting: EMERGENCY MEDICINE
Payer: MEDICARE

## 2025-02-24 VITALS
RESPIRATION RATE: 17 BRPM | DIASTOLIC BLOOD PRESSURE: 85 MMHG | OXYGEN SATURATION: 99 % | TEMPERATURE: 98 F | HEART RATE: 80 BPM | SYSTOLIC BLOOD PRESSURE: 209 MMHG

## 2025-02-24 DIAGNOSIS — R07.89 CHEST WALL PAIN: Primary | ICD-10-CM

## 2025-02-24 DIAGNOSIS — I10 HTN (HYPERTENSION): ICD-10-CM

## 2025-02-24 LAB
2HR DELTA HS TROPONIN: 0 NG/L
ALBUMIN SERPL BCG-MCNC: 4.1 G/DL (ref 3.5–5)
ALP SERPL-CCNC: 47 U/L (ref 34–104)
ALT SERPL W P-5'-P-CCNC: 19 U/L (ref 7–52)
ANION GAP SERPL CALCULATED.3IONS-SCNC: 8 MMOL/L (ref 4–13)
AST SERPL W P-5'-P-CCNC: 17 U/L (ref 13–39)
BASOPHILS # BLD AUTO: 0.05 THOUSANDS/ÂΜL (ref 0–0.1)
BASOPHILS NFR BLD AUTO: 1 % (ref 0–1)
BILIRUB SERPL-MCNC: 0.43 MG/DL (ref 0.2–1)
BNP SERPL-MCNC: 114 PG/ML (ref 0–100)
BUN SERPL-MCNC: 23 MG/DL (ref 5–25)
CALCIUM SERPL-MCNC: 8.6 MG/DL (ref 8.4–10.2)
CARDIAC TROPONIN I PNL SERPL HS: 11 NG/L (ref ?–50)
CARDIAC TROPONIN I PNL SERPL HS: 11 NG/L (ref ?–50)
CHLORIDE SERPL-SCNC: 111 MMOL/L (ref 96–108)
CO2 SERPL-SCNC: 21 MMOL/L (ref 21–32)
CREAT SERPL-MCNC: 1.06 MG/DL (ref 0.6–1.3)
EOSINOPHIL # BLD AUTO: 0.12 THOUSAND/ÂΜL (ref 0–0.61)
EOSINOPHIL NFR BLD AUTO: 2 % (ref 0–6)
ERYTHROCYTE [DISTWIDTH] IN BLOOD BY AUTOMATED COUNT: 11.6 % (ref 11.6–15.1)
GFR SERPL CREATININE-BSD FRML MDRD: 66 ML/MIN/1.73SQ M
GLUCOSE SERPL-MCNC: 100 MG/DL (ref 65–140)
HCT VFR BLD AUTO: 40 % (ref 36.5–49.3)
HGB BLD-MCNC: 12.9 G/DL (ref 12–17)
IMM GRANULOCYTES # BLD AUTO: 0.07 THOUSAND/UL (ref 0–0.2)
IMM GRANULOCYTES NFR BLD AUTO: 1 % (ref 0–2)
LYMPHOCYTES # BLD AUTO: 1.76 THOUSANDS/ÂΜL (ref 0.6–4.47)
LYMPHOCYTES NFR BLD AUTO: 22 % (ref 14–44)
MCH RBC QN AUTO: 33.4 PG (ref 26.8–34.3)
MCHC RBC AUTO-ENTMCNC: 32.3 G/DL (ref 31.4–37.4)
MCV RBC AUTO: 104 FL (ref 82–98)
MONOCYTES # BLD AUTO: 0.77 THOUSAND/ÂΜL (ref 0.17–1.22)
MONOCYTES NFR BLD AUTO: 10 % (ref 4–12)
NEUTROPHILS # BLD AUTO: 5.25 THOUSANDS/ÂΜL (ref 1.85–7.62)
NEUTS SEG NFR BLD AUTO: 64 % (ref 43–75)
NRBC BLD AUTO-RTO: 0 /100 WBCS
PLATELET # BLD AUTO: 173 THOUSANDS/UL (ref 149–390)
PMV BLD AUTO: 10.2 FL (ref 8.9–12.7)
POTASSIUM SERPL-SCNC: 4.2 MMOL/L (ref 3.5–5.3)
PROT SERPL-MCNC: 6.8 G/DL (ref 6.4–8.4)
RBC # BLD AUTO: 3.86 MILLION/UL (ref 3.88–5.62)
SODIUM SERPL-SCNC: 140 MMOL/L (ref 135–147)
WBC # BLD AUTO: 8.02 THOUSAND/UL (ref 4.31–10.16)

## 2025-02-24 PROCEDURE — 80053 COMPREHEN METABOLIC PANEL: CPT

## 2025-02-24 PROCEDURE — 85025 COMPLETE CBC W/AUTO DIFF WBC: CPT

## 2025-02-24 PROCEDURE — 84484 ASSAY OF TROPONIN QUANT: CPT

## 2025-02-24 PROCEDURE — 96365 THER/PROPH/DIAG IV INF INIT: CPT

## 2025-02-24 PROCEDURE — 71046 X-RAY EXAM CHEST 2 VIEWS: CPT

## 2025-02-24 PROCEDURE — 36415 COLL VENOUS BLD VENIPUNCTURE: CPT

## 2025-02-24 PROCEDURE — 99285 EMERGENCY DEPT VISIT HI MDM: CPT | Performed by: EMERGENCY MEDICINE

## 2025-02-24 PROCEDURE — 83880 ASSAY OF NATRIURETIC PEPTIDE: CPT | Performed by: EMERGENCY MEDICINE

## 2025-02-24 PROCEDURE — 93005 ELECTROCARDIOGRAM TRACING: CPT

## 2025-02-24 PROCEDURE — 96375 TX/PRO/DX INJ NEW DRUG ADDON: CPT

## 2025-02-24 PROCEDURE — 99285 EMERGENCY DEPT VISIT HI MDM: CPT

## 2025-02-24 RX ORDER — MAGNESIUM SULFATE HEPTAHYDRATE 40 MG/ML
2 INJECTION, SOLUTION INTRAVENOUS ONCE
Status: COMPLETED | OUTPATIENT
Start: 2025-02-24 | End: 2025-02-24

## 2025-02-24 RX ORDER — ACETAMINOPHEN 10 MG/ML
1000 INJECTION, SOLUTION INTRAVENOUS ONCE
Status: COMPLETED | OUTPATIENT
Start: 2025-02-24 | End: 2025-02-24

## 2025-02-24 RX ADMIN — ACETAMINOPHEN 1000 MG: 10 INJECTION INTRAVENOUS at 18:29

## 2025-02-24 RX ADMIN — SODIUM CHLORIDE 500 ML: 0.9 INJECTION, SOLUTION INTRAVENOUS at 18:29

## 2025-02-24 RX ADMIN — MAGNESIUM SULFATE HEPTAHYDRATE 2 G: 40 INJECTION, SOLUTION INTRAVENOUS at 18:46

## 2025-02-25 NOTE — ED NOTES
"Pt visibly anxious and ready to go home. Pt states \" I did not take my Bp medication today and will take them when I get home. Provider aware and at bedside.      Shelly Mishra RN  02/24/25 2030    "

## 2025-02-25 NOTE — ED PROVIDER NOTES
Time reflects when diagnosis was documented in both MDM as applicable and the Disposition within this note       Time User Action Codes Description Comment    2/24/2025  8:02 PM Be Tai Add [R07.89] Chest wall pain     2/24/2025  8:17 PM Be Tai Add [I10] HTN (hypertension)           ED Disposition       ED Disposition   Discharge    Condition   Stable    Date/Time   Mon Feb 24, 2025  8:02 PM    Comment   Akhil Gómez discharge to home/self care.                   Assessment & Plan       Medical Decision Making  Blood work, EKG, troponin x 2  Give IV fluids, Tylenol, magnesium and continue to monitor patient for any worsening symptoms    Patient is EKG was unremarkable.  Patient had 2 consecutive troponin that was 11 with a delta of 0.  Patient remained chest pain-free in the emergency department.  At this time patient symptoms are most likely musculoskeletal in origin as patient does do upper body exercises 5 times a week at the gym and his symptoms started after he came back from the gym.  At this time patient is discharged home with as needed over-the-counter pain medication as needed.  Discussed adequate hydration with patient.  Patient discharged home with follow-up to PCP as well as his cardiologist.  Patient's blood pressure was elevated in the ED.  Patient did not take his evening BP medications.  I offered to give them however patient states that he will go home and take it himself.  Close return instructions given to return to the ER for any worsening symptoms.  Patient agrees with discharge plan.  Patient well appearing at time of discharge.    Please Note: Fluency Direct voice recognition software may have been used in the creation of this document. Wrong words or sound a like substitutions may have occurred due to the inherent limitations of the voice software.         Amount and/or Complexity of Data Reviewed  External Data Reviewed: labs and ECG.  Labs: ordered. Decision-making  "details documented in ED Course.    Risk  Prescription drug management.             Medications   sodium chloride 0.9 % bolus 500 mL (0 mL Intravenous Stopped 2/24/25 1929)   acetaminophen (Ofirmev) injection 1,000 mg (0 mg Intravenous Stopped 2/24/25 1844)   magnesium sulfate 2 g/50 mL IVPB (premix) 2 g (0 g Intravenous Stopped 2/1946)       ED Risk Strat Scores   HEART Risk Score      Flowsheet Row Most Recent Value   Heart Score Risk Calculator    History 0 Filed at: 02/24/2025 2011   ECG 0 Filed at: 02/24/2025 2011   Age 2 Filed at: 02/24/2025 2011   Risk Factors 1 Filed at: 02/24/2025 2011   Troponin 0 Filed at: 02/24/2025 2011   HEART Score 3 Filed at: 02/24/2025 2011          HEART Risk Score      Flowsheet Row Most Recent Value   Heart Score Risk Calculator    History 0 Filed at: 02/24/2025 2011   ECG 0 Filed at: 02/24/2025 2011   Age 2 Filed at: 02/24/2025 2011   Risk Factors 1 Filed at: 02/24/2025 2011   Troponin 0 Filed at: 02/24/2025 2011   HEART Score 3 Filed at: 02/24/2025 2011                              SBIRT 20yo+      Flowsheet Row Most Recent Value   Initial Alcohol Screen: US AUDIT-C     1. How often do you have a drink containing alcohol? 0 Filed at: 02/24/2025 1931   2. How many drinks containing alcohol do you have on a typical day you are drinking?  0 Filed at: 02/24/2025 1931   3a. Male UNDER 65: How often do you have five or more drinks on one occasion? 0 Filed at: 02/24/2025 1931   3b. FEMALE Any Age, or MALE 65+: How often do you have 4 or more drinks on one occassion? 0 Filed at: 02/24/2025 1931   Audit-C Score 0 Filed at: 02/24/2025 1931   CHUCK: How many times in the past year have you...    Used an illegal drug or used a prescription medication for non-medical reasons? Never Filed at: 02/24/2025 1931                            History of Present Illness       Chief Complaint   Patient presents with    Chest Pain     Patient states he started with a \"short shocking\" pain in the " left side of his chest. Comes and goes. Patient went to the gym. Still continued to happen after he got home. Had a valve replacement 3 years ago. 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    CAD (coronary artery disease)     Cataract     CHF (congestive heart failure) (HCC)     Encounter for pre-operative laboratory testing 2/15/2022    Glaucoma     Hyperchloremia 2/22/2022    Hyperlipidemia     Moderate aortic insufficiency     Prediabetes     Severe aortic stenosis     Sinus bradycardia     Umbilical hernia       Past Surgical History:   Procedure Laterality Date    ARTHROSCOPY KNEE Right     BUNIONECTOMY Right     CARDIAC CATHETERIZATION Bilateral 2/14/2022    Procedure: Cardiac catheterization;  Surgeon: Mohan Perez MD;  Location: [facility];  Service: Cardiology    CARDIAC CATHETERIZATION N/A 2/22/2022    Procedure: CARDIAC TAVR;  Surgeon: Randall Webb MD;  Location: [facility];  Service: Cardiology    HEMORRHOID SURGERY      KS ECHO TRANSESOPHAG R-T 2D W/PRB IMG ACQUISJ I&R N/A 2/22/2022    Procedure: TRANSESOPHAGEAL ECHOCARDIOGRAM (ELISSA);  Surgeon: CHRISTOPHER Fabian MD;  Location: [facility];  Service: Cardiac Surgery    KS REPLACE AORTIC VALVE OPENFEMORAL ARTERY APPROACH N/A 2/22/2022    Procedure: REPLACEMENT AORTIC VALVE TRANSCATHETER (TAVR) TRANSFEMORAL W/ 29MM SARKAR PAULO S3 ULTRA VALVE(ACCESS ON LEFT);  Surgeon: CHRISTOPHER Fabian MD;  Location: [facility];  Service: Cardiac Surgery    SHOULDER SURGERY  2012    TONSILLECTOMY      childhood      Family History   Problem Relation Age of Onset    Colon cancer Mother     Other Father         heart problem    Parkinsonism Family       Social History     Tobacco Use    Smoking status: Never    Smokeless tobacco: Never   Vaping Use    Vaping status: Never Used   Substance Use Topics    Alcohol use: Yes     Alcohol/week: 2.0 standard drinks of  alcohol     Types: 2 Cans of beer per week     Comment: twice a wk     Drug use: No      E-Cigarette/Vaping    E-Cigarette Use Never User       E-Cigarette/Vaping Substances    Nicotine No     THC No     CBD No     Flavoring No       I have reviewed and agree with the history as documented.     79-year-old male with past history of hyperlipidemia, umbilical hernia, prediabetes, aortic stenosis status post aortic valve replacement, CHF, presents to the ED for evaluation of intermittent right anterior chest wall sharp pain since this morning.  Patient states that pain comes and goes.  Pain is in his left pectoralis muscle.  Patient became concerned that this pain may be cardiac related so patient came to the ED for further evaluation.  Patient is followed closely by cardiology.  Patient recently had an echo done that was of no concern according to patient.  Patient is chest pain-free at this time.  Patient states that he goes to the gym 5 days a week and works out.  Patient does work on his upper body with machine.  Symptoms started to again today after he returned from the gym.  Patient does not think he is drinking enough fluids.      History provided by:  Patient  Chest Pain  Associated symptoms: no abdominal pain, no back pain, no cough, no fever, no palpitations, no shortness of breath and not vomiting        Review of Systems   Constitutional:  Negative for chills and fever.   HENT:  Negative for ear pain and sore throat.    Eyes:  Negative for pain and visual disturbance.   Respiratory:  Negative for cough and shortness of breath.    Cardiovascular:  Positive for chest pain. Negative for palpitations.   Gastrointestinal:  Negative for abdominal pain and vomiting.   Genitourinary:  Negative for dysuria and hematuria.   Musculoskeletal:  Negative for arthralgias and back pain.   Skin:  Negative for color change and rash.   Neurological:  Negative for seizures and syncope.   All other systems reviewed and are  negative.          Objective       ED Triage Vitals   Temperature Pulse Blood Pressure Respirations SpO2 Patient Position - Orthostatic VS   02/24/25 1713 02/24/25 0200 02/24/25 0200 02/24/25 0200 02/24/25 0200 02/24/25 1713   98 °F (36.7 °C) 80 (!) 209/90 17 99 % Sitting      Temp Source Heart Rate Source BP Location FiO2 (%) Pain Score    02/24/25 1713 02/24/25 0200 02/24/25 1713 -- --    Temporal Monitor Right arm        Vitals      Date and Time Temp Pulse SpO2 Resp BP Pain Score FACES Pain Rating User   02/24/25 2000 -- 80 99 % 17 209/85 -- -- LK   02/24/25 1713 98 °F (36.7 °C) 78 100 % 18 191/87 -- -- AM   02/24/25 0200 -- 80 99 % 17 209/90 -- -- LK            Physical Exam  Vitals and nursing note reviewed.   Constitutional:       General: He is not in acute distress.     Appearance: He is well-developed.   HENT:      Head: Normocephalic and atraumatic.   Eyes:      Conjunctiva/sclera: Conjunctivae normal.   Cardiovascular:      Rate and Rhythm: Normal rate and regular rhythm.      Heart sounds: No murmur heard.  Pulmonary:      Effort: Pulmonary effort is normal. No respiratory distress.      Breath sounds: Normal breath sounds.      Comments: Lungs are clear to auscultation bilateral.  No chest wall tenderness noted to palpation bilaterally.  Abdominal:      Palpations: Abdomen is soft.      Tenderness: There is no abdominal tenderness.   Musculoskeletal:         General: No swelling.      Cervical back: Neck supple.   Skin:     General: Skin is warm and dry.      Capillary Refill: Capillary refill takes less than 2 seconds.   Neurological:      Mental Status: He is alert.   Psychiatric:         Mood and Affect: Mood normal.         Results Reviewed       Procedure Component Value Units Date/Time    HS Troponin I 2hr [[id number]]  (Normal) Collected: 02/24/25 1926    Lab Status: Final result Specimen: Blood from Arm, Left Updated: 02/24/25 1952     hs TnI 2hr 11 ng/L      Delta 2hr hsTnI 0 ng/L     HS  Troponin I 4hr [[id number]]     Lab Status: No result Specimen: Blood     B-Type Natriuretic Peptide(BNP) [[id number]]  (Abnormal) Collected: 02/24/25 1714    Lab Status: Final result Specimen: Blood from Arm, Right Updated: 02/24/25 1837      pg/mL     HS Troponin 0hr (reflex protocol) [[id number]]  (Normal) Collected: 02/24/25 1714    Lab Status: Final result Specimen: Blood from Arm, Right Updated: 02/24/25 1747     hs TnI 0hr 11 ng/L     Comprehensive metabolic panel [[id number]]  (Abnormal) Collected: 02/24/25 1714    Lab Status: Final result Specimen: Blood from Arm, Right Updated: 02/24/25 1740     Sodium 140 mmol/L      Potassium 4.2 mmol/L      Chloride 111 mmol/L      CO2 21 mmol/L      ANION GAP 8 mmol/L      BUN 23 mg/dL      Creatinine 1.06 mg/dL      Glucose 100 mg/dL      Calcium 8.6 mg/dL      AST 17 U/L      ALT 19 U/L      Alkaline Phosphatase 47 U/L      Total Protein 6.8 g/dL      Albumin 4.1 g/dL      Total Bilirubin 0.43 mg/dL      eGFR 66 ml/min/1.73sq m     Narrative:      National Kidney Disease Foundation guidelines for Chronic Kidney Disease (CKD):     Stage 1 with normal or high GFR (GFR > 90 mL/min/1.73 square meters)    Stage 2 Mild CKD (GFR = 60-89 mL/min/1.73 square meters)    Stage 3A Moderate CKD (GFR = 45-59 mL/min/1.73 square meters)    Stage 3B Moderate CKD (GFR = 30-44 mL/min/1.73 square meters)    Stage 4 Severe CKD (GFR = 15-29 mL/min/1.73 square meters)    Stage 5 End Stage CKD (GFR <15 mL/min/1.73 square meters)  Note: GFR calculation is accurate only with a steady state creatinine    CBC and differential [[id number]]  (Abnormal) Collected: 02/24/25 1714    Lab Status: Final result Specimen: Blood from Arm, Right Updated: 02/24/25 1719     WBC 8.02 Thousand/uL      RBC 3.86 Million/uL      Hemoglobin 12.9 g/dL      Hematocrit 40.0 %       fL      MCH 33.4 pg      MCHC 32.3 g/dL      RDW 11.6 %      MPV 10.2 fL      Platelets 173 Thousands/uL      nRBC 0 /100 WBCs       Segmented % 64 %      Immature Grans % 1 %      Lymphocytes % 22 %      Monocytes % 10 %      Eosinophils Relative 2 %      Basophils Relative 1 %      Absolute Neutrophils 5.25 Thousands/µL      Absolute Immature Grans 0.07 Thousand/uL      Absolute Lymphocytes 1.76 Thousands/µL      Absolute Monocytes 0.77 Thousand/µL      Eosinophils Absolute 0.12 Thousand/µL      Basophils Absolute 0.05 Thousands/µL             XR chest 2 views   Final Interpretation by Jake Amado MD (02/24 1844)      Minimal atelectasis or infiltrate in the left lung base.            Workstation performed: [id number]             ECG 12 Lead Documentation Only    Date/Time: 2/24/2025 5:09 PM    Performed by: Be Tai DO  Authorized by: Be aTi DO    Indications / Diagnosis:  Pain  ECG reviewed by me, the ED Provider: yes    Patient location:  ED  Previous ECG:     Previous ECG:  Compared to current    Similarity:  No change    Comparison to cardiac monitor: Yes    Interpretation:     Interpretation: normal    Comments:      Sinus rhythm, rate 65, normal axis, normal intervals, no acute ST/T wave abnormalities noted, otherwise unremarkable EKG, unchanged from previous study.      ED Medication and Procedure Management   Prior to Admission Medications   Prescriptions Last Dose Informant Patient Reported? Taking?   aspirin 81 mg chewable tablet  Self No No   Sig: Chew 1 tablet (81 mg total) daily   dorzolamide-timolol (COSOPT) 22.3-6.8 MG/ML ophthalmic solution  Self Yes No   Sig: INSTILL 1 DROP INTO BOTH EYES TWICE A DAY   latanoprost (XALATAN) 0.005 % ophthalmic solution  Self Yes No   Sig: INSTILL 1 DROP INTO EACH EYE ONCE DAILY AT NIGHT   losartan (COZAAR) 25 mg tablet   No No   Sig: TAKE 1 TABLET (25 MG TOTAL) BY MOUTH DAILY.   metoprolol tartrate (LOPRESSOR) 25 mg tablet  Self No No   Sig: TAKE 1/2 TABLET BY MOUTH EVERY 12 HOURS   simvastatin (ZOCOR) 10 mg tablet   No No   Sig: TAKE 1 TABLET BY MOUTH EVERY  DAY      Facility-Administered Medications: None     Discharge Medication List as of 2/24/2025  8:17 PM        CONTINUE these medications which have NOT CHANGED    Details   aspirin 81 mg chewable tablet Chew 1 tablet (81 mg total) daily, Starting Thu 2/24/2022, Normal      dorzolamide-timolol (COSOPT) 22.3-6.8 MG/ML ophthalmic solution INSTILL 1 DROP INTO BOTH EYES TWICE A DAY, Historical Med      latanoprost (XALATAN) 0.005 % ophthalmic solution INSTILL 1 DROP INTO EACH EYE ONCE DAILY AT NIGHT, Historical Med      losartan (COZAAR) 25 mg tablet TAKE 1 TABLET (25 MG TOTAL) BY MOUTH DAILY., Starting Thu 1/30/2025, Until Tue 7/29/2025, Normal      metoprolol tartrate (LOPRESSOR) 25 mg tablet TAKE 1/2 TABLET BY MOUTH EVERY 12 HOURS, Starting Fri 12/27/2024, Normal      simvastatin (ZOCOR) 10 mg tablet TAKE 1 TABLET BY MOUTH EVERY DAY, Starting Thu 1/23/2025, Normal           No discharge procedures on file.  ED SEPSIS DOCUMENTATION   Time reflects when diagnosis was documented in both MDM as applicable and the Disposition within this note       Time User Action Codes Description Comment    2/24/2025  8:02 PM Be Tai [R07.89] Chest wall pain     2/24/2025  8:17 PM Be Tai [I10] HTN (hypertension)                  Be Tai DO  02/24/25 2035

## 2025-02-28 LAB
ATRIAL RATE: 65 BPM
P AXIS: 39 DEGREES
PR INTERVAL: 168 MS
QRS AXIS: 18 DEGREES
QRSD INTERVAL: 86 MS
QT INTERVAL: 378 MS
QTC INTERVAL: 393 MS
T WAVE AXIS: 18 DEGREES
VENTRICULAR RATE: 65 BPM

## 2025-02-28 PROCEDURE — 93010 ELECTROCARDIOGRAM REPORT: CPT | Performed by: INTERNAL MEDICINE

## 2025-03-03 ENCOUNTER — RA CDI HCC (OUTPATIENT)
Dept: OTHER | Facility: HOSPITAL | Age: 80
End: 2025-03-03

## 2025-03-03 PROBLEM — I11.0 HYPERTENSIVE HEART DISEASE WITH CONGESTIVE HEART FAILURE (HCC): Status: ACTIVE | Noted: 2025-03-03

## 2025-03-06 ENCOUNTER — TELEPHONE (OUTPATIENT)
Dept: ADMINISTRATIVE | Facility: OTHER | Age: 80
End: 2025-03-06

## 2025-03-06 NOTE — TELEPHONE ENCOUNTER
03/06/25 3:44 PM    Patient contacted to bring Advance Directive, POLST, or Living Will document to next scheduled pcp visit.[facility] Department spoke with patient/ caregiver.    Thank you.  Lyndsey Spears MA  [facility]

## 2025-03-10 ENCOUNTER — OFFICE VISIT (OUTPATIENT)
Dept: FAMILY MEDICINE CLINIC | Facility: HOSPITAL | Age: 80
End: 2025-03-10
Payer: MEDICARE

## 2025-03-10 VITALS
WEIGHT: 215.2 LBS | BODY MASS INDEX: 33.78 KG/M2 | HEIGHT: 67 IN | SYSTOLIC BLOOD PRESSURE: 160 MMHG | DIASTOLIC BLOOD PRESSURE: 88 MMHG | OXYGEN SATURATION: 98 % | HEART RATE: 63 BPM

## 2025-03-10 DIAGNOSIS — R07.89 CHEST PAIN, ATYPICAL: ICD-10-CM

## 2025-03-10 DIAGNOSIS — I11.0 HYPERTENSIVE HEART DISEASE WITH CONGESTIVE HEART FAILURE, UNSPECIFIED HEART FAILURE TYPE (HCC): ICD-10-CM

## 2025-03-10 DIAGNOSIS — R73.03 PREDIABETES: ICD-10-CM

## 2025-03-10 DIAGNOSIS — E78.2 MIXED HYPERLIPIDEMIA: ICD-10-CM

## 2025-03-10 DIAGNOSIS — Z95.2 S/P TAVR (TRANSCATHETER AORTIC VALVE REPLACEMENT): ICD-10-CM

## 2025-03-10 DIAGNOSIS — I50.9 CONGESTIVE HEART FAILURE, UNSPECIFIED HF CHRONICITY, UNSPECIFIED HEART FAILURE TYPE (HCC): ICD-10-CM

## 2025-03-10 DIAGNOSIS — Z00.00 MEDICARE ANNUAL WELLNESS VISIT, SUBSEQUENT: Primary | ICD-10-CM

## 2025-03-10 DIAGNOSIS — Z12.5 SCREENING PSA (PROSTATE SPECIFIC ANTIGEN): ICD-10-CM

## 2025-03-10 DIAGNOSIS — I10 BENIGN ESSENTIAL HTN: ICD-10-CM

## 2025-03-10 PROCEDURE — G0439 PPPS, SUBSEQ VISIT: HCPCS | Performed by: FAMILY MEDICINE

## 2025-03-10 PROCEDURE — G2211 COMPLEX E/M VISIT ADD ON: HCPCS | Performed by: FAMILY MEDICINE

## 2025-03-10 PROCEDURE — 99214 OFFICE O/P EST MOD 30 MIN: CPT | Performed by: FAMILY MEDICINE

## 2025-03-10 RX ORDER — LOSARTAN POTASSIUM 50 MG/1
50 TABLET ORAL DAILY
Qty: 100 TABLET | Refills: 1 | Status: SHIPPED | OUTPATIENT
Start: 2025-03-10 | End: 2025-09-26

## 2025-03-10 NOTE — PROGRESS NOTES
Name: Akhil Gómez      : 1945      MRN: [id number]  Encounter Provider: Chet Delarosa MD  Encounter Date: 3/10/2025   Encounter department: [facility] C[facility] [address]     Assessment & Plan  Congestive heart failure, unspecified HF chronicity, unspecified heart failure type (HCC)  Wt Readings from Last 3 Encounters:   03/10/25 97.6 kg (215 lb 3.2 oz)   25 98 kg (216 lb)   25 98 kg (216 lb)     Stable.  Euvolemic today.  With cardiology.               Mixed hyperlipidemia  Stable.  Orders:    CBC; Future    Comprehensive metabolic panel; Future    Lipid Panel with Direct LDL reflex; Future    Hypertensive heart disease with congestive heart failure, unspecified heart failure type (HCC)  Wt Readings from Last 3 Encounters:   03/10/25 97.6 kg (215 lb 3.2 oz)   25 98 kg (216 lb)   25 98 kg (216 lb)     Elevated blood pressure today.  Has been somewhat consistently high.  Increase losartan to 50 mg daily.  Will continue to monitor.  Advised blood pressure monitoring at home.          Orders:    losartan (COZAAR) 50 mg tablet; Take 1 tablet (50 mg total) by mouth daily    Prediabetes  Work on dietary control and exercise.  Orders:    Hemoglobin A1C; Future    S/P TAVR (transcatheter aortic valve replacement)  Ongoing follow-up with cardiology.       Benign essential HTN  Increase Cozaar to 50 mg daily.    Monitor blood pressure at home.  Continue with salt restriction.      Orders:    losartan (COZAAR) 50 mg tablet; Take 1 tablet (50 mg total) by mouth daily    Medicare annual wellness visit, subsequent         Chest pain, atypical         Screening PSA (prostate specific antigen)    Orders:    PSA, Total Screen; Future       Preventive health issues were discussed with patient, and age appropriate screening tests were ordered as noted in patient's After Visit Summary. Personalized health advice and appropriate referrals for health education or preventive  services given if needed, as noted in patient's After Visit Summary.    History of Present Illness     Christiano is here for Medicare wellness as well as follow-up of chronic conditions.    Since the last visit he was in the emergency room with some mild chest pains.  Not associated with shortness of breath or diaphoresis.  This has since resolved.  The concern at that time was due to mild dehydration.  Labs did not indicate a dehydrated state.  Troponins, EKG were unremarkable.    He currently does not have any further chest pains.  No dyspnea or chest pain on exertion.  No shortness of breath.  Has a follow-up with cardiology coming up.           Patient Care Team:  Chet Delarosa MD as PCP - General    Review of Systems   Constitutional: Negative.  Negative for activity change, appetite change, chills and diaphoresis.   HENT:  Negative for congestion and dental problem.    Respiratory: Negative.  Negative for apnea, chest tightness, shortness of breath and wheezing.    Cardiovascular: Negative.  Negative for chest pain, palpitations and leg swelling.   Gastrointestinal: Negative.  Negative for abdominal distention, abdominal pain, constipation, diarrhea and nausea.   Genitourinary: Negative.  Negative for difficulty urinating, dysuria and frequency.     Medical History Reviewed by provider this encounter:  Allergies  Meds       Annual Wellness Visit Questionnaire   Akhil is here for his Subsequent Wellness visit.     Health Risk Assessment:   Patient rates overall health as good. Patient feels that their physical health rating is same. Patient is satisfied with their life. Eyesight was rated as slightly worse. Hearing was rated as same. Patient feels that their emotional and mental health rating is same. Patients states they are never, rarely angry. Patient states they are never, rarely unusually tired/fatigued. Pain experienced in the last 7 days has been none. Patient states that he has experienced no  weight loss or gain in last 6 months.     Depression Screening:   PHQ-2 Score: 0      Fall Risk Screening:   In the past year, patient has experienced: no history of falling in past year      Home Safety:  Patient does not have trouble with stairs inside or outside of their home. Patient has working smoke alarms and has no working carbon monoxide detector. Home safety hazards include: none.     Nutrition:   Current diet is Regular.     Medications:   Patient is currently taking over-the-counter supplements. OTC medications include: see medication list. Patient is able to manage medications.     Activities of Daily Living (ADLs)/Instrumental Activities of Daily Living (IADLs):   Walk and transfer into and out of bed and chair?: Yes  Dress and groom yourself?: Yes    Bathe or shower yourself?: Yes    Feed yourself? Yes  Do your laundry/housekeeping?: Yes  Manage your money, pay your bills and track your expenses?: Yes  Make your own meals?: Yes    Do your own shopping?: Yes    Previous Hospitalizations:   Any hospitalizations or ED visits within the last 12 months?: Yes    How many hospitalizations have you had in the last year?: 1-2    Advance Care Planning:   Living will: No    Durable POA for healthcare: No    Advanced directive: Yes    Advanced directive counseling given: Yes      Cognitive Screening:   Provider or family/friend/caregiver concerned regarding cognition?: No    PREVENTIVE SCREENINGS      Cardiovascular Screening:    General: Screening Not Indicated and History Lipid Disorder      Diabetes Screening:     General: Screening Current      Prostate Cancer Screening:    General: Screening Not Indicated      Lung Cancer Screening:     General: Screening Not Indicated      Hepatitis C Screening:    General: Screening Current    Screening, Brief Intervention, and Referral to Treatment (SBIRT)     Screening      Single Item Drug Screening:  How often have you used an illegal drug (including marijuana) or a  "prescription medication for non-medical reasons in the past year? never    Single Item Drug Screen Score: 0  Interpretation: Negative screen for possible drug use disorder    Social Drivers of Health     Financial Resource Strain: Low Risk  (3/6/2024)    Overall Financial Resource Strain (CARDIA)     Difficulty of Paying Living Expenses: Not hard at all   Food Insecurity: No Food Insecurity (3/10/2025)    Hunger Vital Sign     Worried About Running Out of Food in the Last Year: Never true     Ran Out of Food in the Last Year: Never true   Transportation Needs: No Transportation Needs (3/10/2025)    PRAPARE - Transportation     Lack of Transportation (Medical): No     Lack of Transportation (Non-Medical): No   Housing Stability: Low Risk  (3/10/2025)    Housing Stability Vital Sign     Unable to Pay for Housing in the Last Year: No     Number of Times Moved in the Last Year: 0     Homeless in the Last Year: No   Utilities: Not At Risk (3/10/2025)    [facility] Utilities     Threatened with loss of utilities: No     Vision Screening    Right eye Left eye Both eyes   Without correction      With correction 20/30 20/30 20/30       Objective   /88 (BP Location: Left arm, Patient Position: Sitting)   Pulse 63   Ht 5' 6.93\" (1.7 m)   Wt 97.6 kg (215 lb 3.2 oz)   SpO2 98%   BMI 33.78 kg/m²     Physical Exam  Vitals reviewed.   Constitutional:       General: He is not in acute distress.     Appearance: He is well-developed. He is not ill-appearing.   HENT:      Head: Normocephalic and atraumatic.      Right Ear: Tympanic membrane, ear canal and external ear normal.      Left Ear: Tympanic membrane, ear canal and external ear normal.      Mouth/Throat:      Mouth: Mucous membranes are moist.      Pharynx: Oropharynx is clear.   Eyes:      Extraocular Movements: Extraocular movements intact.      Conjunctiva/sclera: Conjunctivae normal.      Pupils: Pupils are equal, round, and reactive to light.   Cardiovascular:      " Rate and Rhythm: Normal rate and regular rhythm.      Heart sounds: Normal heart sounds. No murmur heard.  Pulmonary:      Effort: Pulmonary effort is normal.      Breath sounds: Normal breath sounds.   Abdominal:      General: Bowel sounds are normal. There is no distension.      Palpations: Abdomen is soft. There is no mass.      Tenderness: There is no abdominal tenderness. There is no guarding.      Hernia: No hernia is present.   Musculoskeletal:         General: Normal range of motion.      Cervical back: Normal range of motion and neck supple.   Skin:     General: Skin is warm and dry.      Capillary Refill: Capillary refill takes less than 2 seconds.   Neurological:      General: No focal deficit present.      Mental Status: He is alert and oriented to person, place, and time.   Psychiatric:         Mood and Affect: Mood normal.         Behavior: Behavior normal.

## 2025-03-10 NOTE — ASSESSMENT & PLAN NOTE
Wt Readings from Last 3 Encounters:   03/10/25 97.6 kg (215 lb 3.2 oz)   01/17/25 98 kg (216 lb)   01/08/25 98 kg (216 lb)     Stable.  Euvolemic today.  With cardiology.

## 2025-03-10 NOTE — PATIENT INSTRUCTIONS
Medicare Preventive Visit Patient Instructions  Thank you for completing your Welcome to Medicare Visit or Medicare Annual Wellness Visit today. Your next wellness visit will be due in one year (3/11/2026).  The screening/preventive services that you may require over the next 5-10 years are detailed below. Some tests may not apply to you based off risk factors and/or age. Screening tests ordered at today's visit but not completed yet may show as past due. Also, please note that scanned in results may not display below.  Preventive Screenings:  Service Recommendations Previous Testing/Comments   Colorectal Cancer Screening  Colonoscopy    Fecal Occult Blood Test (FOBT)/Fecal Immunochemical Test (FIT)  Fecal DNA/Cologuard Test  Flexible Sigmoidoscopy Age: 45-75 years old   Colonoscopy: every 10 years (May be performed more frequently if at higher risk)  OR  FOBT/FIT: every 1 year  OR  Cologuard: every 3 years  OR  Sigmoidoscopy: every 5 years  Screening may be recommended earlier than age 45 if at higher risk for colorectal cancer. Also, an individualized decision between you and your healthcare provider will decide whether screening between the ages of 76-85 would be appropriate. Colonoscopy: 06/17/2021  FOBT/FIT: Not on file  Cologuard: Not on file  Sigmoidoscopy: Not on file          Prostate Cancer Screening Individualized decision between patient and health care provider in men between ages of 55-69   Medicare will cover every 12 months beginning on the day after your 50th birthday PSA: 0.80 ng/mL     Screening Not Indicated     Hepatitis C Screening Once for adults born between 1945 and 1965  More frequently in patients at high risk for Hepatitis C Hep C Antibody: 10/27/2021    Screening Current   Diabetes Screening 1-2 times per year if you're at risk for diabetes or have pre-diabetes Fasting glucose: 116 mg/dL (3/8/2024)  A1C: 5.7 % (8/21/2023)  Screening Current   Cholesterol Screening Once every 5 years if  you don't have a lipid disorder. May order more often based on risk factors. Lipid panel: 01/14/2025  Screening Not Indicated  History Lipid Disorder      Other Preventive Screenings Covered by Medicare:  Abdominal Aortic Aneurysm (AAA) Screening: covered once if your at risk. You're considered to be at risk if you have a family history of AAA or a male between the age of 65-75 who smoking at least 100 cigarettes in your lifetime.  Lung Cancer Screening: covers low dose CT scan once per year if you meet all of the following conditions: (1) Age 55-77; (2) No signs or symptoms of lung cancer; (3) Current smoker or have quit smoking within the last 15 years; (4) You have a tobacco smoking history of at least 20 pack years (packs per day x number of years you smoked); (5) You get a written order from a healthcare provider.  Glaucoma Screening: covered annually if you're considered high risk: (1) You have diabetes OR (2) Family history of glaucoma OR (3)  aged 50 and older OR (4)  American aged 65 and older  Osteoporosis Screening: covered every 2 years if you meet one of the following conditions: (1) Have a vertebral abnormality; (2) On glucocorticoid therapy for more than 3 months; (3) Have primary hyperparathyroidism; (4) On osteoporosis medications and need to assess response to drug therapy.  HIV Screening: covered annually if you're between the age of 15-65. Also covered annually if you are younger than 15 and older than 65 with risk factors for HIV infection. For pregnant patients, it is covered up to 3 times per pregnancy.    Immunizations:  Immunization Recommendations   Influenza Vaccine Annual influenza vaccination during flu season is recommended for all persons aged >= 6 months who do not have contraindications   Pneumococcal Vaccine   * Pneumococcal conjugate vaccine = PCV13 (Prevnar 13), PCV15 (Vaxneuvance), PCV20 (Prevnar 20)  * Pneumococcal polysaccharide vaccine = PPSV23  (Pneumovax) Adults 19-65 yo with certain risk factors or if 65+ yo  If never received any pneumonia vaccine: recommend Prevnar 20 (PCV20)  Give PCV20 if previously received 1 dose of PCV13 or PPSV23   Hepatitis B Vaccine 3 dose series if at intermediate or high risk (ex: diabetes, end stage renal disease, liver disease)   Respiratory syncytial virus (RSV) Vaccine - COVERED BY MEDICARE PART D  * RSVPreF3 (Arexvy) CDC recommends that adults 60 years of age and older may receive a single dose of RSV vaccine using shared clinical decision-making (SCDM)   Tetanus (Td) Vaccine - COST NOT COVERED BY MEDICARE PART B Following completion of primary series, a booster dose should be given every 10 years to maintain immunity against tetanus. Td may also be given as tetanus wound prophylaxis.   Tdap Vaccine - COST NOT COVERED BY MEDICARE PART B Recommended at least once for all adults. For pregnant patients, recommended with each pregnancy.   Shingles Vaccine (Shingrix) - COST NOT COVERED BY MEDICARE PART B  2 shot series recommended in those 19 years and older who have or will have weakened immune systems or those 50 years and older     Health Maintenance Due:      Topic Date Due   • Hepatitis C Screening  Completed   • Colorectal Cancer Screening  Discontinued     Immunizations Due:  There are no preventive care reminders to display for this patient.  Advance Directives   What are advance directives?  Advance directives are legal documents that state your wishes and plans for medical care. These plans are made ahead of time in case you lose your ability to make decisions for yourself. Advance directives can apply to any medical decision, such as the treatments you want, and if you want to donate organs.   What are the types of advance directives?  There are many types of advance directives, and each state has rules about how to use them. You may choose a combination of any of the following:  Living will:  This is a written  record of the treatment you want. You can also choose which treatments you do not want, which to limit, and which to stop at a certain time. This includes surgery, medicine, IV fluid, and tube feedings.   Durable power of  for healthcare (DPAHC):  This is a written record that states who you want to make healthcare choices for you when you are unable to make them for yourself. This person, called a proxy, is usually a family member or a friend. You may choose more than 1 proxy.  Do not resuscitate (DNR) order:  A DNR order is used in case your heart stops beating or you stop breathing. It is a request not to have certain forms of treatment, such as CPR. A DNR order may be included in other types of advance directives.  Medical directive:  This covers the care that you want if you are in a coma, near death, or unable to make decisions for yourself. You can list the treatments you want for each condition. Treatment may include pain medicine, surgery, blood transfusions, dialysis, IV or tube feedings, and a ventilator (breathing machine).  Values history:  This document has questions about your views, beliefs, and how you feel and think about life. This information can help others choose the care that you would choose.  Why are advance directives important?  An advance directive helps you control your care. Although spoken wishes may be used, it is better to have your wishes written down. Spoken wishes can be misunderstood, or not followed. Treatments may be given even if you do not want them. An advance directive may make it easier for your family to make difficult choices about your care.   Weight Management   Why it is important to manage your weight:  Being overweight increases your risk of health conditions such as heart disease, high blood pressure, type 2 diabetes, and certain types of cancer. It can also increase your risk for osteoarthritis, sleep apnea, and other respiratory problems. Aim for a slow,  steady weight loss. Even a small amount of weight loss can lower your risk of health problems.  How to lose weight safely:  A safe and healthy way to lose weight is to eat fewer calories and get regular exercise. You can lose up about 1 pound a week by decreasing the number of calories you eat by 500 calories each day.   Healthy meal plan for weight management:  A healthy meal plan includes a variety of foods, contains fewer calories, and helps you stay healthy. A healthy meal plan includes the following:  Eat whole-grain foods more often.  A healthy meal plan should contain fiber. Fiber is the part of grains, fruits, and vegetables that is not broken down by your body. Whole-grain foods are healthy and provide extra fiber in your diet. Some examples of whole-grain foods are whole-wheat breads and pastas, oatmeal, brown rice, and bulgur.  Eat a variety of vegetables every day.  Include dark, leafy greens such as spinach, kale, pj greens, and mustard greens. Eat yellow and orange vegetables such as carrots, sweet potatoes, and winter squash.   Eat a variety of fruits every day.  Choose fresh or canned fruit (canned in its own juice or light syrup) instead of juice. Fruit juice has very little or no fiber.  Eat low-fat dairy foods.  Drink fat-free (skim) milk or 1% milk. Eat fat-free yogurt and low-fat cottage cheese. Try low-fat cheeses such as mozzarella and other reduced-fat cheeses.  Choose meat and other protein foods that are low in fat.  Choose beans or other legumes such as split peas or lentils. Choose fish, skinless poultry (chicken or turkey), or lean cuts of red meat (beef or pork). Before you cook meat or poultry, cut off any visible fat.   Use less fat and oil.  Try baking foods instead of frying them. Add less fat, such as margarine, sour cream, regular salad dressing and mayonnaise to foods. Eat fewer high-fat foods. Some examples of high-fat foods include french fries, doughnuts, ice cream, and  cakes.  Eat fewer sweets.  Limit foods and drinks that are high in sugar. This includes candy, cookies, regular soda, and sweetened drinks.  Exercise:  Exercise at least 30 minutes per day on most days of the week. Some examples of exercise include walking, biking, dancing, and swimming. You can also fit in more physical activity by taking the stairs instead of the elevator or parking farther away from stores. Ask your healthcare provider about the best exercise plan for you.      © Copyright [employer] 2018 Information is for End User's use only and may not be sold, redistributed or otherwise used for commercial purposes. All illustrations and images included in CareNotes® are the copyrighted property of [employer].A[employer]M., Inc[employer] or [employer]

## 2025-03-10 NOTE — ASSESSMENT & PLAN NOTE
Stable.  Orders:    CBC; Future    Comprehensive metabolic panel; Future    Lipid Panel with Direct LDL reflex; Future

## 2025-03-10 NOTE — ASSESSMENT & PLAN NOTE
Wt Readings from Last 3 Encounters:   03/10/25 97.6 kg (215 lb 3.2 oz)   01/17/25 98 kg (216 lb)   01/08/25 98 kg (216 lb)     Elevated blood pressure today.  Has been somewhat consistently high.  Increase losartan to 50 mg daily.  Will continue to monitor.  Advised blood pressure monitoring at home.          Orders:    losartan (COZAAR) 50 mg tablet; Take 1 tablet (50 mg total) by mouth daily

## 2025-03-10 NOTE — ASSESSMENT & PLAN NOTE
Increase Cozaar to 50 mg daily.    Monitor blood pressure at home.  Continue with salt restriction.      Orders:    losartan (COZAAR) 50 mg tablet; Take 1 tablet (50 mg total) by mouth daily

## 2025-03-26 DIAGNOSIS — Z95.2 S/P TAVR (TRANSCATHETER AORTIC VALVE REPLACEMENT): ICD-10-CM

## 2025-03-26 RX ORDER — METOPROLOL TARTRATE 25 MG/1
12.5 TABLET, FILM COATED ORAL EVERY 12 HOURS
Qty: 90 TABLET | Refills: 1 | Status: SHIPPED | OUTPATIENT
Start: 2025-03-26

## 2025-04-03 ENCOUNTER — OFFICE VISIT (OUTPATIENT)
Dept: CARDIOLOGY CLINIC | Facility: CLINIC | Age: 80
End: 2025-04-03
Payer: MEDICARE

## 2025-04-03 VITALS
OXYGEN SATURATION: 98 % | WEIGHT: 211 LBS | HEIGHT: 67 IN | SYSTOLIC BLOOD PRESSURE: 128 MMHG | HEART RATE: 68 BPM | BODY MASS INDEX: 33.12 KG/M2 | DIASTOLIC BLOOD PRESSURE: 74 MMHG

## 2025-04-03 DIAGNOSIS — R07.89 OTHER CHEST PAIN: Primary | ICD-10-CM

## 2025-04-03 PROCEDURE — 99214 OFFICE O/P EST MOD 30 MIN: CPT

## 2025-04-03 RX ORDER — AMOXICILLIN 500 MG/1
TABLET, FILM COATED ORAL
COMMUNITY
Start: 2025-02-10

## 2025-04-03 NOTE — PROGRESS NOTES
Cardiology Follow Up    Akhil Gómez  1945  [id number]  [facility] [place] [address] [address]  [address] [id number] [phone]    1. Other chest pain  NM myocardial perfusion spect (stress and/or rest)      Discussion/Summary:  1.  Chest pain  Patient with complaints of intermittent left-sided chest pain lasting 30 minutes at a time on 2/24/2025 prompting him to seek evaluation in the emergency department.  Patient experienced no associated symptoms, resolved without intervention prior to coming to the emergency room.  EKG at that visit did not demonstrate any evidence of ischemia, troponin negative x 2.  Patient hypertensive in the ER on arrival with systolic in the 190s.     Patient exercises at the gym 5 days a week, 6 miles on the bike and has not experienced chest discomfort with this activity.  He does endorse that he finds that he feels more tired with exercise in the last year.  Echocardiogram in January demonstrated segmental hypokinesis.   Nuclear medicine stress test ordered     2.  Aortic stenosis /TAVR -2022  TTE 1/17/2025: No evidence of paravalvular regurgitation, trace transvalvular regurgitation -otherwise normally functioning bioprosthetic valve    3.  Hypertension  Cozaar increased at his last visit with his PCP on 3/10 -patient brings in a blood pressure log with all pressures at goal of 130/80 or less  Continue Cozaar 50 mg daily, metoprolol tartrate 12.5 mg twice daily    4.  Hyperlipidemia  Lipid profile in January within normal limits -  Continue simvastatin 10 mg daily as ordered    5.  CAD  Preoperative TAVR cardiac cath in 2022 showed mild nonobstructive coronary artery disease.  Patient denies chest pain, chest pressure at this time.  Does endorse fatigue with exercise  Continue on aspirin, metoprolol, losartan as ordered  Nuclear stress as above    Interval History:   Akhil Gómez is  a 79 y.o. male with a past medical history of aortic stenosis treated with TAVR in 2022, hyperlipidemia, hypertension, mild CAD who presents for routine follow-up.    Patient in the emergency room on 2/24/2025 with complaints of left-sided chest discomfort.  EKG and troponins normal at that visit.  Patient has had no further chest pain since that visit.  He does endorse that he is exercising 5 days a week however in the last year or so he seems to feel more sluggish earlier on in his exercise routine.    Patient currently denies chest pain, chest pressure, lightheadedness, dizziness, syncopal or presyncopal episodes.    Medical Problems       Problem List       Cataract    Hyperlipidemia    Umbilical hernia without obstruction and without gangrene    Prediabetes    Rectus diastasis    Obesity (BMI 30.0-34.9)    S/P TAVR (transcatheter aortic valve replacement)    Overview Signed 3/10/2025  7:54 AM by Chet Delarosa MD   Tavr in 2022       CAD (coronary artery disease)    CHF (congestive heart failure) ([facility])    Overview Signed 3/10/2025  7:53 AM by Chet Delarosa MD   Ef 55%, mild diastolic dysfunction       Benign essential HTN    Hypertensive heart disease with congestive heart failure ([facility])    Overview Signed 3/3/2025 11:54 AM by Nati Gutierrez   Added per ICD-10 guidelines        Wt Readings from Last 3 Encounters:   04/03/25 95.7 kg (211 lb)   03/10/25 97.6 kg (215 lb 3.2 oz)   01/17/25 98 kg (216 lb)           Past Medical History:   Diagnosis Date    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, 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    CAD (coronary artery disease)     Cataract     CHF (congestive heart failure) ([facility])     Encounter for pre-operative laboratory testing 2/15/2022    Glaucoma     Hyperchloremia 2/22/2022    Hyperlipidemia     Moderate aortic insufficiency     Prediabetes     Severe aortic stenosis     Sinus bradycardia     Umbilical hernia      Social History     Socioeconomic History    Marital status:  /Civil Union     Spouse name: Not on file    Number of children: Not on file    Years of education: Not on file    Highest education level: Not on file   Occupational History    Not on file   Tobacco Use    Smoking status: Never    Smokeless tobacco: Never   Vaping Use    Vaping status: Never Used   Substance and Sexual Activity    Alcohol use: Yes     Alcohol/week: 2.0 standard drinks of alcohol     Types: 2 Cans of beer per week     Comment: twice a wk     Drug use: No    Sexual activity: Not on file   Other Topics Concern    Not on file   Social History Narrative    Not on file     Social Drivers of Health     Financial Resource Strain: Low Risk  (3/6/2024)    Overall Financial Resource Strain (CARDIA)     Difficulty of Paying Living Expenses: Not hard at all   Food Insecurity: No Food Insecurity (3/10/2025)    Hunger Vital Sign     Worried About Running Out of Food in the Last Year: Never true     Ran Out of Food in the Last Year: Never true   Transportation Needs: No Transportation Needs (3/10/2025)    PRAPARE - Transportation     Lack of Transportation (Medical): No     Lack of Transportation (Non-Medical): No   Physical Activity: Not on file   Stress: Not on file   Social Connections: Not on file   Intimate Partner Violence: Not on file   Housing Stability: Low Risk  (3/10/2025)    Housing Stability Vital Sign     Unable to Pay for Housing in the Last Year: No     Number of Times Moved in the Last Year: 0     Homeless in the Last Year: No      Family History   Problem Relation Age of Onset    Colon cancer Mother     Other Father         heart problem    Parkinsonism Family      Past Surgical History:   Procedure Laterality Date    ARTHROSCOPY KNEE Right     BUNIONECTOMY Right     CARDIAC CATHETERIZATION Bilateral 2/14/2022    Procedure: Cardiac catheterization;  Surgeon: Mohan Perez MD;  Location: [facility];  Service: Cardiology    CARDIAC CATHETERIZATION N/A 2/22/2022    Procedure:  CARDIAC TAVR;  Surgeon: Randall Webb MD;  Location: [facility];  Service: Cardiology    HEMORRHOID SURGERY      SD ECHO TRANSESOPHAG R-T 2D W/PRB IMG ACQUISJ I&R N/A 2/22/2022    Procedure: TRANSESOPHAGEAL ECHOCARDIOGRAM (ELISSA);  Surgeon: CHRISTOPHER Fabian MD;  Location: [facility];  Service: Cardiac Surgery    SD REPLACE AORTIC VALVE OPENFEMORAL ARTERY APPROACH N/A 2/22/2022    Procedure: REPLACEMENT AORTIC VALVE TRANSCATHETER (TAVR) TRANSFEMORAL W/ 29MM SARKAR PAULO S3 ULTRA VALVE(ACCESS ON LEFT);  Surgeon: CHRISTOPHER Fabian MD;  Location: [facility];  Service: Cardiac Surgery    SHOULDER SURGERY  2012    TONSILLECTOMY      childhood       Current Outpatient Medications:     amoxicillin (AMOXIL) 500 MG tablet, 4 TABLET BY MOUTH AS DIRECTED AS DIRECTED TAKE 4 TABLETS 1 HR PRIOR TO DENTAL PROCEDURE, Disp: , Rfl:     aspirin 81 mg chewable tablet, Chew 1 tablet (81 mg total) daily, Disp: 30 tablet, Rfl: 2    dorzolamide-timolol (COSOPT) 22.3-6.8 MG/ML ophthalmic solution, INSTILL 1 DROP INTO BOTH EYES TWICE A DAY, Disp: , Rfl:     latanoprost (XALATAN) 0.005 % ophthalmic solution, INSTILL 1 DROP INTO EACH EYE ONCE DAILY AT NIGHT, Disp: , Rfl:     losartan (COZAAR) 50 mg tablet, Take 1 tablet (50 mg total) by mouth daily, Disp: 100 tablet, Rfl: 1    metoprolol tartrate (LOPRESSOR) 25 mg tablet, Take 0.5 tablets (12.5 mg total) by mouth every 12 (twelve) hours, Disp: 90 tablet, Rfl: 1    simvastatin (ZOCOR) 10 mg tablet, TAKE 1 TABLET BY MOUTH EVERY DAY, Disp: 90 tablet, Rfl: 1  No Known Allergies    Labs:     Chemistry        Component Value Date/Time     11/20/2014 0905    K 4.2 02/24/2025 1714    K 4.4 11/20/2014 0905     (H) 02/24/2025 1714     11/20/2014 0905    CO2 21 02/24/2025 1714    CO2 24 02/22/2022 1039    BUN 23 02/24/2025 1714    BUN 17 11/20/2014 0905    CREATININE 1.06 02/24/2025 1714    CREATININE 0.77 11/20/2014 0905        Component Value Date/Time    CALCIUM 8.6  "02/24/2025 1714    CALCIUM 8.8 11/20/2014 0905    ALKPHOS 47 02/24/2025 1714    ALKPHOS 71 11/20/2014 0905    AST 17 02/24/2025 1714    AST 27 11/20/2014 0905    ALT 19 02/24/2025 1714    ALT 46 11/20/2014 0905    BILITOT 0.5 11/20/2014 0905            Lab Results   Component Value Date    CHOL 246 11/20/2014     Lab Results   Component Value Date    HDL 56 01/14/2025    HDL 51 03/08/2024    HDL 54 08/21/2023     Lab Results   Component Value Date    LDLCALC 98 01/14/2025    LDLCALC 84 03/08/2024    LDLCALC 83 08/21/2023     Lab Results   Component Value Date    TRIG 109 01/14/2025    TRIG 75 03/08/2024    TRIG 105 08/21/2023       Review of Systems   Constitutional: Negative for malaise/fatigue, weight gain and weight loss.   Cardiovascular:  Positive for chest pain and dyspnea on exertion. Negative for irregular heartbeat, leg swelling, near-syncope, orthopnea, palpitations, paroxysmal nocturnal dyspnea and syncope.   Respiratory:  Positive for wheezing. Negative for cough and shortness of breath.    Genitourinary:  Negative for hematuria.   Neurological:  Negative for dizziness, headaches, light-headedness, loss of balance and weakness.       Vitals:    04/03/25 0753   BP: 128/74   Pulse: 68   SpO2: 98%     Vitals:    04/03/25 0753   Weight: 95.7 kg (211 lb)     Height: 5' 6.93\" (170 cm)   Body mass index is 33.12 kg/m².    Physical Exam  Constitutional:       Appearance: Normal appearance. He is obese.   HENT:      Head: Normocephalic and atraumatic.      Mouth/Throat:      Mouth: Mucous membranes are moist.   Neck:      Vascular: No carotid bruit.   Cardiovascular:      Rate and Rhythm: Normal rate and regular rhythm.      Pulses: Normal pulses.      Heart sounds: Normal heart sounds.   Pulmonary:      Effort: Pulmonary effort is normal.      Breath sounds: Normal breath sounds.   Abdominal:      General: Abdomen is protuberant.      Palpations: Abdomen is soft.   Musculoskeletal:         General: Normal range " of motion.      Cervical back: Normal range of motion.      Right lower leg: No edema.      Left lower leg: No edema.   Skin:     General: Skin is warm and dry.      Capillary Refill: Capillary refill takes less than 2 seconds.   Neurological:      General: No focal deficit present.      Mental Status: He is alert and oriented to person, place, and time. Mental status is at baseline.   Psychiatric:         Mood and Affect: Mood normal.         Behavior: Behavior normal.         Thought Content: Thought content normal.         Judgment: Judgment normal.       Ne negative x 2gative x 2

## 2025-04-28 ENCOUNTER — HOSPITAL ENCOUNTER (OUTPATIENT)
Dept: NUCLEAR MEDICINE | Facility: HOSPITAL | Age: 80
Discharge: HOME | End: 2025-04-28
Payer: MEDICARE

## 2025-04-28 ENCOUNTER — HOSPITAL ENCOUNTER (OUTPATIENT)
Dept: NON INVASIVE DIAGNOSTICS | Facility: HOSPITAL | Age: 80
Discharge: HOME | End: 2025-04-28
Payer: MEDICARE

## 2025-04-28 DIAGNOSIS — R07.89 OTHER CHEST PAIN: ICD-10-CM

## 2025-04-28 LAB
MAX HR PERCENT: 91 %
MAX HR: 130 BPM
RATE PRESSURE PRODUCT: NORMAL
SL CV REST NUCLEAR ISOTOPE DOSE: 10 MCI
SL CV STRESS NUCLEAR ISOTOPE DOSE: 32.9 MCI
SL CV STRESS STAGE REACHED: 3
SPECT HRT GATED+EF W RNC IV: 51 %
STRESS ANGINA INDEX: 0
STRESS BASELINE HR: 54 BPM
STRESS PEAK HR: 130 BPM
STRESS POST ESTIMATED WORKLOAD: 7.7 METS
STRESS POST EXERCISE DUR MIN: 6 MIN
STRESS POST EXERCISE DUR SEC: 18 SEC
STRESS POST PEAK BP: 190 MMHG

## 2025-04-28 PROCEDURE — 93017 CV STRESS TEST TRACING ONLY: CPT

## 2025-04-28 PROCEDURE — 78452 HT MUSCLE IMAGE SPECT MULT: CPT

## 2025-04-28 PROCEDURE — 93018 CV STRESS TEST I&R ONLY: CPT | Performed by: INTERNAL MEDICINE

## 2025-04-28 PROCEDURE — 78452 HT MUSCLE IMAGE SPECT MULT: CPT | Performed by: INTERNAL MEDICINE

## 2025-04-28 PROCEDURE — 93016 CV STRESS TEST SUPVJ ONLY: CPT | Performed by: INTERNAL MEDICINE

## 2025-04-28 PROCEDURE — A9502 TC99M TETROFOSMIN: HCPCS

## 2025-04-30 LAB
CHEST PAIN STATEMENT: NORMAL
MAX DIASTOLIC BP: 70 MMHG
MAX PREDICTED HEART RATE: 142 BPM
PROTOCOL NAME: NORMAL
REASON FOR TERMINATION: NORMAL
STRESS POST EXERCISE DUR MIN: 6 MIN
STRESS POST EXERCISE DUR SEC: 18 SEC
STRESS POST PEAK HR: 130 BPM
STRESS POST PEAK SYSTOLIC BP: 190 MMHG
TARGET HR FORMULA: NORMAL
TEST INDICATION: NORMAL

## 2025-05-06 ENCOUNTER — TELEPHONE (OUTPATIENT)
Dept: CARDIOLOGY CLINIC | Facility: CLINIC | Age: 80
End: 2025-05-06

## 2025-05-07 ENCOUNTER — RESULTS FOLLOW-UP (OUTPATIENT)
Dept: NON INVASIVE DIAGNOSTICS | Facility: HOSPITAL | Age: 80
End: 2025-05-07

## 2025-05-07 NOTE — TELEPHONE ENCOUNTER
Called left detailed vm for pt, message was relayed as given. Pt to cb with any questions he may have.

## 2025-07-16 DIAGNOSIS — E78.5 HYPERLIPIDEMIA, UNSPECIFIED HYPERLIPIDEMIA TYPE: ICD-10-CM

## 2025-07-17 RX ORDER — SIMVASTATIN 10 MG
10 TABLET ORAL DAILY
Qty: 90 TABLET | Refills: 1 | Status: SHIPPED | OUTPATIENT
Start: 2025-07-17

## (undated) DEVICE — DGW .035 FC J3MM 260CM TEF: Brand: EMERALD

## (undated) DEVICE — PAD GROUNDING ADULT

## (undated) DEVICE — SWAN-GANZ BIPOLAR PACING CATHETER: Brand: SWAN-GANZ

## (undated) DEVICE — THERMOFLECT BLANKET, L, 25EA                               TS THERMOFLECT BLANKET, 48" X 84", SILVER, 5/BG, 5 BG/CS NW: Brand: THERMOFLECT

## (undated) DEVICE — INTENDED FOR TISSUE SEPARATION, AND OTHER PROCEDURES THAT REQUIRE A SHARP SURGICAL BLADE TO PUNCTURE OR CUT.: Brand: BARD-PARKER ® CARBON RIB-BACK BLADES

## (undated) DEVICE — BETHLEHEM MAJOR GENERAL PACK: Brand: CARDINAL HEALTH

## (undated) DEVICE — GLOVE INDICATOR PI UNDERGLOVE SZ 8 BLUE

## (undated) DEVICE — HEAVY DUTY TABLE COVER: Brand: CONVERTORS

## (undated) DEVICE — GUIDEWIRE LUNDERQUIST TSCMG-35-300-LESDC

## (undated) DEVICE — DGW .035 FC STR 260CM TEF: Brand: EMERALD

## (undated) DEVICE — 3M™ TEGADERM™ TRANSPARENT FILM DRESSING FRAME STYLE, 1626W, 4 IN X 4-3/4 IN (10 CM X 12 CM), 50/CT 4CT/CASE: Brand: 3M™ TEGADERM™

## (undated) DEVICE — TRAY FOLEY 16FR SURESTEP TEMP SENS URIMETER STAT LOK

## (undated) DEVICE — GLOVE SRG BIOGEL ECLIPSE 8

## (undated) DEVICE — PINNACLE INTRODUCER SHEATH: Brand: PINNACLE

## (undated) DEVICE — TR BAND RADIAL ARTERY COMPRESSION DEVICE: Brand: TR BAND

## (undated) DEVICE — CATH DIAG 6FR IMPULSE 100CM FR4

## (undated) DEVICE — ADHESIVE SKIN HIGH VISCOSITY EXOFIN 1ML

## (undated) DEVICE — X-RAY DETECTABLE SPONGES,16 PLY: Brand: VISTEC

## (undated) DEVICE — GAUZE SPONGES,USP TYPE VII GAUZE, 12 PLY: Brand: CURITY

## (undated) DEVICE — CARDIOVASCULAR SPLIT DRAPE: Brand: CONVERTORS

## (undated) DEVICE — DEFIB ADULT ELECTRODE CARDINAL

## (undated) DEVICE — AMPLATZ EXTRA STIFF WIRE GUIDE: Brand: AMPLATZ

## (undated) DEVICE — SUT SILK 0 CT-1 30 IN 424H

## (undated) DEVICE — DGW .035 FC J3MM 150CM TEF: Brand: EMERALD

## (undated) DEVICE — 3000CC GUARDIAN II: Brand: GUARDIAN

## (undated) DEVICE — CATH DIAG 6FR IMPULSE 110CM PIG 155 ANG

## (undated) DEVICE — Device

## (undated) DEVICE — CARDIO PERI-GROIN: Brand: CONVERTORS

## (undated) DEVICE — RADIFOCUS OPTITORQUE ANGIOGRAPHIC CATHETER: Brand: OPTITORQUE

## (undated) DEVICE — GLIDESHEATH SLENDER STAINLESS STEEL KIT: Brand: GLIDESHEATH SLENDER

## (undated) DEVICE — GUIDEWIRE WHOLEY HI TORQUE INTERM MOD J .035 145CM